# Patient Record
Sex: MALE | Race: OTHER | HISPANIC OR LATINO | Employment: FULL TIME | URBAN - NONMETROPOLITAN AREA
[De-identification: names, ages, dates, MRNs, and addresses within clinical notes are randomized per-mention and may not be internally consistent; named-entity substitution may affect disease eponyms.]

---

## 2019-06-09 ENCOUNTER — APPOINTMENT (EMERGENCY)
Dept: RADIOLOGY | Facility: HOSPITAL | Age: 49
End: 2019-06-09
Payer: COMMERCIAL

## 2019-06-09 ENCOUNTER — HOSPITAL ENCOUNTER (EMERGENCY)
Facility: HOSPITAL | Age: 49
End: 2019-06-09
Attending: EMERGENCY MEDICINE | Admitting: EMERGENCY MEDICINE
Payer: COMMERCIAL

## 2019-06-09 VITALS
HEART RATE: 61 BPM | DIASTOLIC BLOOD PRESSURE: 75 MMHG | WEIGHT: 236.55 LBS | HEIGHT: 73 IN | OXYGEN SATURATION: 95 % | BODY MASS INDEX: 31.35 KG/M2 | RESPIRATION RATE: 15 BRPM | SYSTOLIC BLOOD PRESSURE: 130 MMHG

## 2019-06-09 DIAGNOSIS — I21.4 NSTEMI (NON-ST ELEVATED MYOCARDIAL INFARCTION) (HCC): Primary | ICD-10-CM

## 2019-06-09 LAB
ALBUMIN SERPL BCP-MCNC: 3.8 G/DL (ref 3.5–5)
ALP SERPL-CCNC: 136 U/L (ref 46–116)
ALT SERPL W P-5'-P-CCNC: 48 U/L (ref 12–78)
ANION GAP SERPL CALCULATED.3IONS-SCNC: 13 MMOL/L (ref 4–13)
APTT PPP: 30 SECONDS (ref 26–38)
AST SERPL W P-5'-P-CCNC: 31 U/L (ref 5–45)
BASOPHILS # BLD AUTO: 0.04 THOUSANDS/ΜL (ref 0–0.1)
BASOPHILS NFR BLD AUTO: 0 % (ref 0–1)
BILIRUB SERPL-MCNC: 0.5 MG/DL (ref 0.2–1)
BUN SERPL-MCNC: 12 MG/DL (ref 5–25)
CALCIUM SERPL-MCNC: 8.6 MG/DL (ref 8.3–10.1)
CHLORIDE SERPL-SCNC: 106 MMOL/L (ref 100–108)
CO2 SERPL-SCNC: 21 MMOL/L (ref 21–32)
CREAT SERPL-MCNC: 1.12 MG/DL (ref 0.6–1.3)
EOSINOPHIL # BLD AUTO: 0.09 THOUSAND/ΜL (ref 0–0.61)
EOSINOPHIL NFR BLD AUTO: 1 % (ref 0–6)
ERYTHROCYTE [DISTWIDTH] IN BLOOD BY AUTOMATED COUNT: 13.6 % (ref 11.6–15.1)
GFR SERPL CREATININE-BSD FRML MDRD: 77 ML/MIN/1.73SQ M
GLUCOSE SERPL-MCNC: 148 MG/DL (ref 65–140)
HCT VFR BLD AUTO: 49.1 % (ref 36.5–49.3)
HGB BLD-MCNC: 16.3 G/DL (ref 12–17)
HOLD SPECIMEN: NORMAL
IMM GRANULOCYTES # BLD AUTO: 0.07 THOUSAND/UL (ref 0–0.2)
IMM GRANULOCYTES NFR BLD AUTO: 1 % (ref 0–2)
INR PPP: 1.03 (ref 0.86–1.17)
LYMPHOCYTES # BLD AUTO: 4.24 THOUSANDS/ΜL (ref 0.6–4.47)
LYMPHOCYTES NFR BLD AUTO: 33 % (ref 14–44)
MCH RBC QN AUTO: 31.8 PG (ref 26.8–34.3)
MCHC RBC AUTO-ENTMCNC: 33.2 G/DL (ref 31.4–37.4)
MCV RBC AUTO: 96 FL (ref 82–98)
MONOCYTES # BLD AUTO: 0.78 THOUSAND/ΜL (ref 0.17–1.22)
MONOCYTES NFR BLD AUTO: 6 % (ref 4–12)
NEUTROPHILS # BLD AUTO: 7.81 THOUSANDS/ΜL (ref 1.85–7.62)
NEUTS SEG NFR BLD AUTO: 59 % (ref 43–75)
NRBC BLD AUTO-RTO: 0 /100 WBCS
PLATELET # BLD AUTO: 241 THOUSANDS/UL (ref 149–390)
PMV BLD AUTO: 10.1 FL (ref 8.9–12.7)
POTASSIUM SERPL-SCNC: 4 MMOL/L (ref 3.5–5.3)
PROT SERPL-MCNC: 7.5 G/DL (ref 6.4–8.2)
PROTHROMBIN TIME: 13 SECONDS (ref 11.8–14.2)
RBC # BLD AUTO: 5.13 MILLION/UL (ref 3.88–5.62)
SODIUM SERPL-SCNC: 140 MMOL/L (ref 136–145)
TROPONIN I SERPL-MCNC: 0.14 NG/ML
WBC # BLD AUTO: 13.03 THOUSAND/UL (ref 4.31–10.16)

## 2019-06-09 PROCEDURE — 80053 COMPREHEN METABOLIC PANEL: CPT | Performed by: EMERGENCY MEDICINE

## 2019-06-09 PROCEDURE — 85025 COMPLETE CBC W/AUTO DIFF WBC: CPT | Performed by: EMERGENCY MEDICINE

## 2019-06-09 PROCEDURE — 85730 THROMBOPLASTIN TIME PARTIAL: CPT | Performed by: EMERGENCY MEDICINE

## 2019-06-09 PROCEDURE — 93005 ELECTROCARDIOGRAM TRACING: CPT

## 2019-06-09 PROCEDURE — 85610 PROTHROMBIN TIME: CPT | Performed by: EMERGENCY MEDICINE

## 2019-06-09 PROCEDURE — 96365 THER/PROPH/DIAG IV INF INIT: CPT

## 2019-06-09 PROCEDURE — 71045 X-RAY EXAM CHEST 1 VIEW: CPT

## 2019-06-09 PROCEDURE — 84484 ASSAY OF TROPONIN QUANT: CPT | Performed by: EMERGENCY MEDICINE

## 2019-06-09 PROCEDURE — 96366 THER/PROPH/DIAG IV INF ADDON: CPT

## 2019-06-09 PROCEDURE — 96375 TX/PRO/DX INJ NEW DRUG ADDON: CPT

## 2019-06-09 PROCEDURE — 99285 EMERGENCY DEPT VISIT HI MDM: CPT

## 2019-06-09 PROCEDURE — 99285 EMERGENCY DEPT VISIT HI MDM: CPT | Performed by: EMERGENCY MEDICINE

## 2019-06-09 PROCEDURE — 36415 COLL VENOUS BLD VENIPUNCTURE: CPT | Performed by: EMERGENCY MEDICINE

## 2019-06-09 RX ORDER — ASPIRIN 81 MG/1
243 TABLET, CHEWABLE ORAL ONCE
Status: COMPLETED | OUTPATIENT
Start: 2019-06-09 | End: 2019-06-09

## 2019-06-09 RX ORDER — HEPARIN SODIUM 1000 [USP'U]/ML
4000 INJECTION, SOLUTION INTRAVENOUS; SUBCUTANEOUS ONCE
Status: COMPLETED | OUTPATIENT
Start: 2019-06-09 | End: 2019-06-09

## 2019-06-09 RX ORDER — ASPIRIN 81 MG/1
81 TABLET, CHEWABLE ORAL ONCE
Status: COMPLETED | OUTPATIENT
Start: 2019-06-09 | End: 2019-06-09

## 2019-06-09 RX ORDER — HEPARIN SODIUM 1000 [USP'U]/ML
4000 INJECTION, SOLUTION INTRAVENOUS; SUBCUTANEOUS AS NEEDED
Status: DISCONTINUED | OUTPATIENT
Start: 2019-06-09 | End: 2019-06-10 | Stop reason: HOSPADM

## 2019-06-09 RX ORDER — HEPARIN SODIUM 10000 [USP'U]/100ML
3-20 INJECTION, SOLUTION INTRAVENOUS
Status: DISCONTINUED | OUTPATIENT
Start: 2019-06-09 | End: 2019-06-10 | Stop reason: HOSPADM

## 2019-06-09 RX ORDER — HEPARIN SODIUM 1000 [USP'U]/ML
2000 INJECTION, SOLUTION INTRAVENOUS; SUBCUTANEOUS AS NEEDED
Status: DISCONTINUED | OUTPATIENT
Start: 2019-06-09 | End: 2019-06-10 | Stop reason: HOSPADM

## 2019-06-09 RX ADMIN — HEPARIN SODIUM 4000 UNITS: 1000 INJECTION, SOLUTION INTRAVENOUS; SUBCUTANEOUS at 21:56

## 2019-06-09 RX ADMIN — ASPIRIN 81 MG 243 MG: 81 TABLET ORAL at 23:02

## 2019-06-09 RX ADMIN — ASPIRIN 81 MG 81 MG: 81 TABLET ORAL at 21:48

## 2019-06-09 RX ADMIN — HEPARIN SODIUM AND DEXTROSE 11.1 UNITS/KG/HR: 10000; 5 INJECTION INTRAVENOUS at 21:57

## 2019-06-10 ENCOUNTER — APPOINTMENT (INPATIENT)
Dept: NON INVASIVE DIAGNOSTICS | Facility: HOSPITAL | Age: 49
DRG: 247 | End: 2019-06-10
Payer: COMMERCIAL

## 2019-06-10 ENCOUNTER — HOSPITAL ENCOUNTER (INPATIENT)
Facility: HOSPITAL | Age: 49
LOS: 1 days | Discharge: HOME | DRG: 247 | End: 2019-06-11
Attending: HOSPITALIST | Admitting: HOSPITALIST
Payer: COMMERCIAL

## 2019-06-10 DIAGNOSIS — Z72.0 TOBACCO ABUSE: ICD-10-CM

## 2019-06-10 DIAGNOSIS — E78.00 HYPERCHOLESTEROLEMIA: ICD-10-CM

## 2019-06-10 DIAGNOSIS — I21.4 NSTEMI (NON-ST ELEVATED MYOCARDIAL INFARCTION) (HCC): Primary | ICD-10-CM

## 2019-06-10 LAB
ANION GAP SERPL CALCULATED.3IONS-SCNC: 16 MMOL/L (ref 4–13)
APTT PPP: 48 SECONDS (ref 26–38)
APTT PPP: 53 SECONDS (ref 26–38)
ATRIAL RATE: 48 BPM
ATRIAL RATE: 51 BPM
ATRIAL RATE: 63 BPM
BUN SERPL-MCNC: 12 MG/DL (ref 5–25)
CALCIUM SERPL-MCNC: 8.9 MG/DL (ref 8.3–10.1)
CHLORIDE SERPL-SCNC: 110 MMOL/L (ref 100–108)
CHOLEST SERPL-MCNC: 223 MG/DL (ref 50–200)
CO2 SERPL-SCNC: 18 MMOL/L (ref 21–32)
CREAT SERPL-MCNC: 0.98 MG/DL (ref 0.6–1.3)
ERYTHROCYTE [DISTWIDTH] IN BLOOD BY AUTOMATED COUNT: 13.8 % (ref 11.6–15.1)
EST. AVERAGE GLUCOSE BLD GHB EST-MCNC: 120 MG/DL
GFR SERPL CREATININE-BSD FRML MDRD: 90 ML/MIN/1.73SQ M
GLUCOSE SERPL-MCNC: 101 MG/DL (ref 65–140)
HBA1C MFR BLD: 5.8 % (ref 4.2–6.3)
HCT VFR BLD AUTO: 47.7 % (ref 36.5–49.3)
HDLC SERPL-MCNC: 34 MG/DL (ref 40–60)
HGB BLD-MCNC: 15.5 G/DL (ref 12–17)
KCT BLD-ACNC: 272 SEC (ref 89–137)
LDLC SERPL CALC-MCNC: 158 MG/DL (ref 0–100)
MCH RBC QN AUTO: 31.6 PG (ref 26.8–34.3)
MCHC RBC AUTO-ENTMCNC: 32.5 G/DL (ref 31.4–37.4)
MCV RBC AUTO: 97 FL (ref 82–98)
NONHDLC SERPL-MCNC: 189 MG/DL
P AXIS: 41 DEGREES
P AXIS: 49 DEGREES
P AXIS: 66 DEGREES
PLATELET # BLD AUTO: 214 THOUSANDS/UL (ref 149–390)
PMV BLD AUTO: 10.7 FL (ref 8.9–12.7)
POTASSIUM SERPL-SCNC: 4.1 MMOL/L (ref 3.5–5.3)
PR INTERVAL: 142 MS
PR INTERVAL: 146 MS
PR INTERVAL: 148 MS
QRS AXIS: 11 DEGREES
QRS AXIS: 21 DEGREES
QRS AXIS: 65 DEGREES
QRSD INTERVAL: 92 MS
QRSD INTERVAL: 94 MS
QRSD INTERVAL: 98 MS
QT INTERVAL: 442 MS
QT INTERVAL: 480 MS
QT INTERVAL: 490 MS
QTC INTERVAL: 428 MS
QTC INTERVAL: 451 MS
QTC INTERVAL: 452 MS
RBC # BLD AUTO: 4.9 MILLION/UL (ref 3.88–5.62)
SODIUM SERPL-SCNC: 144 MMOL/L (ref 136–145)
SPECIMEN SOURCE: ABNORMAL
T WAVE AXIS: 13 DEGREES
T WAVE AXIS: 16 DEGREES
T WAVE AXIS: 54 DEGREES
TRIGL SERPL-MCNC: 157 MG/DL
TROPONIN I SERPL-MCNC: 5.3 NG/ML
TROPONIN I SERPL-MCNC: 6.95 NG/ML
TROPONIN I SERPL-MCNC: 7.15 NG/ML
VENTRICULAR RATE: 48 BPM
VENTRICULAR RATE: 51 BPM
VENTRICULAR RATE: 63 BPM
WBC # BLD AUTO: 10.19 THOUSAND/UL (ref 4.31–10.16)

## 2019-06-10 PROCEDURE — 85730 THROMBOPLASTIN TIME PARTIAL: CPT | Performed by: HOSPITALIST

## 2019-06-10 PROCEDURE — 80061 LIPID PANEL: CPT | Performed by: PHYSICIAN ASSISTANT

## 2019-06-10 PROCEDURE — C1887 CATHETER, GUIDING: HCPCS | Performed by: PHYSICIAN ASSISTANT

## 2019-06-10 PROCEDURE — C1874 STENT, COATED/COV W/DEL SYS: HCPCS

## 2019-06-10 PROCEDURE — 99153 MOD SED SAME PHYS/QHP EA: CPT | Performed by: PHYSICIAN ASSISTANT

## 2019-06-10 PROCEDURE — B2111ZZ FLUOROSCOPY OF MULTIPLE CORONARY ARTERIES USING LOW OSMOLAR CONTRAST: ICD-10-PCS | Performed by: INTERNAL MEDICINE

## 2019-06-10 PROCEDURE — C1769 GUIDE WIRE: HCPCS | Performed by: PHYSICIAN ASSISTANT

## 2019-06-10 PROCEDURE — 4A023N7 MEASUREMENT OF CARDIAC SAMPLING AND PRESSURE, LEFT HEART, PERCUTANEOUS APPROACH: ICD-10-PCS | Performed by: INTERNAL MEDICINE

## 2019-06-10 PROCEDURE — 85730 THROMBOPLASTIN TIME PARTIAL: CPT | Performed by: PHYSICIAN ASSISTANT

## 2019-06-10 PROCEDURE — 027034Z DILATION OF CORONARY ARTERY, ONE ARTERY WITH DRUG-ELUTING INTRALUMINAL DEVICE, PERCUTANEOUS APPROACH: ICD-10-PCS | Performed by: INTERNAL MEDICINE

## 2019-06-10 PROCEDURE — C1725 CATH, TRANSLUMIN NON-LASER: HCPCS | Performed by: PHYSICIAN ASSISTANT

## 2019-06-10 PROCEDURE — 93010 ELECTROCARDIOGRAM REPORT: CPT | Performed by: INTERNAL MEDICINE

## 2019-06-10 PROCEDURE — 93005 ELECTROCARDIOGRAM TRACING: CPT

## 2019-06-10 PROCEDURE — 93458 L HRT ARTERY/VENTRICLE ANGIO: CPT | Performed by: INTERNAL MEDICINE

## 2019-06-10 PROCEDURE — 99223 1ST HOSP IP/OBS HIGH 75: CPT | Performed by: INTERNAL MEDICINE

## 2019-06-10 PROCEDURE — 84484 ASSAY OF TROPONIN QUANT: CPT | Performed by: PHYSICIAN ASSISTANT

## 2019-06-10 PROCEDURE — 99222 1ST HOSP IP/OBS MODERATE 55: CPT | Performed by: INTERNAL MEDICINE

## 2019-06-10 PROCEDURE — 99152 MOD SED SAME PHYS/QHP 5/>YRS: CPT | Performed by: INTERNAL MEDICINE

## 2019-06-10 PROCEDURE — 85347 COAGULATION TIME ACTIVATED: CPT

## 2019-06-10 PROCEDURE — 85027 COMPLETE CBC AUTOMATED: CPT | Performed by: PHYSICIAN ASSISTANT

## 2019-06-10 PROCEDURE — 83036 HEMOGLOBIN GLYCOSYLATED A1C: CPT | Performed by: PHYSICIAN ASSISTANT

## 2019-06-10 PROCEDURE — 80048 BASIC METABOLIC PNL TOTAL CA: CPT | Performed by: PHYSICIAN ASSISTANT

## 2019-06-10 PROCEDURE — 92928 PRQ TCAT PLMT NTRAC ST 1 LES: CPT | Performed by: INTERNAL MEDICINE

## 2019-06-10 PROCEDURE — 99152 MOD SED SAME PHYS/QHP 5/>YRS: CPT | Performed by: PHYSICIAN ASSISTANT

## 2019-06-10 PROCEDURE — C9600 PERC DRUG-EL COR STENT SING: HCPCS | Performed by: PHYSICIAN ASSISTANT

## 2019-06-10 PROCEDURE — 93458 L HRT ARTERY/VENTRICLE ANGIO: CPT | Performed by: PHYSICIAN ASSISTANT

## 2019-06-10 PROCEDURE — C1894 INTRO/SHEATH, NON-LASER: HCPCS | Performed by: PHYSICIAN ASSISTANT

## 2019-06-10 RX ORDER — METOPROLOL SUCCINATE 25 MG/1
25 TABLET, EXTENDED RELEASE ORAL DAILY
Status: DISCONTINUED | OUTPATIENT
Start: 2019-06-10 | End: 2019-06-11 | Stop reason: HOSPADM

## 2019-06-10 RX ORDER — ASPIRIN 81 MG/1
81 TABLET, CHEWABLE ORAL DAILY
Status: DISCONTINUED | OUTPATIENT
Start: 2019-06-11 | End: 2019-06-11 | Stop reason: HOSPADM

## 2019-06-10 RX ORDER — NITROGLYCERIN 20 MG/100ML
INJECTION INTRAVENOUS CODE/TRAUMA/SEDATION MEDICATION
Status: COMPLETED | OUTPATIENT
Start: 2019-06-10 | End: 2019-06-10

## 2019-06-10 RX ORDER — NITROGLYCERIN 0.4 MG/1
0.4 TABLET SUBLINGUAL
Status: DISCONTINUED | OUTPATIENT
Start: 2019-06-10 | End: 2019-06-11 | Stop reason: HOSPADM

## 2019-06-10 RX ORDER — CALCIUM CARBONATE 200(500)MG
1000 TABLET,CHEWABLE ORAL DAILY PRN
Status: DISCONTINUED | OUTPATIENT
Start: 2019-06-10 | End: 2019-06-11 | Stop reason: HOSPADM

## 2019-06-10 RX ORDER — CLOPIDOGREL BISULFATE 75 MG/1
600 TABLET ORAL ONCE
Status: COMPLETED | OUTPATIENT
Start: 2019-06-10 | End: 2019-06-10

## 2019-06-10 RX ORDER — HEPARIN SODIUM 1000 [USP'U]/ML
INJECTION, SOLUTION INTRAVENOUS; SUBCUTANEOUS CODE/TRAUMA/SEDATION MEDICATION
Status: COMPLETED | OUTPATIENT
Start: 2019-06-10 | End: 2019-06-10

## 2019-06-10 RX ORDER — VERAPAMIL HYDROCHLORIDE 2.5 MG/ML
INJECTION, SOLUTION INTRAVENOUS CODE/TRAUMA/SEDATION MEDICATION
Status: COMPLETED | OUTPATIENT
Start: 2019-06-10 | End: 2019-06-10

## 2019-06-10 RX ORDER — SODIUM CHLORIDE 9 MG/ML
100 INJECTION, SOLUTION INTRAVENOUS CONTINUOUS
Status: DISPENSED | OUTPATIENT
Start: 2019-06-10 | End: 2019-06-10

## 2019-06-10 RX ORDER — ONDANSETRON 2 MG/ML
4 INJECTION INTRAMUSCULAR; INTRAVENOUS EVERY 6 HOURS PRN
Status: DISCONTINUED | OUTPATIENT
Start: 2019-06-10 | End: 2019-06-11 | Stop reason: HOSPADM

## 2019-06-10 RX ORDER — CLOPIDOGREL BISULFATE 75 MG/1
75 TABLET ORAL DAILY
Status: DISCONTINUED | OUTPATIENT
Start: 2019-06-11 | End: 2019-06-11 | Stop reason: HOSPADM

## 2019-06-10 RX ORDER — HEPARIN SODIUM 10000 [USP'U]/100ML
3-20 INJECTION, SOLUTION INTRAVENOUS
Status: DISCONTINUED | OUTPATIENT
Start: 2019-06-10 | End: 2019-06-10

## 2019-06-10 RX ORDER — SODIUM CHLORIDE 9 MG/ML
125 INJECTION, SOLUTION INTRAVENOUS CONTINUOUS
Status: DISCONTINUED | OUTPATIENT
Start: 2019-06-10 | End: 2019-06-10

## 2019-06-10 RX ORDER — ATORVASTATIN CALCIUM 80 MG/1
80 TABLET, FILM COATED ORAL
Status: DISCONTINUED | OUTPATIENT
Start: 2019-06-10 | End: 2019-06-11 | Stop reason: HOSPADM

## 2019-06-10 RX ORDER — ACETAMINOPHEN 325 MG/1
650 TABLET ORAL EVERY 6 HOURS PRN
Status: DISCONTINUED | OUTPATIENT
Start: 2019-06-10 | End: 2019-06-11 | Stop reason: HOSPADM

## 2019-06-10 RX ORDER — NICOTINE 21 MG/24HR
1 PATCH, TRANSDERMAL 24 HOURS TRANSDERMAL
Status: DISCONTINUED | OUTPATIENT
Start: 2019-06-10 | End: 2019-06-11 | Stop reason: HOSPADM

## 2019-06-10 RX ORDER — MIDAZOLAM HYDROCHLORIDE 1 MG/ML
INJECTION INTRAMUSCULAR; INTRAVENOUS CODE/TRAUMA/SEDATION MEDICATION
Status: COMPLETED | OUTPATIENT
Start: 2019-06-10 | End: 2019-06-10

## 2019-06-10 RX ORDER — FENTANYL CITRATE 50 UG/ML
INJECTION, SOLUTION INTRAMUSCULAR; INTRAVENOUS CODE/TRAUMA/SEDATION MEDICATION
Status: COMPLETED | OUTPATIENT
Start: 2019-06-10 | End: 2019-06-10

## 2019-06-10 RX ORDER — LIDOCAINE HYDROCHLORIDE 10 MG/ML
INJECTION, SOLUTION INFILTRATION; PERINEURAL CODE/TRAUMA/SEDATION MEDICATION
Status: COMPLETED | OUTPATIENT
Start: 2019-06-10 | End: 2019-06-10

## 2019-06-10 RX ORDER — ASPIRIN 81 MG/1
TABLET, CHEWABLE ORAL CODE/TRAUMA/SEDATION MEDICATION
Status: COMPLETED | OUTPATIENT
Start: 2019-06-10 | End: 2019-06-10

## 2019-06-10 RX ADMIN — NITROGLYCERIN 200 MCG: 20 INJECTION INTRAVENOUS at 10:21

## 2019-06-10 RX ADMIN — HEPARIN SODIUM 4000 UNITS: 1000 INJECTION INTRAVENOUS; SUBCUTANEOUS at 10:21

## 2019-06-10 RX ADMIN — CLOPIDOGREL BISULFATE 600 MG: 75 TABLET ORAL at 09:36

## 2019-06-10 RX ADMIN — FENTANYL CITRATE 50 MCG: 50 INJECTION, SOLUTION INTRAMUSCULAR; INTRAVENOUS at 10:20

## 2019-06-10 RX ADMIN — SODIUM CHLORIDE 100 ML/HR: 0.9 INJECTION, SOLUTION INTRAVENOUS at 10:56

## 2019-06-10 RX ADMIN — ASPIRIN 81 MG 324 MG: 81 TABLET ORAL at 10:09

## 2019-06-10 RX ADMIN — SODIUM CHLORIDE 125 ML/HR: 0.9 INJECTION, SOLUTION INTRAVENOUS at 03:00

## 2019-06-10 RX ADMIN — NITROGLYCERIN 0.4 MG: 0.4 TABLET SUBLINGUAL at 08:39

## 2019-06-10 RX ADMIN — HEPARIN SODIUM AND DEXTROSE 13.1 UNITS/KG/HR: 10000; 5 INJECTION INTRAVENOUS at 03:15

## 2019-06-10 RX ADMIN — IOHEXOL 75 ML: 350 INJECTION, SOLUTION INTRAVENOUS at 10:33

## 2019-06-10 RX ADMIN — VERAPAMIL HYDROCHLORIDE 2.5 MG: 2.5 INJECTION INTRAVENOUS at 10:21

## 2019-06-10 RX ADMIN — NICOTINE 1 PATCH: 21 PATCH TRANSDERMAL at 21:09

## 2019-06-10 RX ADMIN — ATORVASTATIN CALCIUM 80 MG: 80 TABLET, FILM COATED ORAL at 16:50

## 2019-06-10 RX ADMIN — HEPARIN SODIUM 6000 UNITS: 1000 INJECTION INTRAVENOUS; SUBCUTANEOUS at 10:26

## 2019-06-10 RX ADMIN — MIDAZOLAM 2 MG: 1 INJECTION INTRAMUSCULAR; INTRAVENOUS at 10:20

## 2019-06-10 RX ADMIN — SODIUM CHLORIDE 100 ML/HR: 0.9 INJECTION, SOLUTION INTRAVENOUS at 18:01

## 2019-06-10 RX ADMIN — LIDOCAINE HYDROCHLORIDE 1 ML: 10 INJECTION, SOLUTION INFILTRATION; PERINEURAL at 10:20

## 2019-06-10 RX ADMIN — HEPARIN SODIUM AND DEXTROSE 15.1 UNITS/KG/HR: 10000; 5 INJECTION INTRAVENOUS at 09:31

## 2019-06-11 ENCOUNTER — APPOINTMENT (INPATIENT)
Dept: NON INVASIVE DIAGNOSTICS | Facility: HOSPITAL | Age: 49
DRG: 247 | End: 2019-06-11
Payer: COMMERCIAL

## 2019-06-11 VITALS
SYSTOLIC BLOOD PRESSURE: 127 MMHG | HEIGHT: 73 IN | RESPIRATION RATE: 18 BRPM | BODY MASS INDEX: 29.57 KG/M2 | TEMPERATURE: 97.2 F | HEART RATE: 61 BPM | DIASTOLIC BLOOD PRESSURE: 76 MMHG | OXYGEN SATURATION: 99 % | WEIGHT: 223.11 LBS

## 2019-06-11 PROBLEM — E78.00 HYPERCHOLESTEROLEMIA: Status: ACTIVE | Noted: 2019-06-11

## 2019-06-11 LAB
ATRIAL RATE: 54 BPM
P AXIS: 48 DEGREES
PR INTERVAL: 146 MS
QRS AXIS: 17 DEGREES
QRSD INTERVAL: 90 MS
QT INTERVAL: 472 MS
QTC INTERVAL: 447 MS
T WAVE AXIS: 24 DEGREES
VENTRICULAR RATE: 54 BPM

## 2019-06-11 PROCEDURE — 93005 ELECTROCARDIOGRAM TRACING: CPT

## 2019-06-11 PROCEDURE — 93306 TTE W/DOPPLER COMPLETE: CPT | Performed by: INTERNAL MEDICINE

## 2019-06-11 PROCEDURE — 99232 SBSQ HOSP IP/OBS MODERATE 35: CPT | Performed by: INTERNAL MEDICINE

## 2019-06-11 PROCEDURE — 93010 ELECTROCARDIOGRAM REPORT: CPT | Performed by: INTERNAL MEDICINE

## 2019-06-11 PROCEDURE — 93306 TTE W/DOPPLER COMPLETE: CPT

## 2019-06-11 PROCEDURE — 99239 HOSP IP/OBS DSCHRG MGMT >30: CPT | Performed by: INTERNAL MEDICINE

## 2019-06-11 RX ORDER — MAGNESIUM HYDROXIDE/ALUMINUM HYDROXICE/SIMETHICONE 120; 1200; 1200 MG/30ML; MG/30ML; MG/30ML
30 SUSPENSION ORAL ONCE
Status: COMPLETED | OUTPATIENT
Start: 2019-06-11 | End: 2019-06-11

## 2019-06-11 RX ORDER — NITROGLYCERIN 0.4 MG/1
0.4 TABLET SUBLINGUAL
Qty: 25 TABLET | Refills: 1 | Status: SHIPPED | OUTPATIENT
Start: 2019-06-11 | End: 2019-08-16 | Stop reason: SDUPTHER

## 2019-06-11 RX ORDER — ASPIRIN 81 MG/1
81 TABLET, CHEWABLE ORAL DAILY
Refills: 0
Start: 2019-06-12 | End: 2020-04-10 | Stop reason: SDUPTHER

## 2019-06-11 RX ORDER — FUROSEMIDE 20 MG/1
20 TABLET ORAL ONCE
Status: COMPLETED | OUTPATIENT
Start: 2019-06-11 | End: 2019-06-11

## 2019-06-11 RX ORDER — NICOTINE 21 MG/24HR
1 PATCH, TRANSDERMAL 24 HOURS TRANSDERMAL
Qty: 28 PATCH | Refills: 0 | Status: SHIPPED | OUTPATIENT
Start: 2019-06-11 | End: 2019-12-25 | Stop reason: SDDI

## 2019-06-11 RX ORDER — ATORVASTATIN CALCIUM 80 MG/1
80 TABLET, FILM COATED ORAL
Qty: 30 TABLET | Refills: 1 | Status: SHIPPED | OUTPATIENT
Start: 2019-06-11 | End: 2019-08-16 | Stop reason: SDUPTHER

## 2019-06-11 RX ORDER — METOPROLOL SUCCINATE 25 MG/1
25 TABLET, EXTENDED RELEASE ORAL DAILY
Qty: 30 TABLET | Refills: 1 | Status: SHIPPED | OUTPATIENT
Start: 2019-06-12 | End: 2019-08-16 | Stop reason: SDUPTHER

## 2019-06-11 RX ORDER — CLOPIDOGREL BISULFATE 75 MG/1
75 TABLET ORAL DAILY
Qty: 30 TABLET | Refills: 1 | Status: SHIPPED | OUTPATIENT
Start: 2019-06-12 | End: 2019-08-16 | Stop reason: SDUPTHER

## 2019-06-11 RX ADMIN — METOPROLOL SUCCINATE 25 MG: 25 TABLET, EXTENDED RELEASE ORAL at 08:32

## 2019-06-11 RX ADMIN — CLOPIDOGREL BISULFATE 75 MG: 75 TABLET ORAL at 08:31

## 2019-06-11 RX ADMIN — ATORVASTATIN CALCIUM 80 MG: 80 TABLET, FILM COATED ORAL at 19:06

## 2019-06-11 RX ADMIN — ALUMINUM HYDROXIDE, MAGNESIUM HYDROXIDE, AND SIMETHICONE 30 ML: 200; 200; 20 SUSPENSION ORAL at 09:04

## 2019-06-11 RX ADMIN — NITROGLYCERIN 0.4 MG: 0.4 TABLET SUBLINGUAL at 07:34

## 2019-06-11 RX ADMIN — FUROSEMIDE 20 MG: 20 TABLET ORAL at 09:55

## 2019-06-11 RX ADMIN — ASPIRIN 81 MG 81 MG: 81 TABLET ORAL at 08:31

## 2019-06-17 ENCOUNTER — OFFICE VISIT (OUTPATIENT)
Dept: FAMILY MEDICINE CLINIC | Facility: CLINIC | Age: 49
End: 2019-06-17
Payer: COMMERCIAL

## 2019-06-17 VITALS
DIASTOLIC BLOOD PRESSURE: 76 MMHG | HEIGHT: 73 IN | SYSTOLIC BLOOD PRESSURE: 112 MMHG | RESPIRATION RATE: 18 BRPM | OXYGEN SATURATION: 99 % | WEIGHT: 224.8 LBS | TEMPERATURE: 96.8 F | BODY MASS INDEX: 29.79 KG/M2 | HEART RATE: 56 BPM

## 2019-06-17 DIAGNOSIS — E66.3 OVERWEIGHT: ICD-10-CM

## 2019-06-17 DIAGNOSIS — R73.03 PREDIABETES: ICD-10-CM

## 2019-06-17 DIAGNOSIS — K21.9 GASTROESOPHAGEAL REFLUX DISEASE WITHOUT ESOPHAGITIS: ICD-10-CM

## 2019-06-17 DIAGNOSIS — I21.4 NSTEMI (NON-ST ELEVATED MYOCARDIAL INFARCTION) (HCC): ICD-10-CM

## 2019-06-17 DIAGNOSIS — Z72.0 TOBACCO ABUSE: ICD-10-CM

## 2019-06-17 DIAGNOSIS — Z76.89 ENCOUNTER TO ESTABLISH CARE: Primary | ICD-10-CM

## 2019-06-17 PROCEDURE — 99204 OFFICE O/P NEW MOD 45 MIN: CPT | Performed by: FAMILY MEDICINE

## 2019-06-17 RX ORDER — FAMOTIDINE 20 MG/1
20 TABLET, FILM COATED ORAL 2 TIMES DAILY
Qty: 60 TABLET | Refills: 1 | Status: SHIPPED | OUTPATIENT
Start: 2019-06-17 | End: 2019-08-09 | Stop reason: SDUPTHER

## 2019-06-25 ENCOUNTER — OFFICE VISIT (OUTPATIENT)
Dept: CARDIOLOGY CLINIC | Facility: CLINIC | Age: 49
End: 2019-06-25
Payer: COMMERCIAL

## 2019-06-25 VITALS
DIASTOLIC BLOOD PRESSURE: 60 MMHG | HEART RATE: 62 BPM | BODY MASS INDEX: 30.22 KG/M2 | SYSTOLIC BLOOD PRESSURE: 100 MMHG | WEIGHT: 228 LBS | HEIGHT: 73 IN | OXYGEN SATURATION: 98 %

## 2019-06-25 DIAGNOSIS — E78.00 HYPERCHOLESTEROLEMIA: ICD-10-CM

## 2019-06-25 DIAGNOSIS — Z72.0 TOBACCO ABUSE: ICD-10-CM

## 2019-06-25 DIAGNOSIS — I21.4 NSTEMI (NON-ST ELEVATED MYOCARDIAL INFARCTION) (HCC): Primary | ICD-10-CM

## 2019-06-25 PROCEDURE — 99214 OFFICE O/P EST MOD 30 MIN: CPT | Performed by: PHYSICIAN ASSISTANT

## 2019-07-29 ENCOUNTER — OFFICE VISIT (OUTPATIENT)
Dept: FAMILY MEDICINE CLINIC | Facility: CLINIC | Age: 49
End: 2019-07-29
Payer: COMMERCIAL

## 2019-07-29 VITALS
HEIGHT: 73 IN | TEMPERATURE: 97.6 F | DIASTOLIC BLOOD PRESSURE: 66 MMHG | RESPIRATION RATE: 16 BRPM | SYSTOLIC BLOOD PRESSURE: 110 MMHG | WEIGHT: 228 LBS | HEART RATE: 67 BPM | BODY MASS INDEX: 30.22 KG/M2

## 2019-07-29 DIAGNOSIS — R73.03 PREDIABETES: ICD-10-CM

## 2019-07-29 DIAGNOSIS — E78.00 HYPERCHOLESTEROLEMIA: ICD-10-CM

## 2019-07-29 DIAGNOSIS — E66.3 OVERWEIGHT: ICD-10-CM

## 2019-07-29 DIAGNOSIS — Z72.0 TOBACCO ABUSE: ICD-10-CM

## 2019-07-29 DIAGNOSIS — R07.9 CHEST PAIN, UNSPECIFIED TYPE: Primary | ICD-10-CM

## 2019-07-29 DIAGNOSIS — I21.4 NSTEMI (NON-ST ELEVATED MYOCARDIAL INFARCTION) (HCC): ICD-10-CM

## 2019-07-29 PROCEDURE — 99213 OFFICE O/P EST LOW 20 MIN: CPT | Performed by: FAMILY MEDICINE

## 2019-07-29 NOTE — PROGRESS NOTES
Assessment/Plan:     Diagnoses and all orders for this visit:    Chest pain, unspecified type  Comments:  having intermittent pinching right sided chest pain that is improved with nitroglycerin  Follow up with cardiology 7/30/19 as scheduled  Seek ED if worsens  NSTEMI (non-ST elevated myocardial infarction) ([facility] [address] )  Comments:  continue current medication  cardiology follow up 7/30/19 as scheduled  Overweight  Comments:  up 4 lbs from last visit  discussed healthy diet, weight loss  Tobacco abuse  Comments:  using cigarrettes occassionally  advised complete cessation  Prediabetes  -     UA w Reflex to Microscopic w Reflex to Culture -Lab Collect  -     Microalbumin / creatinine urine ratio; Future  -     HEMOGLOBIN A1C W/ EAG ESTIMATION; Future    Hypercholesterolemia  -     CBC and differential; Future  -     Comprehensive metabolic panel; Future  -     TSH, 3rd generation with Free T4 reflex; Future  -     Lipid Panel with Direct LDL reflex; Future          Chest pain/NSTEMI - patient does report that he is having "pinching" right sided chest pains that occur randomly  He uses nitro for chest pain with relief  He and wife are nonspecific about how many times this has occurred, but report they are using nitroglycerin every 1-2 days  No symptoms currently  Cardiology follow up tomorrow, 7/30/19, as scheduled  Advised to seek ED if sx persist or acutely worsen  Routine labs ordered for patient to have completed before next visit  Discussed diet and exercise changes, along with complete smoking cessation  Return in about 6 weeks (around 9/9/2019) for Next scheduled follow up  Subjective:        Patient ID: Bety Warren is a 51 y o  male  Chief Complaint   Patient presents with    Follow-up       Patient is a 41-year-old male who presents to the office today for one-month follow-up  He states he has been doing well, no concerns today    He works outside as labor, states he has had intermittent right-sided chest pains since last visit  He states the chest pain occurs randomly, has used nitro with improvement  Patient and wife say that he needs to use the nitro every day or every few days  There was 1 occasion where he had to take a 2nd nitro after 5 minutes, has not needed more than that  He reports he does experience fatigue sometimes, but contributes this to his work schedule  He denies headaches, vision changes, dizziness, syncope, shortness of breath, shortness of breath with exertion, heart palpitations, leg edema, heartburn, reflux, belching, abdominal pain, nausea, vomiting, diarrhea, weakness  Patient and wife state he has made some diet changes, weight is up 4 lb today  He reports he is occasionally smoking cigarettes, no more than 1/day          The following portions of the patient's history were reviewed and updated as appropriate: allergies, current medications, past family history, past medical history, past social history, past surgical history and problem list     Patient Active Problem List   Diagnosis    [facility]    Tobacco abuse    Hypercholesterolemia    Blurry vision, bilateral    Fatigue    Impaired fasting glucose    Overweight    Prediabetes       Current Outpatient Medications   Medication Sig Dispense Refill    aspirin 81 mg chewable tablet Chew 1 tablet (81 mg total) daily  0    atorvastatin (LIPITOR) 80 mg tablet Take 1 tablet (80 mg total) by mouth daily with dinner 30 tablet 1    clopidogrel (PLAVIX) 75 mg tablet Take 1 tablet (75 mg total) by mouth daily 30 tablet 1    famotidine (PEPCID) 20 mg tablet Take 1 tablet (20 mg total) by mouth 2 (two) times a day 60 tablet 1    metoprolol succinate (TOPROL-XL) 25 mg 24 hr tablet Take 1 tablet (25 mg total) by mouth daily 30 tablet 1    nicotine (NICODERM CQ) 21 mg/24 hr TD 24 hr patch Place 1 patch on the skin daily at bedtime 28 patch 0    nitroglycerin (NITROSTAT) 0 4 mg SL tablet Place 1 tablet (0 4 mg total) under the tongue every 5 (five) minutes as needed for chest pain 25 tablet 1     No current facility-administered medications for this visit  History reviewed  No pertinent past medical history  Past Surgical History:   Procedure Laterality Date    HAND SURGERY          Social History     Socioeconomic History    Marital status: /Civil Union     Spouse name: Not on file    Number of children: Not on file    Years of education: Not on file    Highest education level: Not on file   Occupational History    Not on file   Social Needs    Financial resource strain: Not on file    Food insecurity:     Worry: Not on file     Inability: Not on file    Transportation needs:     Medical: Not on file     Non-medical: Not on file   Tobacco Use    Smoking status: Current Every Day Smoker     Packs/day: 1 00     Types: Cigarettes    Smokeless tobacco: Never Used   Substance and Sexual Activity    Alcohol use: Yes     Comment: occasional    Drug use: Not Currently    Sexual activity: Not on file   Lifestyle    Physical activity:     Days per week: Not on file     Minutes per session: Not on file    Stress: Not on file   Relationships    Social connections:     Talks on phone: Not on file     Gets together: Not on file     Attends Worship service: Not on file     Active member of club or organization: Not on file     Attends meetings of clubs or organizations: Not on file     Relationship status: Not on file    Intimate partner violence:     Fear of current or ex partner: Not on file     Emotionally abused: Not on file     Physically abused: Not on file     Forced sexual activity: Not on file   Other Topics Concern    Not on file   Social History Narrative    Not on file        Review of Systems   Constitutional: Negative  HENT: Negative for sore throat, trouble swallowing and voice change      Eyes: Negative for photophobia and visual disturbance  Respiratory: Negative for apnea, cough, choking, chest tightness, shortness of breath, wheezing and stridor  Cardiovascular: Positive for chest pain  Negative for palpitations and leg swelling  Gastrointestinal: Negative  Genitourinary: Negative  Neurological: Negative for dizziness, syncope, weakness, light-headedness and headaches  Objective:      /66 (BP Location: Left arm, Patient Position: Sitting, Cuff Size: Large)   Pulse 67   Temp 97 6 °F (36 4 °C) (Tympanic)   Resp 16   Ht 6' 1" (1 854 m)   Wt 103 kg (228 lb)   BMI 30 08 kg/m²          Physical Exam   Constitutional: He is oriented to person, place, and time  He appears well-developed and well-nourished  obese   HENT:   Head: Normocephalic and atraumatic  Mouth/Throat: Oropharynx is clear and moist    Eyes: Pupils are equal, round, and reactive to light  Conjunctivae are normal    Neck: Neck supple  Cardiovascular: Normal rate, regular rhythm and normal heart sounds  Pulmonary/Chest: Effort normal and breath sounds normal    Abdominal: Soft  Musculoskeletal: He exhibits no edema  Neurological: He is alert and oriented to person, place, and time  Skin: Skin is warm and dry  Capillary refill takes less than 2 seconds  Psychiatric: He has a normal mood and affect

## 2019-08-09 DIAGNOSIS — K21.9 GASTROESOPHAGEAL REFLUX DISEASE WITHOUT ESOPHAGITIS: ICD-10-CM

## 2019-08-09 RX ORDER — FAMOTIDINE 20 MG/1
TABLET, FILM COATED ORAL
Qty: 60 TABLET | Refills: 1 | Status: SHIPPED | OUTPATIENT
Start: 2019-08-09 | End: 2019-08-16 | Stop reason: SDUPTHER

## 2019-08-16 DIAGNOSIS — I21.4 NSTEMI (NON-ST ELEVATED MYOCARDIAL INFARCTION) (HCC): ICD-10-CM

## 2019-08-16 DIAGNOSIS — K21.9 GASTROESOPHAGEAL REFLUX DISEASE WITHOUT ESOPHAGITIS: ICD-10-CM

## 2019-08-16 DIAGNOSIS — E78.00 HYPERCHOLESTEROLEMIA: ICD-10-CM

## 2019-08-16 RX ORDER — NITROGLYCERIN 0.4 MG/1
0.4 TABLET SUBLINGUAL
Qty: 25 TABLET | Refills: 1 | Status: SHIPPED | OUTPATIENT
Start: 2019-08-16 | End: 2020-03-18 | Stop reason: SDUPTHER

## 2019-08-16 RX ORDER — ATORVASTATIN CALCIUM 80 MG/1
80 TABLET, FILM COATED ORAL
Qty: 90 TABLET | Refills: 0 | Status: SHIPPED | OUTPATIENT
Start: 2019-08-16 | End: 2019-11-07 | Stop reason: SDUPTHER

## 2019-08-16 RX ORDER — CLOPIDOGREL BISULFATE 75 MG/1
75 TABLET ORAL DAILY
Qty: 90 TABLET | Refills: 0 | Status: SHIPPED | OUTPATIENT
Start: 2019-08-16 | End: 2019-11-07 | Stop reason: SDUPTHER

## 2019-08-16 RX ORDER — FAMOTIDINE 20 MG/1
20 TABLET, FILM COATED ORAL 2 TIMES DAILY
Qty: 180 TABLET | Refills: 0 | Status: SHIPPED | OUTPATIENT
Start: 2019-08-16 | End: 2019-10-17

## 2019-08-16 RX ORDER — METOPROLOL SUCCINATE 25 MG/1
25 TABLET, EXTENDED RELEASE ORAL DAILY
Qty: 90 TABLET | Refills: 0 | Status: SHIPPED | OUTPATIENT
Start: 2019-08-16 | End: 2019-11-10 | Stop reason: SDUPTHER

## 2019-09-10 ENCOUNTER — APPOINTMENT (OUTPATIENT)
Dept: LAB | Facility: CLINIC | Age: 49
End: 2019-09-10
Payer: COMMERCIAL

## 2019-09-10 DIAGNOSIS — R73.03 PREDIABETES: ICD-10-CM

## 2019-09-10 DIAGNOSIS — E78.00 HYPERCHOLESTEROLEMIA: ICD-10-CM

## 2019-09-10 LAB
ALBUMIN SERPL BCP-MCNC: 3.8 G/DL (ref 3.5–5)
ALP SERPL-CCNC: 135 U/L (ref 46–116)
ALT SERPL W P-5'-P-CCNC: 43 U/L (ref 12–78)
ANION GAP SERPL CALCULATED.3IONS-SCNC: 6 MMOL/L (ref 4–13)
AST SERPL W P-5'-P-CCNC: 30 U/L (ref 5–45)
BASOPHILS # BLD AUTO: 0.05 THOUSANDS/ΜL (ref 0–0.1)
BASOPHILS NFR BLD AUTO: 1 % (ref 0–1)
BILIRUB SERPL-MCNC: 0.58 MG/DL (ref 0.2–1)
BILIRUB UR QL STRIP: NEGATIVE
BUN SERPL-MCNC: 18 MG/DL (ref 5–25)
CALCIUM SERPL-MCNC: 8.9 MG/DL (ref 8.3–10.1)
CHLORIDE SERPL-SCNC: 111 MMOL/L (ref 100–108)
CHOLEST SERPL-MCNC: 142 MG/DL (ref 50–200)
CLARITY UR: CLEAR
CO2 SERPL-SCNC: 21 MMOL/L (ref 21–32)
COLOR UR: YELLOW
CREAT SERPL-MCNC: 1.13 MG/DL (ref 0.6–1.3)
CREAT UR-MCNC: 145 MG/DL
EOSINOPHIL # BLD AUTO: 0.16 THOUSAND/ΜL (ref 0–0.61)
EOSINOPHIL NFR BLD AUTO: 2 % (ref 0–6)
ERYTHROCYTE [DISTWIDTH] IN BLOOD BY AUTOMATED COUNT: 13.7 % (ref 11.6–15.1)
EST. AVERAGE GLUCOSE BLD GHB EST-MCNC: 108 MG/DL
GFR SERPL CREATININE-BSD FRML MDRD: 76 ML/MIN/1.73SQ M
GLUCOSE P FAST SERPL-MCNC: 106 MG/DL (ref 65–99)
GLUCOSE UR STRIP-MCNC: NEGATIVE MG/DL
HBA1C MFR BLD: 5.4 % (ref 4.2–6.3)
HCT VFR BLD AUTO: 47 % (ref 36.5–49.3)
HDLC SERPL-MCNC: 36 MG/DL (ref 40–60)
HGB BLD-MCNC: 15.2 G/DL (ref 12–17)
HGB UR QL STRIP.AUTO: NEGATIVE
IMM GRANULOCYTES # BLD AUTO: 0.02 THOUSAND/UL (ref 0–0.2)
IMM GRANULOCYTES NFR BLD AUTO: 0 % (ref 0–2)
KETONES UR STRIP-MCNC: NEGATIVE MG/DL
LDLC SERPL CALC-MCNC: 87 MG/DL (ref 0–100)
LEUKOCYTE ESTERASE UR QL STRIP: NEGATIVE
LYMPHOCYTES # BLD AUTO: 2.38 THOUSANDS/ΜL (ref 0.6–4.47)
LYMPHOCYTES NFR BLD AUTO: 31 % (ref 14–44)
MCH RBC QN AUTO: 31.3 PG (ref 26.8–34.3)
MCHC RBC AUTO-ENTMCNC: 32.3 G/DL (ref 31.4–37.4)
MCV RBC AUTO: 97 FL (ref 82–98)
MICROALBUMIN UR-MCNC: 5.7 MG/L (ref 0–20)
MICROALBUMIN/CREAT 24H UR: 4 MG/G CREATININE (ref 0–30)
MONOCYTES # BLD AUTO: 0.58 THOUSAND/ΜL (ref 0.17–1.22)
MONOCYTES NFR BLD AUTO: 8 % (ref 4–12)
NEUTROPHILS # BLD AUTO: 4.5 THOUSANDS/ΜL (ref 1.85–7.62)
NEUTS SEG NFR BLD AUTO: 58 % (ref 43–75)
NITRITE UR QL STRIP: NEGATIVE
NRBC BLD AUTO-RTO: 0 /100 WBCS
PH UR STRIP.AUTO: 6 [PH]
PLATELET # BLD AUTO: 206 THOUSANDS/UL (ref 149–390)
PMV BLD AUTO: 10.9 FL (ref 8.9–12.7)
POTASSIUM SERPL-SCNC: 4 MMOL/L (ref 3.5–5.3)
PROT SERPL-MCNC: 7.6 G/DL (ref 6.4–8.2)
PROT UR STRIP-MCNC: NEGATIVE MG/DL
RBC # BLD AUTO: 4.86 MILLION/UL (ref 3.88–5.62)
SODIUM SERPL-SCNC: 138 MMOL/L (ref 136–145)
SP GR UR STRIP.AUTO: 1.02 (ref 1–1.03)
TRIGL SERPL-MCNC: 93 MG/DL
TSH SERPL DL<=0.05 MIU/L-ACNC: 1.55 UIU/ML (ref 0.36–3.74)
UROBILINOGEN UR QL STRIP.AUTO: 1 E.U./DL
WBC # BLD AUTO: 7.69 THOUSAND/UL (ref 4.31–10.16)

## 2019-09-10 PROCEDURE — 36415 COLL VENOUS BLD VENIPUNCTURE: CPT

## 2019-09-10 PROCEDURE — 80053 COMPREHEN METABOLIC PANEL: CPT

## 2019-09-10 PROCEDURE — 83036 HEMOGLOBIN GLYCOSYLATED A1C: CPT

## 2019-09-10 PROCEDURE — 82570 ASSAY OF URINE CREATININE: CPT

## 2019-09-10 PROCEDURE — 85025 COMPLETE CBC W/AUTO DIFF WBC: CPT

## 2019-09-10 PROCEDURE — 81003 URINALYSIS AUTO W/O SCOPE: CPT | Performed by: FAMILY MEDICINE

## 2019-09-10 PROCEDURE — 84443 ASSAY THYROID STIM HORMONE: CPT

## 2019-09-10 PROCEDURE — 80061 LIPID PANEL: CPT

## 2019-09-10 PROCEDURE — 82043 UR ALBUMIN QUANTITATIVE: CPT

## 2019-10-17 ENCOUNTER — OFFICE VISIT (OUTPATIENT)
Dept: FAMILY MEDICINE CLINIC | Facility: CLINIC | Age: 49
End: 2019-10-17
Payer: COMMERCIAL

## 2019-10-17 VITALS
SYSTOLIC BLOOD PRESSURE: 110 MMHG | RESPIRATION RATE: 16 BRPM | BODY MASS INDEX: 30.09 KG/M2 | WEIGHT: 227 LBS | DIASTOLIC BLOOD PRESSURE: 66 MMHG | OXYGEN SATURATION: 97 % | TEMPERATURE: 97.5 F | HEART RATE: 77 BPM | HEIGHT: 73 IN

## 2019-10-17 DIAGNOSIS — Z23 NEED FOR INFLUENZA VACCINATION: ICD-10-CM

## 2019-10-17 DIAGNOSIS — E66.3 OVERWEIGHT: Primary | ICD-10-CM

## 2019-10-17 DIAGNOSIS — E78.00 HYPERCHOLESTEROLEMIA: ICD-10-CM

## 2019-10-17 DIAGNOSIS — R73.03 PREDIABETES: ICD-10-CM

## 2019-10-17 DIAGNOSIS — I21.4 NSTEMI (NON-ST ELEVATED MYOCARDIAL INFARCTION) (HCC): ICD-10-CM

## 2019-10-17 DIAGNOSIS — R53.82 CHRONIC FATIGUE: ICD-10-CM

## 2019-10-17 PROCEDURE — 90471 IMMUNIZATION ADMIN: CPT | Performed by: FAMILY MEDICINE

## 2019-10-17 PROCEDURE — 99213 OFFICE O/P EST LOW 20 MIN: CPT | Performed by: FAMILY MEDICINE

## 2019-10-17 PROCEDURE — 90686 IIV4 VACC NO PRSV 0.5 ML IM: CPT

## 2019-10-17 NOTE — PROGRESS NOTES
Assessment/Plan:     Diagnoses and all orders for this visit:    Overweight    Prediabetes    Hypercholesterolemia    NSTEMI (non-ST elevated myocardial infarction) ([facility] [address] )    Chronic fatigue    Need for influenza vaccination  -     Flu vaccine greater than or equal to 2yo preservative free IM          Overweight/Prediabetes/Hyperlipidemia - labs show improved cholesterol and A1C  Pt reports complete smoking cessation  Weight stable  Advised continuing diet changes, adequate hydration, good sleep, physical activity, weight loss  NSTEMI - he denies any episodes of chest pain, sob, dizziness  Continue current medications, smoking cessation, healthy diet, exercise  Continue cardiology follow up as directed    Chronic fatigue - advised improving sleep, diet  Labs stable  He denies sleep study today, will recheck sx at follow up and consider if no improvement  Flu vaccine today      Return in about 3 months (around 1/17/2020) for routine follow up, and follow up with specialist as directed  Subjective:        Patient ID: Mey Rudolph is a 51 y o  male  Chief Complaint   Patient presents with    Follow-up     Pt woulk like to review lab work   Fatigue       Patient is a 43-year-old male who presents to the office today for lab review  He has history of hypertension, hyperlipidemia, obesity, tobacco use, NSTEMI, prediabetes  He had labs done to review today  He has been making diet changes, reducing portions and carb intake  He is staying hydrated  He is sleeping well, reports fatigue after working all day  He denies fevers or chills, headache, dizziness, weight loss, adenopathy, bleeding or bruising, chest pain, shortness of breath, palpitations, shortness of breath with exertion, edema, abdominal pain, nausea, vomiting, diarrhea, constipation, blood in stool, melena, urinary symptoms   Wife reports snoring, no nocturnal awakenings, gasping, apneas, insomnia, AM headaches, dozing off, decreased concentration, napping  He is has schedule ophthalmology appointment  He has also quit smoking  His labs show improved cholesterol and A1c  A1c down to 5 4 from 5 8  Total cholesterol down to 142 from 238, LDL down to 87 from 158, HDL slightly low at 36 but has improved from 34, triglycerides down to 98 from 157  He has been compliant with medication, attempting smoking cessation, reducing saturated fats, sugar, carbs  He is physically active daily through work  All other labs normal       The following portions of the patient's history were reviewed and updated as appropriate: allergies, current medications, past family history, past medical history, past social history, past surgical history and problem list     Patient Active Problem List   Diagnosis    NSTEMI (non-ST elevated myocardial infarction) ([facility] [address] [facility]    Tobacco abuse    Hypercholesterolemia    Blurry vision, bilateral    Fatigue    Impaired fasting glucose    Overweight    Prediabetes       Current Outpatient Medications   Medication Sig Dispense Refill    aspirin 81 mg chewable tablet Chew 1 tablet (81 mg total) daily  0    atorvastatin (LIPITOR) 80 mg tablet Take 1 tablet (80 mg total) by mouth daily with dinner 90 tablet 0    clopidogrel (PLAVIX) 75 mg tablet Take 1 tablet (75 mg total) by mouth daily 90 tablet 0    metoprolol succinate (TOPROL-XL) 25 mg 24 hr tablet Take 1 tablet (25 mg total) by mouth daily 90 tablet 0    nicotine (NICODERM CQ) 21 mg/24 hr TD 24 hr patch Place 1 patch on the skin daily at bedtime 28 patch 0    nitroglycerin (NITROSTAT) 0 4 mg SL tablet Place 1 tablet (0 4 mg total) under the tongue every 5 (five) minutes as needed for chest pain 25 tablet 1     No current facility-administered medications for this visit  History reviewed  No pertinent past medical history       Past Surgical History:   Procedure Laterality Date    HAND SURGERY          Social History     Socioeconomic History    Marital status: /Civil Union     Spouse name: Not on file    Number of children: Not on file    Years of education: Not on file    Highest education level: Not on file   Occupational History    Not on file   Social Needs    Financial resource strain: Not on file    Food insecurity:     Worry: Not on file     Inability: Not on file    Transportation needs:     Medical: Not on file     Non-medical: Not on file   Tobacco Use    Smoking status: Current Every Day Smoker     Packs/day: 1 00     Types: Cigarettes    Smokeless tobacco: Never Used   Substance and Sexual Activity    Alcohol use: Yes     Comment: occasional    Drug use: Not Currently    Sexual activity: Not on file   Lifestyle    Physical activity:     Days per week: Not on file     Minutes per session: Not on file    Stress: Not on file   Relationships    Social connections:     Talks on phone: Not on file     Gets together: Not on file     Attends [employer] service: Not on file     Active member of club or organization: Not on file     Attends meetings of clubs or organizations: Not on file     Relationship status: Not on file    Intimate partner violence:     Fear of current or ex partner: Not on file     Emotionally abused: Not on file     Physically abused: Not on file     Forced sexual activity: Not on file   Other Topics Concern    Not on file   Social History Narrative    Not on file        Review of Systems   Constitutional: Positive for fatigue  Negative for activity change, appetite change, chills, diaphoresis, fever and unexpected weight change  Eyes: Negative for photophobia and visual disturbance  Respiratory: Negative  Cardiovascular: Negative  Gastrointestinal: Negative  Neurological: Negative  Hematological: Negative  Psychiatric/Behavioral: Negative            Objective:      /66 (BP Location: Right arm, Patient Position: Sitting, Cuff Size: Large)   Pulse 77   Temp 97 5 °F (36 4 °C) (Tympanic) Resp 16   Ht 6' 1" (1 854 m)   Wt 103 kg (227 lb)   SpO2 97%   BMI 29 95 kg/m²          Physical Exam   Constitutional: He is oriented to person, place, and time  He appears well-developed and well-nourished  HENT:   Head: Normocephalic and atraumatic  Right Ear: Tympanic membrane, external ear and ear canal normal    Left Ear: Tympanic membrane, external ear and ear canal normal    Nose: Nose normal    Mouth/Throat: Oropharynx is clear and moist    Eyes: Pupils are equal, round, and reactive to light  Conjunctivae are normal    Neck: Neck supple  Cardiovascular: Normal rate, regular rhythm and normal heart sounds  Exam reveals no gallop and no friction rub  No murmur heard  Pulmonary/Chest: Effort normal and breath sounds normal    Abdominal: Soft  Bowel sounds are normal  There is no tenderness  Musculoskeletal: He exhibits no edema  Neurological: He is alert and oriented to person, place, and time  Skin: Skin is warm and dry  Capillary refill takes less than 2 seconds  Psychiatric: He has a normal mood and affect

## 2019-11-07 DIAGNOSIS — I21.4 NSTEMI (NON-ST ELEVATED MYOCARDIAL INFARCTION) (HCC): ICD-10-CM

## 2019-11-07 DIAGNOSIS — E78.00 HYPERCHOLESTEROLEMIA: ICD-10-CM

## 2019-11-07 RX ORDER — ATORVASTATIN CALCIUM 80 MG/1
TABLET, FILM COATED ORAL
Qty: 90 TABLET | Refills: 0 | Status: SHIPPED | OUTPATIENT
Start: 2019-11-07 | End: 2020-03-11 | Stop reason: SDUPTHER

## 2019-11-07 RX ORDER — CLOPIDOGREL BISULFATE 75 MG/1
TABLET ORAL
Qty: 90 TABLET | Refills: 0 | Status: SHIPPED | OUTPATIENT
Start: 2019-11-07 | End: 2020-03-11 | Stop reason: SDUPTHER

## 2019-11-10 DIAGNOSIS — I21.4 NSTEMI (NON-ST ELEVATED MYOCARDIAL INFARCTION) (HCC): ICD-10-CM

## 2019-11-11 RX ORDER — METOPROLOL SUCCINATE 25 MG/1
TABLET, EXTENDED RELEASE ORAL
Qty: 90 TABLET | Refills: 0 | Status: SHIPPED | OUTPATIENT
Start: 2019-11-11 | End: 2020-03-11 | Stop reason: SDUPTHER

## 2019-12-25 ENCOUNTER — APPOINTMENT (EMERGENCY)
Dept: RADIOLOGY | Facility: HOSPITAL | Age: 49
End: 2019-12-25
Payer: COMMERCIAL

## 2019-12-25 ENCOUNTER — HOSPITAL ENCOUNTER (EMERGENCY)
Facility: HOSPITAL | Age: 49
Discharge: HOME | End: 2019-12-25
Attending: EMERGENCY MEDICINE | Admitting: EMERGENCY MEDICINE
Payer: COMMERCIAL

## 2019-12-25 VITALS
RESPIRATION RATE: 11 BRPM | DIASTOLIC BLOOD PRESSURE: 69 MMHG | TEMPERATURE: 98 F | HEART RATE: 63 BPM | WEIGHT: 229.72 LBS | BODY MASS INDEX: 30.45 KG/M2 | SYSTOLIC BLOOD PRESSURE: 116 MMHG | HEIGHT: 73 IN | OXYGEN SATURATION: 99 %

## 2019-12-25 DIAGNOSIS — R07.9 CHEST PAIN: Primary | ICD-10-CM

## 2019-12-25 DIAGNOSIS — R79.89 ELEVATED SERUM CREATININE: ICD-10-CM

## 2019-12-25 LAB
ALBUMIN SERPL BCP-MCNC: 4 G/DL (ref 3.5–5)
ALP SERPL-CCNC: 174 U/L (ref 46–116)
ALT SERPL W P-5'-P-CCNC: 31 U/L (ref 12–78)
ANION GAP SERPL CALCULATED.3IONS-SCNC: 9 MMOL/L (ref 4–13)
AST SERPL W P-5'-P-CCNC: 17 U/L (ref 5–45)
BASOPHILS # BLD AUTO: 0.04 THOUSANDS/ΜL (ref 0–0.1)
BASOPHILS NFR BLD AUTO: 0 % (ref 0–1)
BILIRUB SERPL-MCNC: 0.3 MG/DL (ref 0.2–1)
BUN SERPL-MCNC: 16 MG/DL (ref 5–25)
CALCIUM SERPL-MCNC: 8.2 MG/DL (ref 8.3–10.1)
CHLORIDE SERPL-SCNC: 106 MMOL/L (ref 100–108)
CO2 SERPL-SCNC: 25 MMOL/L (ref 21–32)
CREAT SERPL-MCNC: 1.42 MG/DL (ref 0.6–1.3)
D DIMER PPP FEU-MCNC: 0.35 UG/ML FEU
EOSINOPHIL # BLD AUTO: 0.1 THOUSAND/ΜL (ref 0–0.61)
EOSINOPHIL NFR BLD AUTO: 1 % (ref 0–6)
ERYTHROCYTE [DISTWIDTH] IN BLOOD BY AUTOMATED COUNT: 13.2 % (ref 11.6–15.1)
GFR SERPL CREATININE-BSD FRML MDRD: 57 ML/MIN/1.73SQ M
GLUCOSE SERPL-MCNC: 115 MG/DL (ref 65–140)
HCT VFR BLD AUTO: 48.5 % (ref 36.5–49.3)
HGB BLD-MCNC: 16 G/DL (ref 12–17)
IMM GRANULOCYTES # BLD AUTO: 0.06 THOUSAND/UL (ref 0–0.2)
IMM GRANULOCYTES NFR BLD AUTO: 1 % (ref 0–2)
LYMPHOCYTES # BLD AUTO: 3.04 THOUSANDS/ΜL (ref 0.6–4.47)
LYMPHOCYTES NFR BLD AUTO: 30 % (ref 14–44)
MAGNESIUM SERPL-MCNC: 2 MG/DL (ref 1.6–2.6)
MCH RBC QN AUTO: 32.1 PG (ref 26.8–34.3)
MCHC RBC AUTO-ENTMCNC: 33 G/DL (ref 31.4–37.4)
MCV RBC AUTO: 97 FL (ref 82–98)
MONOCYTES # BLD AUTO: 0.44 THOUSAND/ΜL (ref 0.17–1.22)
MONOCYTES NFR BLD AUTO: 4 % (ref 4–12)
NEUTROPHILS # BLD AUTO: 6.36 THOUSANDS/ΜL (ref 1.85–7.62)
NEUTS SEG NFR BLD AUTO: 64 % (ref 43–75)
NRBC BLD AUTO-RTO: 0 /100 WBCS
NT-PROBNP SERPL-MCNC: 23 PG/ML
PLATELET # BLD AUTO: 213 THOUSANDS/UL (ref 149–390)
PMV BLD AUTO: 10.1 FL (ref 8.9–12.7)
POTASSIUM SERPL-SCNC: 3.6 MMOL/L (ref 3.5–5.3)
PROT SERPL-MCNC: 7.6 G/DL (ref 6.4–8.2)
RBC # BLD AUTO: 4.99 MILLION/UL (ref 3.88–5.62)
SODIUM SERPL-SCNC: 140 MMOL/L (ref 136–145)
TROPONIN I SERPL-MCNC: <0.02 NG/ML
TROPONIN I SERPL-MCNC: <0.02 NG/ML
WBC # BLD AUTO: 10.04 THOUSAND/UL (ref 4.31–10.16)

## 2019-12-25 PROCEDURE — 96361 HYDRATE IV INFUSION ADD-ON: CPT

## 2019-12-25 PROCEDURE — 80053 COMPREHEN METABOLIC PANEL: CPT | Performed by: PHYSICIAN ASSISTANT

## 2019-12-25 PROCEDURE — 85025 COMPLETE CBC W/AUTO DIFF WBC: CPT | Performed by: PHYSICIAN ASSISTANT

## 2019-12-25 PROCEDURE — 84484 ASSAY OF TROPONIN QUANT: CPT | Performed by: PHYSICIAN ASSISTANT

## 2019-12-25 PROCEDURE — 96360 HYDRATION IV INFUSION INIT: CPT

## 2019-12-25 PROCEDURE — 83735 ASSAY OF MAGNESIUM: CPT | Performed by: PHYSICIAN ASSISTANT

## 2019-12-25 PROCEDURE — 71046 X-RAY EXAM CHEST 2 VIEWS: CPT

## 2019-12-25 PROCEDURE — 99284 EMERGENCY DEPT VISIT MOD MDM: CPT | Performed by: PHYSICIAN ASSISTANT

## 2019-12-25 PROCEDURE — 99285 EMERGENCY DEPT VISIT HI MDM: CPT

## 2019-12-25 PROCEDURE — 36415 COLL VENOUS BLD VENIPUNCTURE: CPT | Performed by: PHYSICIAN ASSISTANT

## 2019-12-25 PROCEDURE — 85379 FIBRIN DEGRADATION QUANT: CPT | Performed by: PHYSICIAN ASSISTANT

## 2019-12-25 PROCEDURE — 93005 ELECTROCARDIOGRAM TRACING: CPT

## 2019-12-25 PROCEDURE — 83880 ASSAY OF NATRIURETIC PEPTIDE: CPT | Performed by: PHYSICIAN ASSISTANT

## 2019-12-25 RX ORDER — SODIUM CHLORIDE 9 MG/ML
3 INJECTION INTRAVENOUS AS NEEDED
Status: DISCONTINUED | OUTPATIENT
Start: 2019-12-25 | End: 2019-12-25 | Stop reason: HOSPADM

## 2019-12-25 RX ORDER — ASPIRIN 81 MG/1
243 TABLET, CHEWABLE ORAL ONCE
Status: COMPLETED | OUTPATIENT
Start: 2019-12-25 | End: 2019-12-25

## 2019-12-25 RX ORDER — ONDANSETRON 2 MG/ML
4 INJECTION INTRAMUSCULAR; INTRAVENOUS ONCE AS NEEDED
Status: DISCONTINUED | OUTPATIENT
Start: 2019-12-25 | End: 2019-12-25 | Stop reason: HOSPADM

## 2019-12-25 RX ADMIN — SODIUM CHLORIDE 1000 ML: 0.9 INJECTION, SOLUTION INTRAVENOUS at 14:15

## 2019-12-25 RX ADMIN — ASPIRIN 81 MG 243 MG: 81 TABLET ORAL at 13:36

## 2019-12-25 NOTE — ED NOTES
Pt denies dizziness  States he feels much better  Ambulated to bathroom  Tolerated well        Sukumar Villalba RN  12/25/19 [id number]

## 2019-12-25 NOTE — ED PROVIDER NOTES
History  Chief Complaint   Patient presents with    Chest Pain     Pt reports he woke up with pain in his chest  Nausea,sob, and dizziness     47year old male presents ambulatory from home with spouse for evaluation of chest pain  Symptoms started this morning when he woke up around 7 am   He c/o pinching sensation in both right and left chest around his breasts  Does not radiate  He reports feeling SOB with this as well as nausea and dizziness  Denies vomiting, abdominal pain  Spouse notes he drank some beer last night and didn't feel well after this  Pt felt lousy upon waking and contributed to drinking last night  Had a prior heart attack over the summer and notes he had heart catherization and stent placed  Has been taking atorvastatin, metoprolol and plavix as prescribed in addition to a baby aspirin daily  Has been out of his nitroglycerin tabs  Denies fever, chills, cough, congestion or recent illness  Pt notes increased anxiety with this pain due to his heart problems he had over the summer  PMH includes HTN, HLD, NSTEMI s/p stent placement 6/10/19, prediabetes  Pt is current every day smoker        History provided by:  Patient and spouse   used: Yes (spouse interprets for patient; declined language line)    Chest Pain   Pain location:  R chest and L chest  Pain quality comment:  "pinching"  Pain radiates to:  Does not radiate  Pain radiates to the back: no    Duration:  6 hours  Timing:  Constant  Progression:  Improving  Chronicity:  New  Context: at rest    Context: not lifting, no stress and no trauma    Relieved by:  None tried  Worsened by:  Nothing tried  Associated symptoms: anxiety, dizziness, fatigue, nausea and shortness of breath    Associated symptoms: no abdominal pain, no back pain, no cough, no diaphoresis, no fever, no headache, no heartburn, no lower extremity edema, no numbness, no palpitations, no syncope and not vomiting    Risk factors: coronary artery disease, high cholesterol, hypertension, male sex, obesity and smoking    Risk factors: no diabetes mellitus, no prior DVT/PE and no surgery        Prior to Admission Medications   Prescriptions Last Dose Informant Patient Reported? Taking?   aspirin 81 mg chewable tablet 12/25/2019 at Unknown time Self No Yes   Sig: Chew 1 tablet (81 mg total) daily   atorvastatin (LIPITOR) 80 mg tablet 12/25/2019 at Unknown time  No Yes   Sig: TAKE 1 TABLET BY MOUTH DAILY WITH DINNER   clopidogrel (PLAVIX) 75 mg tablet 12/25/2019 at Unknown time  No Yes   Sig: TAKE 1 TABLET BY MOUTH EVERY DAY   metoprolol succinate (TOPROL-XL) 25 mg 24 hr tablet 12/25/2019 at Unknown time  No Yes   Sig: TAKE 1 TABLET BY MOUTH EVERY DAY   nitroglycerin (NITROSTAT) 0 4 mg SL tablet 12/25/2019 at Unknown time  No Yes   Sig: Place 1 tablet (0 4 mg total) under the tongue every 5 (five) minutes as needed for chest pain      Facility-Administered Medications: None       Past Medical History:   Diagnosis Date    Hyperlipidemia     Hypertension     MI (myocardial infarction) ([facility] [address] )        Past Surgical History:   Procedure Laterality Date    CORONARY ANGIOPLASTY WITH STENT PLACEMENT      HAND SURGERY         History reviewed  No pertinent family history  I have reviewed and agree with the history as documented  Social History     Tobacco Use    Smoking status: Current Every Day Smoker     Packs/day: 1 00     Types: Cigarettes    Smokeless tobacco: Never Used   Substance Use Topics    Alcohol use: Yes     Comment: occasional    Drug use: Not Currently        Review of Systems   Constitutional: Positive for fatigue  Negative for chills, diaphoresis and fever  HENT: Negative  Negative for congestion, rhinorrhea and sore throat  Eyes: Negative  Negative for visual disturbance  Respiratory: Positive for shortness of breath  Negative for cough and wheezing  Cardiovascular: Positive for chest pain   Negative for palpitations, leg swelling and syncope  Gastrointestinal: Positive for nausea  Negative for abdominal pain, constipation, diarrhea, heartburn and vomiting  Genitourinary: Negative  Negative for dysuria, flank pain, frequency and hematuria  Musculoskeletal: Negative  Negative for back pain, myalgias and neck pain  Skin: Negative  Negative for rash  Neurological: Positive for dizziness  Negative for syncope, light-headedness, numbness and headaches  Psychiatric/Behavioral: Negative for confusion  The patient is nervous/anxious  All other systems reviewed and are negative  Physical Exam  Physical Exam   Constitutional: He is oriented to person, place, and time  He appears well-developed and well-nourished  Non-toxic appearance  No distress  HENT:   Head: Normocephalic and atraumatic  Right Ear: Hearing, tympanic membrane, external ear and ear canal normal    Left Ear: Hearing, tympanic membrane, external ear and ear canal normal    Nose: Nose normal    Mouth/Throat: Uvula is midline, oropharynx is clear and moist and mucous membranes are normal  No oropharyngeal exudate  Eyes: Pupils are equal, round, and reactive to light  Conjunctivae and EOM are normal  No scleral icterus  Neck: Trachea normal and normal range of motion  Neck supple  No JVD present  No tracheal deviation present  Cardiovascular: Normal rate, regular rhythm, normal heart sounds, intact distal pulses and normal pulses  No murmur heard  Pulmonary/Chest: Effort normal and breath sounds normal  No respiratory distress  He has no wheezes  He has no rhonchi  He has no rales  He exhibits no tenderness  Abdominal: Soft  Normal appearance and bowel sounds are normal  He exhibits no distension  There is no hepatosplenomegaly  There is no tenderness  There is no rigidity, no rebound, no guarding and no CVA tenderness  No hernia  Musculoskeletal: Normal range of motion  He exhibits no edema or tenderness          Right lower leg: Normal  He exhibits no tenderness and no edema  Left lower leg: Normal  He exhibits no tenderness and no edema  Neurological: He is alert and oriented to person, place, and time  He has normal reflexes  No cranial nerve deficit or sensory deficit  He exhibits normal muscle tone  Gait normal  GCS eye subscore is 4  GCS verbal subscore is 5  GCS motor subscore is 6  Skin: Skin is warm and dry  Capillary refill takes less than 2 seconds  No rash noted  He is not diaphoretic  No cyanosis  Nails show no clubbing  Psychiatric: He has a normal mood and affect  His speech is normal and behavior is normal    Nursing note and vitals reviewed        Vital Signs  ED Triage Vitals [12/25/19 1324]   Temperature Pulse Respirations Blood Pressure SpO2   98 °F (36 7 °C) 77 19 128/69 99 %      Temp Source Heart Rate Source Patient Position - Orthostatic VS BP Location FiO2 (%)   Temporal Monitor Sitting Left arm --      Pain Score       4           Vitals:    12/25/19 1530 12/25/19 1600 12/25/19 1630 12/25/19 1700   BP: 118/76 125/61 113/75 116/69   Pulse: 65 62 68 63   Patient Position - Orthostatic VS: Sitting Lying           Visual Acuity      ED Medications  Medications   sodium chloride (PF) 0 9 % injection 3 mL (has no administration in time range)   ondansetron (ZOFRAN) injection 4 mg (has no administration in time range)   aspirin chewable tablet 243 mg (243 mg Oral Given 12/25/19 1336)   sodium chloride 0 9 % bolus 1,000 mL (0 mL Intravenous Stopped 12/25/19 1552)       Diagnostic Studies  Results Reviewed     Procedure Component Value Units Date/Time    Troponin I [[id number]]  (Normal) Collected:  12/25/19 1625    Lab Status:  Final result Specimen:  Blood from Arm, Right Updated:  12/25/19 1650     Troponin I <0 02 ng/mL     D-dimer, quantitative [[id number]]  (Normal) Collected:  12/25/19 1333    Lab Status:  Final result Specimen:  Blood from Arm, Right Updated:  12/25/19 1402     D-Dimer, Quant 0 35 ug/ml FEU     NT-BNP PRO [[id number]]  (Normal) Collected:  12/25/19 1333    Lab Status:  Final result Specimen:  Blood from Arm, Right Updated:  12/25/19 1400     NT-proBNP 23 pg/mL     Magnesium [[id number]]  (Normal) Collected:  12/25/19 1333    Lab Status:  Final result Specimen:  Blood from Arm, Right Updated:  12/25/19 1400     Magnesium 2 0 mg/dL     Troponin I [[id number]]  (Normal) Collected:  12/25/19 1333    Lab Status:  Final result Specimen:  Blood from Arm, Right Updated:  12/25/19 1356     Troponin I <0 02 ng/mL     Comprehensive metabolic panel [[id number]]  (Abnormal) Collected:  12/25/19 1333    Lab Status:  Final result Specimen:  Blood from Arm, Right Updated:  12/25/19 1353     Sodium 140 mmol/L      Potassium 3 6 mmol/L      Chloride 106 mmol/L      CO2 25 mmol/L      ANION GAP 9 mmol/L      BUN 16 mg/dL      Creatinine 1 42 mg/dL      Glucose 115 mg/dL      Calcium 8 2 mg/dL      AST 17 U/L      ALT 31 U/L      Alkaline Phosphatase 174 U/L      Total Protein 7 6 g/dL      Albumin 4 0 g/dL      Total Bilirubin 0 30 mg/dL      eGFR 57 ml/min/1 73sq m     Narrative:       [place] guidelines for Chronic Kidney Disease (CKD):     Stage 1 with normal or high GFR (GFR > 90 mL/min/1 73 square meters)    Stage 2 Mild CKD (GFR = 60-89 mL/min/1 73 square meters)    Stage 3A Moderate CKD (GFR = 45-59 mL/min/1 73 square meters)    Stage 3B Moderate CKD (GFR = 30-44 mL/min/1 73 square meters)    Stage 4 Severe CKD (GFR = 15-29 mL/min/1 73 square meters)    Stage 5 End Stage CKD (GFR <15 mL/min/1 73 square meters)  Note: GFR calculation is accurate only with a steady state creatinine    CBC and differential [[id number]] Collected:  12/25/19 1333    Lab Status:  Final result Specimen:  Blood from Arm, Right Updated:  12/25/19 1338     WBC 10 04 Thousand/uL      RBC 4 99 Million/uL      Hemoglobin 16 0 g/dL      Hematocrit 48 5 %      MCV 97 fL      MCH 32 1 pg      MCHC 33 0 g/dL      RDW 13 2 % MPV 10 1 fL      Platelets 860 Thousands/uL      nRBC 0 /100 WBCs      Neutrophils Relative 64 %      Immat GRANS % 1 %      Lymphocytes Relative 30 %      Monocytes Relative 4 %      Eosinophils Relative 1 %      Basophils Relative 0 %      Neutrophils Absolute 6 36 Thousands/µL      Immature Grans Absolute 0 06 Thousand/uL      Lymphocytes Absolute 3 04 Thousands/µL      Monocytes Absolute 0 44 Thousand/µL      Eosinophils Absolute 0 10 Thousand/µL      Basophils Absolute 0 04 Thousands/µL                  X-ray chest 2 views   Final Result by Nadine Hendricks MD (12/25 6896)      No acute cardiopulmonary disease  Workstation performed: [id number]                    Procedures  ECG 12 Lead Documentation Only  Date/Time: 12/25/2019 1:22 PM  Performed by: Orion Whiting PA-C  Authorized by: Orion Whiting PA-C     Indications / Diagnosis:  Chest pain  ECG reviewed by me, the ED Provider: yes    Patient location:  ED  Previous ECG:     Comparison to cardiac monitor: Yes    Interpretation:     Interpretation: normal    Rate:     ECG rate:  79    ECG rate assessment: normal    Rhythm:     Rhythm: sinus rhythm    Ectopy:     Ectopy: none    QRS:     QRS axis:  Normal    QRS intervals:  Normal  Conduction:     Conduction: normal    ST segments:     ST segments:  Normal  T waves:     T waves: normal    Comments:      , QRS 94, QT/QTc 400/458; no acute ischemic changes  Similar to prior EKG 6/11/19 except increase in ventricular rate, previously sinus jf               ED Course  ED Course as of Dec 25 1738   Wed Dec 25, 2019   1342 WBC: 10 04   1342 Hemoglobin: 16 0   1342 Platelet Count: 708   1354 Glucose, Random: 115   1354 Increase from 1 13 three months ago   Creatinine(!): 1 42   1354 BUN: 16   1354 Sodium: 140   1354 Potassium: 3 6   1354 Chloride: 106   1354 CO2: 25   1354 Anion Gap: 9   1355 Calcium(!): 8 2   1355 AST: 17   1355 ALT: 31   1355 Alkaline Phosphatase(!): 174   1355 Total Protein: 7 6   1355 Albumin: 4 0   1355 TOTAL BILIRUBIN: 0 30   1355 eGFR: 57   1357 Troponin I: <0 02   1400 NT-proBNP: 23   1400 Magnesium: 2 0   1403 D-Dimer, Quant: 0 35   1405 Reviewed cardiac cath report from 6/10/19 - CORONARY VESSELS:   --  The coronary circulation is right dominant  --  There was 1-vessel coronary artery disease  --  Left main: Normal   --  LAD: Angiography showed minor luminal irregularities  --  1st obtuse marginal: There was a 90 % stenosis  Remainder circumflex nonobstructive  --  RCA: Angiography showed minor luminal irregularities      IMPRESSIONS:  PCI of OM with RORY     RECOMMENDATIONS  asa 81 indefinitely, plavix x 1 year      1426 Independently viewed and interpreted by me - no acute cardiopulmonary process, no interval change from prior portable chest; pending official read  X-ray chest 2 views   1430 Findings reviewed with pt and spouse  Pt notes feeling improved  He contributes his symptoms to drinking too much beer and smoking too much last night  He denies chest pain at this time  Due to risk factors, recommended obs admission, however pt refuses  However he would be agreeable to staying for repeat troponin at the 3hr [place]  1644 Repeat troponin in process  Pt continues to feel well       1701 Troponin I: <0 02   1701 Repeat troponin negative  Findings reviewed with pt and spouse  Pt hemodynamically stable and feels improved  Strict return precautions outlined  Advised to continue home meds as prescribed  Smoking cessation encouraged  Pt was advised to stay well hydrated  Recommended f/u with PCP and have kidney function repeated  Advised to avoid NSAIDs and nephrotoxic agents  Should f/u outpatient with PCP or return to ER as needed  Pt and spouse verbalized understanding and had no further questions  Pt left in stable, improved condition      HEART Risk Score      Most Recent Value   History  1 Filed at: 12/25/2019 1335   ECG  0 Filed at: 12/25/2019 1335   Age  1 Filed at: 12/25/2019 1335   Risk Factors  2 Filed at: 12/25/2019 1335   Troponin  0 Filed at: 12/25/2019 1335   Heart Score Risk Calculator   History  1 Filed at: 12/25/2019 1335   ECG  0 Filed at: 12/25/2019 1335   Age  1 Filed at: 12/25/2019 1335   Risk Factors  2 Filed at: 12/25/2019 1335   Troponin  0 Filed at: 12/25/2019 1335   HEART Score  4 Filed at: 12/25/2019 1335   HEART Score  4 Filed at: 12/25/2019 1335                      Yoselin Zaldivar' Criteria for PE      Most Recent Value   Wells' Criteria for PE   Clinical signs and symptoms of DVT  0 Filed at: 12/25/2019 1335   PE is primary diagnosis or equally likely  0 Filed at: 12/25/2019 1335   HR >100  0 Filed at: 12/25/2019 1335   Immobilization at least 3 days or Surgery in the previous 4 weeks  0 Filed at: 12/25/2019 1335   Previous, objectively diagnosed PE or DVT  0 Filed at: 12/25/2019 1335   Hemoptysis  0 Filed at: 12/25/2019 1335   Malignancy with treatment within 6 months or palliative  0 Filed at: 12/25/2019 1335   Wells' Criteria Total  0 Filed at: 12/25/2019 1335            [facility]  Number of Diagnoses or Management Options  Chest pain: new and requires workup  Elevated serum creatinine: new and requires workup     Amount and/or Complexity of Data Reviewed  Clinical lab tests: ordered and reviewed  Tests in the radiology section of CPT®: ordered and reviewed  Decide to obtain previous medical records or to obtain history from someone other than the patient: yes  Obtain history from someone other than the patient: yes  Review and summarize past medical records: yes  Independent visualization of images, tracings, or specimens: yes    Patient Progress  Patient progress: improved        Disposition  Final diagnoses:   Chest pain   Elevated serum creatinine     Time reflects when diagnosis was documented in both MDM as applicable and the Disposition within this note     Time User Action Codes Description Comment    12/25/2019  2:40 PM Miki Hilario Add [R07 9] Chest pain     12/25/2019  2:41 PM Miki Hilario Add [R79 89] Elevated serum creatinine       ED Disposition     ED Disposition Condition Date/Time Comment    Discharge Stable Wed Dec 25, 2019  5:03 PM Yareli Christie discharge to home/self care  Follow-up Information     Follow up With Specialties Details Why Contact Info Additional [address], [place] Physician Assistant Schedule an appointment as soon as possible for a visit   [address] [facility] [address]  [address]  [facility]  Emergency Department Emergency Medicine  As needed [address] [phone]  [phone] [place] ED, [facility] [address], [address] [place] [facility], [address] [facility], [id number]          Discharge Medication List as of 12/25/2019  5:05 PM      CONTINUE these medications which have NOT CHANGED    Details   aspirin 81 mg chewable tablet Chew 1 tablet (81 mg total) daily, Starting Wed 6/12/2019, No Print      atorvastatin (LIPITOR) 80 mg tablet TAKE 1 TABLET BY MOUTH DAILY WITH DINNER, Normal      clopidogrel (PLAVIX) 75 mg tablet TAKE 1 TABLET BY MOUTH EVERY DAY, Normal      metoprolol succinate (TOPROL-XL) 25 mg 24 hr tablet TAKE 1 TABLET BY MOUTH EVERY DAY, Normal      nitroglycerin (NITROSTAT) 0 4 mg SL tablet Place 1 tablet (0 4 mg total) under the tongue every 5 (five) minutes as needed for chest pain, Starting Fri 8/16/2019, Normal           No discharge procedures on file      ED Provider  Electronically Signed by           Tessa Bergeron PA-C  12/25/19 1106

## 2019-12-25 NOTE — ED NOTES
Warm blanket provided to patient  Call bell in reach   Family at bedside     Angel Donovan, [address] [facility]  12/25/19 7198

## 2019-12-25 NOTE — DISCHARGE INSTRUCTIONS
Drink plenty of fluids  Continue home meds as prescribed  Schedule follow up with your PCP  You will need repeat of your kidney function  Follow up outpatient or return to ER as needed

## 2019-12-26 LAB
ATRIAL RATE: 79 BPM
P AXIS: 51 DEGREES
PR INTERVAL: 154 MS
QRS AXIS: 23 DEGREES
QRSD INTERVAL: 94 MS
QT INTERVAL: 400 MS
QTC INTERVAL: 458 MS
T WAVE AXIS: 15 DEGREES
VENTRICULAR RATE: 79 BPM

## 2019-12-26 PROCEDURE — 93010 ELECTROCARDIOGRAM REPORT: CPT | Performed by: INTERNAL MEDICINE

## 2020-02-17 DIAGNOSIS — Z12.11 SCREENING FOR COLON CANCER: Primary | ICD-10-CM

## 2020-03-11 DIAGNOSIS — I21.4 NSTEMI (NON-ST ELEVATED MYOCARDIAL INFARCTION) (HCC): ICD-10-CM

## 2020-03-11 DIAGNOSIS — E78.00 HYPERCHOLESTEROLEMIA: ICD-10-CM

## 2020-03-11 RX ORDER — ATORVASTATIN CALCIUM 80 MG/1
80 TABLET, FILM COATED ORAL
Qty: 90 TABLET | Refills: 0 | Status: SHIPPED | OUTPATIENT
Start: 2020-03-11 | End: 2020-04-10 | Stop reason: SDUPTHER

## 2020-03-11 RX ORDER — METOPROLOL SUCCINATE 25 MG/1
25 TABLET, EXTENDED RELEASE ORAL DAILY
Qty: 90 TABLET | Refills: 0 | Status: SHIPPED | OUTPATIENT
Start: 2020-03-11 | End: 2020-04-10 | Stop reason: SDUPTHER

## 2020-03-11 RX ORDER — CLOPIDOGREL BISULFATE 75 MG/1
75 TABLET ORAL DAILY
Qty: 90 TABLET | Refills: 0 | Status: SHIPPED | OUTPATIENT
Start: 2020-03-11 | End: 2020-04-10 | Stop reason: SDUPTHER

## 2020-03-18 ENCOUNTER — OFFICE VISIT (OUTPATIENT)
Dept: FAMILY MEDICINE CLINIC | Facility: CLINIC | Age: 50
End: 2020-03-18
Payer: COMMERCIAL

## 2020-03-18 VITALS
HEART RATE: 64 BPM | DIASTOLIC BLOOD PRESSURE: 78 MMHG | OXYGEN SATURATION: 97 % | WEIGHT: 217.4 LBS | HEIGHT: 73 IN | TEMPERATURE: 99.1 F | SYSTOLIC BLOOD PRESSURE: 114 MMHG | RESPIRATION RATE: 18 BRPM | BODY MASS INDEX: 28.81 KG/M2

## 2020-03-18 DIAGNOSIS — I25.2 HISTORY OF NON-ST ELEVATION MYOCARDIAL INFARCTION (NSTEMI): ICD-10-CM

## 2020-03-18 DIAGNOSIS — R79.89 ELEVATED SERUM CREATININE: ICD-10-CM

## 2020-03-18 DIAGNOSIS — M54.6 BILATERAL THORACIC BACK PAIN, UNSPECIFIED CHRONICITY: Primary | ICD-10-CM

## 2020-03-18 DIAGNOSIS — Z72.0 TOBACCO ABUSE: ICD-10-CM

## 2020-03-18 PROCEDURE — 99214 OFFICE O/P EST MOD 30 MIN: CPT | Performed by: FAMILY MEDICINE

## 2020-03-18 RX ORDER — NITROGLYCERIN 0.4 MG/1
0.4 TABLET SUBLINGUAL
Qty: 25 TABLET | Refills: 1 | Status: SHIPPED | OUTPATIENT
Start: 2020-03-18 | End: 2020-04-10 | Stop reason: SDUPTHER

## 2020-03-18 NOTE — PROGRESS NOTES
Assessment/Plan:     Diagnoses and all orders for this visit:    Bilateral thoracic back pain, unspecified chronicity  -     XR spine thoracic 3 vw; Future  -     diclofenac sodium (VOLTAREN) 1 %; Apply 2 g topically 4 (four) times a day    History of non-ST elevation myocardial infarction (NSTEMI)  -     nitroglycerin (NITROSTAT) 0 4 mg SL tablet; Place 1 tablet (0 4 mg total) under the tongue every 5 (five) minutes as needed for chest pain  -     CBC and differential; Future  -     Comprehensive metabolic panel; Future    Tobacco abuse    Elevated serum creatinine        - Angina, relieved with nitroglycerin  Patient advised to follow up with cardiology  Appointment scheduled for 4/10  ER if symptoms worsen  - Continue ASA, plavix, metoprolol, and atorvastatin  - Will check thoracic xray  Diclofenac gel, ibuprofen/tylenol for pain  - Will check cbc and cmp  - Encouraged smoking cessation  Return in about 3 months (around 6/18/2020) for Next scheduled follow up  Subjective:        Patient ID: Jon Aviles is a 47 y o  male  Chief Complaint   Patient presents with    Follow-up     intermittent chest wall and back pain       Kerline Ruiz is a 47year old male with history of NSTEMI s/p stent 6/2019, HTN, HLD, and tobacco abuse, presenting with intermittent chest and back pain  States he gets intermittent chest pains, sometimes sharp, sometimes heavy  Occurs with exertion, typically when lifting heavy objects at work  Pain is relieved with nitroglycerin  States that he only has one tablet left and needs a refill  Has not had follow up with cardiology since 6/2019  Remains on plavix 75 mg and ASA 81 mg daily, s/p stent  HTN managed with metoprolol 25 mg daily  HLD managed with atorvastatin 80 mg daily  Patient was seen in the ED 12/2019 with chest pain  Found to have elevated creatinine of 1 42  Has not had follow up since  Reports h/o chronic back pain   States he was previously in therapy because his back was "out of line"  Pain has been worse over the past week  Denies inciting injury  Has been taking Aleve and applying Bengay which he states helps  Previously smoking 2ppd, now down to 1ppd  The following portions of the patient's history were reviewed and updated as appropriate: allergies, current medications, past family history, past medical history, past social history, past surgical history and problem list     Patient Active Problem List   Diagnosis    History of non-ST elevation myocardial infarction (NSTEMI)    Tobacco abuse    Hypercholesterolemia    Blurry vision, bilateral    Fatigue    Overweight    Prediabetes       Current Outpatient Medications   Medication Sig Dispense Refill    aspirin 81 mg chewable tablet Chew 1 tablet (81 mg total) daily  0    atorvastatin (LIPITOR) 80 mg tablet Take 1 tablet (80 mg total) by mouth daily with dinner 90 tablet 0    clopidogrel (PLAVIX) 75 mg tablet Take 1 tablet (75 mg total) by mouth daily 90 tablet 0    metoprolol succinate (TOPROL-XL) 25 mg 24 hr tablet Take 1 tablet (25 mg total) by mouth daily 90 tablet 0    nitroglycerin (NITROSTAT) 0 4 mg SL tablet Place 1 tablet (0 4 mg total) under the tongue every 5 (five) minutes as needed for chest pain 25 tablet 1    diclofenac sodium (VOLTAREN) 1 % Apply 2 g topically 4 (four) times a day 100 g 1     No current facility-administered medications for this visit           Past Medical History:   Diagnosis Date    Hyperlipidemia     Hypertension     MI [facility]myocardial infarction) ([facility] [address] )         Past Surgical History:   Procedure Laterality Date    CORONARY ANGIOPLASTY WITH STENT PLACEMENT      HAND SURGERY          Social History     Socioeconomic History    Marital status: /Civil Union     Spouse name: Not on file    Number of children: Not on file    Years of education: Not on file    Highest education level: Not on file   Occupational History    Not on file Social Needs    Financial resource strain: Not on file    Food insecurity:     Worry: Not on file     Inability: Not on file    Transportation needs:     Medical: Not on file     Non-medical: Not on file   Tobacco Use    Smoking status: Current Every Day Smoker     Packs/day: 1 00     Types: Cigarettes    Smokeless tobacco: Never Used   Substance and Sexual Activity    Alcohol use: Yes     Comment: occasional    Drug use: Not Currently    Sexual activity: Yes     Partners: Female   Lifestyle    Physical activity:     Days per week: Not on file     Minutes per session: Not on file    Stress: Not on file   Relationships    Social connections:     Talks on phone: Not on file     Gets together: Not on file     Attends [employer] service: Not on file     Active member of club or organization: Not on file     Attends meetings of clubs or organizations: Not on file     Relationship status: Not on file    Intimate partner violence:     Fear of current or ex partner: Not on file     Emotionally abused: Not on file     Physically abused: Not on file     Forced sexual activity: Not on file   Other Topics Concern    Not on file   Social History Narrative    Not on file        Review of Systems   Constitutional: Negative for chills, diaphoresis, fever and unexpected weight change  HENT: Negative for congestion, ear pain, postnasal drip, rhinorrhea, sinus pressure, sinus pain and sore throat  Eyes: Negative for photophobia, pain, discharge, redness, itching and visual disturbance  Respiratory: Negative for cough, chest tightness, shortness of breath and wheezing  Cardiovascular: Positive for chest pain (intermittent)  Negative for palpitations and leg swelling  Gastrointestinal: Negative for abdominal pain, constipation, diarrhea, nausea and vomiting  Musculoskeletal: Positive for back pain (thoracic)  Negative for gait problem, joint swelling, neck pain and neck stiffness     Skin: Negative for rash and wound  Neurological: Negative for dizziness, syncope, weakness, light-headedness and headaches  Objective:      /78 (BP Location: Left arm, Patient Position: Sitting, Cuff Size: Large)   Pulse 64   Temp 99 1 °F (37 3 °C) (Tympanic)   Resp 18   Ht 6' 1" (1 854 m)   Wt 98 6 kg (217 lb 6 4 oz)   SpO2 97%   BMI 28 68 kg/m²          Physical Exam   Constitutional: He is oriented to person, place, and time  He appears well-developed and well-nourished  No distress  HENT:   Head: Normocephalic and atraumatic  Right Ear: External ear normal    Left Ear: External ear normal    Mouth/Throat: Oropharynx is clear and moist  No oropharyngeal exudate  Eyes: Pupils are equal, round, and reactive to light  Conjunctivae and EOM are normal    Neck: Normal range of motion  Neck supple  Cardiovascular: Normal rate, regular rhythm and normal heart sounds  No murmur heard  Pulmonary/Chest: Effort normal and breath sounds normal  No respiratory distress  He has no wheezes  Musculoskeletal: Normal range of motion  He exhibits tenderness  He exhibits no edema  Midline thoracic spine and para spinal muscle tenderness to palpation  Lymphadenopathy:     He has no cervical adenopathy  Neurological: He is alert and oriented to person, place, and time  Skin: Skin is warm and dry  No rash noted  No erythema  Psychiatric: He has a normal mood and affect  Nursing note and vitals reviewed

## 2020-04-08 ENCOUNTER — TELEPHONE (OUTPATIENT)
Dept: CARDIOLOGY CLINIC | Facility: CLINIC | Age: 50
End: 2020-04-08

## 2020-04-10 ENCOUNTER — TELEPHONE (OUTPATIENT)
Dept: CARDIOLOGY CLINIC | Facility: CLINIC | Age: 50
End: 2020-04-10

## 2020-04-10 ENCOUNTER — TELEMEDICINE (OUTPATIENT)
Dept: CARDIOLOGY CLINIC | Facility: CLINIC | Age: 50
End: 2020-04-10
Payer: COMMERCIAL

## 2020-04-10 VITALS — BODY MASS INDEX: 27.71 KG/M2 | WEIGHT: 210 LBS

## 2020-04-10 DIAGNOSIS — E66.3 OVERWEIGHT: ICD-10-CM

## 2020-04-10 DIAGNOSIS — I21.4 NSTEMI (NON-ST ELEVATED MYOCARDIAL INFARCTION) (HCC): ICD-10-CM

## 2020-04-10 DIAGNOSIS — E78.00 HYPERCHOLESTEROLEMIA: Primary | ICD-10-CM

## 2020-04-10 DIAGNOSIS — I25.2 HISTORY OF NON-ST ELEVATION MYOCARDIAL INFARCTION (NSTEMI): ICD-10-CM

## 2020-04-10 PROCEDURE — G2012 BRIEF CHECK IN BY MD/QHP: HCPCS

## 2020-04-10 RX ORDER — ATORVASTATIN CALCIUM 80 MG/1
80 TABLET, FILM COATED ORAL
Qty: 90 TABLET | Refills: 3 | Status: SHIPPED | OUTPATIENT
Start: 2020-04-10 | End: 2021-05-17

## 2020-04-10 RX ORDER — NITROGLYCERIN 0.4 MG/1
0.4 TABLET SUBLINGUAL
Qty: 25 TABLET | Refills: 3 | Status: SHIPPED | OUTPATIENT
Start: 2020-04-10 | End: 2020-06-23 | Stop reason: SDUPTHER

## 2020-04-10 RX ORDER — CLOPIDOGREL BISULFATE 75 MG/1
75 TABLET ORAL DAILY
Qty: 90 TABLET | Refills: 3 | Status: SHIPPED | OUTPATIENT
Start: 2020-04-10 | End: 2021-06-14

## 2020-04-10 RX ORDER — METOPROLOL SUCCINATE 25 MG/1
25 TABLET, EXTENDED RELEASE ORAL DAILY
Qty: 90 TABLET | Refills: 3 | Status: SHIPPED | OUTPATIENT
Start: 2020-04-10 | End: 2021-05-17

## 2020-04-10 RX ORDER — ASPIRIN 81 MG/1
81 TABLET, CHEWABLE ORAL DAILY
Qty: 90 TABLET | Refills: 3 | Status: SHIPPED | OUTPATIENT
Start: 2020-04-10 | End: 2021-08-04 | Stop reason: SDUPTHER

## 2020-04-12 ENCOUNTER — APPOINTMENT (EMERGENCY)
Dept: RADIOLOGY | Facility: HOSPITAL | Age: 50
End: 2020-04-12
Payer: COMMERCIAL

## 2020-04-12 ENCOUNTER — HOSPITAL ENCOUNTER (EMERGENCY)
Facility: HOSPITAL | Age: 50
Discharge: HOME | End: 2020-04-12
Attending: EMERGENCY MEDICINE | Admitting: EMERGENCY MEDICINE
Payer: COMMERCIAL

## 2020-04-12 VITALS
TEMPERATURE: 99 F | DIASTOLIC BLOOD PRESSURE: 68 MMHG | WEIGHT: 208.56 LBS | HEART RATE: 56 BPM | BODY MASS INDEX: 27.52 KG/M2 | RESPIRATION RATE: 16 BRPM | OXYGEN SATURATION: 98 % | SYSTOLIC BLOOD PRESSURE: 115 MMHG

## 2020-04-12 DIAGNOSIS — R07.9 CHEST PAIN: Primary | ICD-10-CM

## 2020-04-12 LAB
ALBUMIN SERPL BCP-MCNC: 4 G/DL (ref 3.5–5)
ALP SERPL-CCNC: 122 U/L (ref 46–116)
ALT SERPL W P-5'-P-CCNC: 48 U/L (ref 12–78)
ANION GAP SERPL CALCULATED.3IONS-SCNC: 7 MMOL/L (ref 4–13)
APTT PPP: 28 SECONDS (ref 23–37)
AST SERPL W P-5'-P-CCNC: 24 U/L (ref 5–45)
ATRIAL RATE: 60 BPM
BASOPHILS # BLD AUTO: 0.04 THOUSANDS/ΜL (ref 0–0.1)
BASOPHILS NFR BLD AUTO: 1 % (ref 0–1)
BILIRUB SERPL-MCNC: 0.7 MG/DL (ref 0.2–1)
BUN SERPL-MCNC: 18 MG/DL (ref 5–25)
CALCIUM SERPL-MCNC: 9.2 MG/DL (ref 8.3–10.1)
CHLORIDE SERPL-SCNC: 106 MMOL/L (ref 100–108)
CO2 SERPL-SCNC: 29 MMOL/L (ref 21–32)
CREAT SERPL-MCNC: 1.11 MG/DL (ref 0.6–1.3)
EOSINOPHIL # BLD AUTO: 0.06 THOUSAND/ΜL (ref 0–0.61)
EOSINOPHIL NFR BLD AUTO: 1 % (ref 0–6)
ERYTHROCYTE [DISTWIDTH] IN BLOOD BY AUTOMATED COUNT: 12.9 % (ref 11.6–15.1)
GFR SERPL CREATININE-BSD FRML MDRD: 77 ML/MIN/1.73SQ M
GLUCOSE SERPL-MCNC: 107 MG/DL (ref 65–140)
HCT VFR BLD AUTO: 48.8 % (ref 36.5–49.3)
HGB BLD-MCNC: 16 G/DL (ref 12–17)
IMM GRANULOCYTES # BLD AUTO: 0.01 THOUSAND/UL (ref 0–0.2)
IMM GRANULOCYTES NFR BLD AUTO: 0 % (ref 0–2)
INR PPP: 1.06 (ref 0.84–1.19)
LYMPHOCYTES # BLD AUTO: 2.21 THOUSANDS/ΜL (ref 0.6–4.47)
LYMPHOCYTES NFR BLD AUTO: 28 % (ref 14–44)
MAGNESIUM SERPL-MCNC: 2.1 MG/DL (ref 1.6–2.6)
MCH RBC QN AUTO: 31.7 PG (ref 26.8–34.3)
MCHC RBC AUTO-ENTMCNC: 32.8 G/DL (ref 31.4–37.4)
MCV RBC AUTO: 97 FL (ref 82–98)
MONOCYTES # BLD AUTO: 0.45 THOUSAND/ΜL (ref 0.17–1.22)
MONOCYTES NFR BLD AUTO: 6 % (ref 4–12)
NEUTROPHILS # BLD AUTO: 5.07 THOUSANDS/ΜL (ref 1.85–7.62)
NEUTS SEG NFR BLD AUTO: 64 % (ref 43–75)
NRBC BLD AUTO-RTO: 0 /100 WBCS
P AXIS: 55 DEGREES
PLATELET # BLD AUTO: 200 THOUSANDS/UL (ref 149–390)
PMV BLD AUTO: 10.5 FL (ref 8.9–12.7)
POTASSIUM SERPL-SCNC: 3.9 MMOL/L (ref 3.5–5.3)
PR INTERVAL: 152 MS
PROT SERPL-MCNC: 7.6 G/DL (ref 6.4–8.2)
PROTHROMBIN TIME: 13.8 SECONDS (ref 11.6–14.5)
QRS AXIS: 40 DEGREES
QRSD INTERVAL: 94 MS
QT INTERVAL: 420 MS
QTC INTERVAL: 420 MS
RBC # BLD AUTO: 5.04 MILLION/UL (ref 3.88–5.62)
SODIUM SERPL-SCNC: 142 MMOL/L (ref 136–145)
T WAVE AXIS: 42 DEGREES
TROPONIN I SERPL-MCNC: <0.02 NG/ML
VENTRICULAR RATE: 60 BPM
WBC # BLD AUTO: 7.84 THOUSAND/UL (ref 4.31–10.16)

## 2020-04-12 PROCEDURE — 83735 ASSAY OF MAGNESIUM: CPT | Performed by: PHYSICIAN ASSISTANT

## 2020-04-12 PROCEDURE — 36415 COLL VENOUS BLD VENIPUNCTURE: CPT | Performed by: PHYSICIAN ASSISTANT

## 2020-04-12 PROCEDURE — 85730 THROMBOPLASTIN TIME PARTIAL: CPT | Performed by: PHYSICIAN ASSISTANT

## 2020-04-12 PROCEDURE — 85025 COMPLETE CBC W/AUTO DIFF WBC: CPT | Performed by: PHYSICIAN ASSISTANT

## 2020-04-12 PROCEDURE — 93005 ELECTROCARDIOGRAM TRACING: CPT

## 2020-04-12 PROCEDURE — 99284 EMERGENCY DEPT VISIT MOD MDM: CPT | Performed by: PHYSICIAN ASSISTANT

## 2020-04-12 PROCEDURE — 85610 PROTHROMBIN TIME: CPT | Performed by: PHYSICIAN ASSISTANT

## 2020-04-12 PROCEDURE — 99285 EMERGENCY DEPT VISIT HI MDM: CPT

## 2020-04-12 PROCEDURE — 80053 COMPREHEN METABOLIC PANEL: CPT | Performed by: PHYSICIAN ASSISTANT

## 2020-04-12 PROCEDURE — 84484 ASSAY OF TROPONIN QUANT: CPT | Performed by: PHYSICIAN ASSISTANT

## 2020-04-12 PROCEDURE — 71045 X-RAY EXAM CHEST 1 VIEW: CPT

## 2020-04-12 PROCEDURE — 93010 ELECTROCARDIOGRAM REPORT: CPT | Performed by: INTERNAL MEDICINE

## 2020-06-23 ENCOUNTER — OFFICE VISIT (OUTPATIENT)
Dept: FAMILY MEDICINE CLINIC | Facility: CLINIC | Age: 50
End: 2020-06-23
Payer: COMMERCIAL

## 2020-06-23 VITALS
OXYGEN SATURATION: 97 % | HEIGHT: 73 IN | WEIGHT: 214.6 LBS | HEART RATE: 81 BPM | DIASTOLIC BLOOD PRESSURE: 82 MMHG | SYSTOLIC BLOOD PRESSURE: 136 MMHG | BODY MASS INDEX: 28.44 KG/M2 | TEMPERATURE: 97.9 F | RESPIRATION RATE: 18 BRPM

## 2020-06-23 DIAGNOSIS — R07.9 CHEST PAIN, UNSPECIFIED TYPE: Primary | ICD-10-CM

## 2020-06-23 DIAGNOSIS — I25.2 HISTORY OF NON-ST ELEVATION MYOCARDIAL INFARCTION (NSTEMI): ICD-10-CM

## 2020-06-23 DIAGNOSIS — E78.00 HYPERCHOLESTEROLEMIA: ICD-10-CM

## 2020-06-23 DIAGNOSIS — M54.6 CHRONIC BILATERAL THORACIC BACK PAIN: ICD-10-CM

## 2020-06-23 DIAGNOSIS — G89.29 CHRONIC BILATERAL THORACIC BACK PAIN: ICD-10-CM

## 2020-06-23 DIAGNOSIS — Z72.0 TOBACCO ABUSE: ICD-10-CM

## 2020-06-23 PROCEDURE — 99213 OFFICE O/P EST LOW 20 MIN: CPT | Performed by: FAMILY MEDICINE

## 2020-06-23 RX ORDER — NITROGLYCERIN 0.4 MG/1
0.4 TABLET SUBLINGUAL
Qty: 25 TABLET | Refills: 3 | Status: SHIPPED | OUTPATIENT
Start: 2020-06-23 | End: 2021-03-08 | Stop reason: SDUPTHER

## 2020-06-30 ENCOUNTER — HOSPITAL ENCOUNTER (OUTPATIENT)
Dept: RADIOLOGY | Facility: HOSPITAL | Age: 50
Discharge: HOME | End: 2020-06-30
Payer: COMMERCIAL

## 2020-06-30 ENCOUNTER — OFFICE VISIT (OUTPATIENT)
Dept: CARDIOLOGY CLINIC | Facility: CLINIC | Age: 50
End: 2020-06-30
Payer: COMMERCIAL

## 2020-06-30 VITALS
WEIGHT: 214 LBS | SYSTOLIC BLOOD PRESSURE: 102 MMHG | HEART RATE: 91 BPM | TEMPERATURE: 98.9 F | HEIGHT: 73 IN | BODY MASS INDEX: 28.36 KG/M2 | DIASTOLIC BLOOD PRESSURE: 78 MMHG

## 2020-06-30 DIAGNOSIS — M54.6 BILATERAL THORACIC BACK PAIN, UNSPECIFIED CHRONICITY: ICD-10-CM

## 2020-06-30 DIAGNOSIS — R73.03 PREDIABETES: ICD-10-CM

## 2020-06-30 DIAGNOSIS — I20.9 ANGINA PECTORIS (HCC): ICD-10-CM

## 2020-06-30 DIAGNOSIS — I25.2 HISTORY OF NON-ST ELEVATION MYOCARDIAL INFARCTION (NSTEMI): Primary | ICD-10-CM

## 2020-06-30 DIAGNOSIS — E66.3 OVERWEIGHT: ICD-10-CM

## 2020-06-30 DIAGNOSIS — E78.00 HYPERCHOLESTEROLEMIA: ICD-10-CM

## 2020-06-30 DIAGNOSIS — Z72.0 TOBACCO ABUSE: ICD-10-CM

## 2020-06-30 PROCEDURE — 3008F BODY MASS INDEX DOCD: CPT

## 2020-06-30 PROCEDURE — 99214 OFFICE O/P EST MOD 30 MIN: CPT

## 2020-06-30 PROCEDURE — 99406 BEHAV CHNG SMOKING 3-10 MIN: CPT

## 2020-06-30 PROCEDURE — 72072 X-RAY EXAM THORAC SPINE 3VWS: CPT

## 2020-07-02 DIAGNOSIS — M54.6 BILATERAL THORACIC BACK PAIN, UNSPECIFIED CHRONICITY: Primary | ICD-10-CM

## 2020-07-07 ENCOUNTER — TELEPHONE (OUTPATIENT)
Dept: PHYSICAL THERAPY | Facility: OTHER | Age: 50
End: 2020-07-07

## 2020-07-07 NOTE — TELEPHONE ENCOUNTER
Called patient per referral   Patient states he is unable to talk at this time but states he is interested in PT  Patient states he will call our program back tomorrow  This is the 1st attempt to reach the patient  Will defer per protocol

## 2020-07-09 ENCOUNTER — HOSPITAL ENCOUNTER (OUTPATIENT)
Dept: NUCLEAR MEDICINE | Facility: HOSPITAL | Age: 50
Discharge: HOME | End: 2020-07-09
Payer: COMMERCIAL

## 2020-07-09 DIAGNOSIS — I20.9 ANGINA PECTORIS (HCC): ICD-10-CM

## 2020-07-09 DIAGNOSIS — E78.00 HYPERCHOLESTEROLEMIA: ICD-10-CM

## 2020-07-09 DIAGNOSIS — E66.3 OVERWEIGHT: ICD-10-CM

## 2020-07-09 DIAGNOSIS — R73.03 PREDIABETES: ICD-10-CM

## 2020-07-09 DIAGNOSIS — Z72.0 TOBACCO ABUSE: ICD-10-CM

## 2020-07-09 DIAGNOSIS — I25.2 HISTORY OF NON-ST ELEVATION MYOCARDIAL INFARCTION (NSTEMI): ICD-10-CM

## 2020-07-09 LAB
CHEST PAIN STATEMENT: NORMAL
MAX DIASTOLIC BP: 84 MMHG
MAX HEART RATE: 141 BPM
MAX PREDICTED HEART RATE: 170 BPM
MAX. SYSTOLIC BP: 120 MMHG
PROTOCOL NAME: NORMAL
REASON FOR TERMINATION: NORMAL
TARGET HR FORMULA: NORMAL
TEST INDICATION: NORMAL
TIME IN EXERCISE PHASE: NORMAL

## 2020-07-09 PROCEDURE — 93018 CV STRESS TEST I&R ONLY: CPT | Performed by: INTERNAL MEDICINE

## 2020-07-09 PROCEDURE — A9502 TC99M TETROFOSMIN: HCPCS

## 2020-07-09 PROCEDURE — 93016 CV STRESS TEST SUPVJ ONLY: CPT | Performed by: INTERNAL MEDICINE

## 2020-07-09 PROCEDURE — 78452 HT MUSCLE IMAGE SPECT MULT: CPT | Performed by: INTERNAL MEDICINE

## 2020-07-09 PROCEDURE — 93017 CV STRESS TEST TRACING ONLY: CPT

## 2020-07-09 PROCEDURE — 78452 HT MUSCLE IMAGE SPECT MULT: CPT

## 2020-07-23 ENCOUNTER — TELEPHONE (OUTPATIENT)
Dept: PHYSICAL THERAPY | Facility: OTHER | Age: 50
End: 2020-07-23

## 2020-07-23 NOTE — TELEPHONE ENCOUNTER
Called per referral   Voice message left for patient to call back  Phone number and hours of business provided  This is the 2nd attempt to reach the patient  Referral closed

## 2020-08-17 DIAGNOSIS — M54.6 BILATERAL THORACIC BACK PAIN, UNSPECIFIED CHRONICITY: ICD-10-CM

## 2020-11-11 ENCOUNTER — HOSPITAL ENCOUNTER (EMERGENCY)
Facility: HOSPITAL | Age: 50
Discharge: HOME | End: 2020-11-11
Attending: EMERGENCY MEDICINE
Payer: COMMERCIAL

## 2020-11-11 ENCOUNTER — APPOINTMENT (EMERGENCY)
Dept: RADIOLOGY | Facility: HOSPITAL | Age: 50
End: 2020-11-11
Payer: COMMERCIAL

## 2020-11-11 VITALS
BODY MASS INDEX: 29.32 KG/M2 | WEIGHT: 222.22 LBS | DIASTOLIC BLOOD PRESSURE: 76 MMHG | RESPIRATION RATE: 18 BRPM | OXYGEN SATURATION: 96 % | HEART RATE: 52 BPM | TEMPERATURE: 98.2 F | SYSTOLIC BLOOD PRESSURE: 127 MMHG

## 2020-11-11 DIAGNOSIS — R07.9 CHEST PAIN: Primary | ICD-10-CM

## 2020-11-11 DIAGNOSIS — Z86.79 HISTORY OF CORONARY ARTERY DISEASE: ICD-10-CM

## 2020-11-11 DIAGNOSIS — M54.6 CHRONIC THORACIC BACK PAIN: ICD-10-CM

## 2020-11-11 DIAGNOSIS — G89.29 CHRONIC THORACIC BACK PAIN: ICD-10-CM

## 2020-11-11 LAB
ALBUMIN SERPL BCP-MCNC: 3.5 G/DL (ref 3.5–5)
ALP SERPL-CCNC: 117 U/L (ref 46–116)
ALT SERPL W P-5'-P-CCNC: 35 U/L (ref 12–78)
ANION GAP SERPL CALCULATED.3IONS-SCNC: 9 MMOL/L (ref 4–13)
AST SERPL W P-5'-P-CCNC: 21 U/L (ref 5–45)
BASOPHILS # BLD AUTO: 0.06 THOUSANDS/ΜL (ref 0–0.1)
BASOPHILS NFR BLD AUTO: 1 % (ref 0–1)
BILIRUB SERPL-MCNC: 0.4 MG/DL (ref 0.2–1)
BUN SERPL-MCNC: 15 MG/DL (ref 5–25)
CALCIUM SERPL-MCNC: 8.9 MG/DL (ref 8.3–10.1)
CHLORIDE SERPL-SCNC: 107 MMOL/L (ref 100–108)
CO2 SERPL-SCNC: 24 MMOL/L (ref 21–32)
CREAT SERPL-MCNC: 1.05 MG/DL (ref 0.6–1.3)
EOSINOPHIL # BLD AUTO: 0.15 THOUSAND/ΜL (ref 0–0.61)
EOSINOPHIL NFR BLD AUTO: 2 % (ref 0–6)
ERYTHROCYTE [DISTWIDTH] IN BLOOD BY AUTOMATED COUNT: 13.2 % (ref 11.6–15.1)
GFR SERPL CREATININE-BSD FRML MDRD: 82 ML/MIN/1.73SQ M
GLUCOSE SERPL-MCNC: 106 MG/DL (ref 65–140)
HCT VFR BLD AUTO: 48.6 % (ref 36.5–49.3)
HGB BLD-MCNC: 16.1 G/DL (ref 12–17)
HOLD SPECIMEN: NORMAL
IMM GRANULOCYTES # BLD AUTO: 0.02 THOUSAND/UL (ref 0–0.2)
IMM GRANULOCYTES NFR BLD AUTO: 0 % (ref 0–2)
LYMPHOCYTES # BLD AUTO: 3.19 THOUSANDS/ΜL (ref 0.6–4.47)
LYMPHOCYTES NFR BLD AUTO: 34 % (ref 14–44)
MCH RBC QN AUTO: 31.7 PG (ref 26.8–34.3)
MCHC RBC AUTO-ENTMCNC: 33.1 G/DL (ref 31.4–37.4)
MCV RBC AUTO: 96 FL (ref 82–98)
MONOCYTES # BLD AUTO: 0.66 THOUSAND/ΜL (ref 0.17–1.22)
MONOCYTES NFR BLD AUTO: 7 % (ref 4–12)
NEUTROPHILS # BLD AUTO: 5.26 THOUSANDS/ΜL (ref 1.85–7.62)
NEUTS SEG NFR BLD AUTO: 56 % (ref 43–75)
NRBC BLD AUTO-RTO: 0 /100 WBCS
PLATELET # BLD AUTO: 192 THOUSANDS/UL (ref 149–390)
PMV BLD AUTO: 10.2 FL (ref 8.9–12.7)
POTASSIUM SERPL-SCNC: 4 MMOL/L (ref 3.5–5.3)
PROT SERPL-MCNC: 7.2 G/DL (ref 6.4–8.2)
RBC # BLD AUTO: 5.08 MILLION/UL (ref 3.88–5.62)
SODIUM SERPL-SCNC: 140 MMOL/L (ref 136–145)
TROPONIN I SERPL-MCNC: <0.02 NG/ML
TROPONIN I SERPL-MCNC: <0.02 NG/ML
WBC # BLD AUTO: 9.34 THOUSAND/UL (ref 4.31–10.16)

## 2020-11-11 PROCEDURE — 71045 X-RAY EXAM CHEST 1 VIEW: CPT

## 2020-11-11 PROCEDURE — 99284 EMERGENCY DEPT VISIT MOD MDM: CPT | Performed by: EMERGENCY MEDICINE

## 2020-11-11 PROCEDURE — 85025 COMPLETE CBC W/AUTO DIFF WBC: CPT

## 2020-11-11 PROCEDURE — 84484 ASSAY OF TROPONIN QUANT: CPT | Performed by: EMERGENCY MEDICINE

## 2020-11-11 PROCEDURE — 36415 COLL VENOUS BLD VENIPUNCTURE: CPT

## 2020-11-11 PROCEDURE — 80053 COMPREHEN METABOLIC PANEL: CPT

## 2020-11-11 PROCEDURE — 99285 EMERGENCY DEPT VISIT HI MDM: CPT

## 2020-11-11 PROCEDURE — 84484 ASSAY OF TROPONIN QUANT: CPT

## 2020-11-11 PROCEDURE — 93005 ELECTROCARDIOGRAM TRACING: CPT

## 2020-11-11 RX ORDER — LIDOCAINE 50 MG/G
1 PATCH TOPICAL ONCE
Status: DISCONTINUED | OUTPATIENT
Start: 2020-11-11 | End: 2020-11-11 | Stop reason: HOSPADM

## 2020-11-11 RX ADMIN — LIDOCAINE 5% 1 PATCH: 700 PATCH TOPICAL at 19:50

## 2020-11-12 LAB
ATRIAL RATE: 46 BPM
ATRIAL RATE: 68 BPM
P AXIS: 39 DEGREES
P AXIS: 72 DEGREES
PR INTERVAL: 152 MS
PR INTERVAL: 154 MS
QRS AXIS: 17 DEGREES
QRS AXIS: 51 DEGREES
QRSD INTERVAL: 90 MS
QRSD INTERVAL: 96 MS
QT INTERVAL: 424 MS
QT INTERVAL: 482 MS
QTC INTERVAL: 421 MS
QTC INTERVAL: 450 MS
T WAVE AXIS: 15 DEGREES
T WAVE AXIS: 20 DEGREES
VENTRICULAR RATE: 46 BPM
VENTRICULAR RATE: 68 BPM

## 2020-11-12 PROCEDURE — 93010 ELECTROCARDIOGRAM REPORT: CPT | Performed by: INTERNAL MEDICINE

## 2020-11-19 ENCOUNTER — OFFICE VISIT (OUTPATIENT)
Dept: CARDIOLOGY CLINIC | Facility: CLINIC | Age: 50
End: 2020-11-19
Payer: COMMERCIAL

## 2020-11-19 VITALS
TEMPERATURE: 97.4 F | WEIGHT: 217.2 LBS | BODY MASS INDEX: 28.66 KG/M2 | HEART RATE: 66 BPM | SYSTOLIC BLOOD PRESSURE: 100 MMHG | DIASTOLIC BLOOD PRESSURE: 74 MMHG

## 2020-11-19 DIAGNOSIS — R07.89 OTHER CHEST PAIN: ICD-10-CM

## 2020-11-19 DIAGNOSIS — I25.2 HISTORY OF NON-ST ELEVATION MYOCARDIAL INFARCTION (NSTEMI): Primary | ICD-10-CM

## 2020-11-19 PROCEDURE — 4004F PT TOBACCO SCREEN RCVD TLK: CPT

## 2020-11-19 PROCEDURE — 99214 OFFICE O/P EST MOD 30 MIN: CPT

## 2020-12-07 ENCOUNTER — TELEPHONE (OUTPATIENT)
Dept: FAMILY MEDICINE CLINIC | Facility: CLINIC | Age: 50
End: 2020-12-07

## 2020-12-07 DIAGNOSIS — M54.6 BILATERAL THORACIC BACK PAIN, UNSPECIFIED CHRONICITY: Primary | ICD-10-CM

## 2021-01-24 ENCOUNTER — APPOINTMENT (EMERGENCY)
Dept: RADIOLOGY | Facility: HOSPITAL | Age: 51
End: 2021-01-24
Payer: COMMERCIAL

## 2021-01-24 ENCOUNTER — HOSPITAL ENCOUNTER (EMERGENCY)
Facility: HOSPITAL | Age: 51
Discharge: HOME | End: 2021-01-24
Attending: EMERGENCY MEDICINE
Payer: COMMERCIAL

## 2021-01-24 VITALS
TEMPERATURE: 98.2 F | BODY MASS INDEX: 27.59 KG/M2 | WEIGHT: 215 LBS | OXYGEN SATURATION: 97 % | HEIGHT: 74 IN | DIASTOLIC BLOOD PRESSURE: 67 MMHG | SYSTOLIC BLOOD PRESSURE: 113 MMHG | RESPIRATION RATE: 18 BRPM | HEART RATE: 51 BPM

## 2021-01-24 DIAGNOSIS — R07.9 CHEST PAIN, UNSPECIFIED TYPE: Primary | ICD-10-CM

## 2021-01-24 LAB
ALBUMIN SERPL BCP-MCNC: 3.9 G/DL (ref 3.5–5)
ALP SERPL-CCNC: 121 U/L (ref 46–116)
ALT SERPL W P-5'-P-CCNC: 42 U/L (ref 12–78)
ANION GAP SERPL CALCULATED.3IONS-SCNC: 8 MMOL/L (ref 4–13)
APTT PPP: 32 SECONDS (ref 23–37)
AST SERPL W P-5'-P-CCNC: 22 U/L (ref 5–45)
BASOPHILS # BLD AUTO: 0.07 THOUSANDS/ΜL (ref 0–0.1)
BASOPHILS NFR BLD AUTO: 1 % (ref 0–1)
BILIRUB SERPL-MCNC: 0.5 MG/DL (ref 0.2–1)
BUN SERPL-MCNC: 19 MG/DL (ref 5–25)
CALCIUM SERPL-MCNC: 8.9 MG/DL (ref 8.3–10.1)
CHLORIDE SERPL-SCNC: 106 MMOL/L (ref 100–108)
CO2 SERPL-SCNC: 25 MMOL/L (ref 21–32)
CREAT SERPL-MCNC: 1.26 MG/DL (ref 0.6–1.3)
EOSINOPHIL # BLD AUTO: 0.11 THOUSAND/ΜL (ref 0–0.61)
EOSINOPHIL NFR BLD AUTO: 1 % (ref 0–6)
ERYTHROCYTE [DISTWIDTH] IN BLOOD BY AUTOMATED COUNT: 12.9 % (ref 11.6–15.1)
GFR SERPL CREATININE-BSD FRML MDRD: 66 ML/MIN/1.73SQ M
GLUCOSE SERPL-MCNC: 92 MG/DL (ref 65–140)
HCT VFR BLD AUTO: 47.2 % (ref 36.5–49.3)
HGB BLD-MCNC: 15.7 G/DL (ref 12–17)
IMM GRANULOCYTES # BLD AUTO: 0.03 THOUSAND/UL (ref 0–0.2)
IMM GRANULOCYTES NFR BLD AUTO: 0 % (ref 0–2)
INR PPP: 1.05 (ref 0.84–1.19)
LIPASE SERPL-CCNC: 129 U/L (ref 73–393)
LYMPHOCYTES # BLD AUTO: 3.54 THOUSANDS/ΜL (ref 0.6–4.47)
LYMPHOCYTES NFR BLD AUTO: 38 % (ref 14–44)
MAGNESIUM SERPL-MCNC: 2.3 MG/DL (ref 1.6–2.6)
MCH RBC QN AUTO: 32.1 PG (ref 26.8–34.3)
MCHC RBC AUTO-ENTMCNC: 33.3 G/DL (ref 31.4–37.4)
MCV RBC AUTO: 97 FL (ref 82–98)
MONOCYTES # BLD AUTO: 0.75 THOUSAND/ΜL (ref 0.17–1.22)
MONOCYTES NFR BLD AUTO: 8 % (ref 4–12)
NEUTROPHILS # BLD AUTO: 4.92 THOUSANDS/ΜL (ref 1.85–7.62)
NEUTS SEG NFR BLD AUTO: 52 % (ref 43–75)
NRBC BLD AUTO-RTO: 0 /100 WBCS
NT-PROBNP SERPL-MCNC: 26 PG/ML
PLATELET # BLD AUTO: 204 THOUSANDS/UL (ref 149–390)
PMV BLD AUTO: 10.4 FL (ref 8.9–12.7)
POTASSIUM SERPL-SCNC: 3.8 MMOL/L (ref 3.5–5.3)
PROT SERPL-MCNC: 7.4 G/DL (ref 6.4–8.2)
PROTHROMBIN TIME: 13.5 SECONDS (ref 11.6–14.5)
RBC # BLD AUTO: 4.89 MILLION/UL (ref 3.88–5.62)
SODIUM SERPL-SCNC: 139 MMOL/L (ref 136–145)
TROPONIN I SERPL-MCNC: <0.02 NG/ML
WBC # BLD AUTO: 9.42 THOUSAND/UL (ref 4.31–10.16)

## 2021-01-24 PROCEDURE — 36415 COLL VENOUS BLD VENIPUNCTURE: CPT | Performed by: EMERGENCY MEDICINE

## 2021-01-24 PROCEDURE — 93005 ELECTROCARDIOGRAM TRACING: CPT

## 2021-01-24 PROCEDURE — 83880 ASSAY OF NATRIURETIC PEPTIDE: CPT | Performed by: EMERGENCY MEDICINE

## 2021-01-24 PROCEDURE — 71045 X-RAY EXAM CHEST 1 VIEW: CPT

## 2021-01-24 PROCEDURE — 84484 ASSAY OF TROPONIN QUANT: CPT | Performed by: EMERGENCY MEDICINE

## 2021-01-24 PROCEDURE — 85610 PROTHROMBIN TIME: CPT | Performed by: EMERGENCY MEDICINE

## 2021-01-24 PROCEDURE — 99285 EMERGENCY DEPT VISIT HI MDM: CPT

## 2021-01-24 PROCEDURE — 85730 THROMBOPLASTIN TIME PARTIAL: CPT | Performed by: EMERGENCY MEDICINE

## 2021-01-24 PROCEDURE — 99285 EMERGENCY DEPT VISIT HI MDM: CPT | Performed by: EMERGENCY MEDICINE

## 2021-01-24 PROCEDURE — 96374 THER/PROPH/DIAG INJ IV PUSH: CPT

## 2021-01-24 PROCEDURE — 96375 TX/PRO/DX INJ NEW DRUG ADDON: CPT

## 2021-01-24 PROCEDURE — 83735 ASSAY OF MAGNESIUM: CPT | Performed by: EMERGENCY MEDICINE

## 2021-01-24 PROCEDURE — 83690 ASSAY OF LIPASE: CPT | Performed by: EMERGENCY MEDICINE

## 2021-01-24 PROCEDURE — 80053 COMPREHEN METABOLIC PANEL: CPT | Performed by: EMERGENCY MEDICINE

## 2021-01-24 PROCEDURE — 85025 COMPLETE CBC W/AUTO DIFF WBC: CPT | Performed by: EMERGENCY MEDICINE

## 2021-01-24 RX ORDER — MORPHINE SULFATE 4 MG/ML
4 INJECTION, SOLUTION INTRAMUSCULAR; INTRAVENOUS ONCE
Status: COMPLETED | OUTPATIENT
Start: 2021-01-24 | End: 2021-01-24

## 2021-01-24 RX ORDER — NAPROXEN 500 MG/1
500 TABLET ORAL 2 TIMES DAILY WITH MEALS
Qty: 10 TABLET | Refills: 0 | Status: SHIPPED | OUTPATIENT
Start: 2021-01-24 | End: 2021-03-08 | Stop reason: SDUPTHER

## 2021-01-24 RX ORDER — TRAMADOL HYDROCHLORIDE 50 MG/1
50 TABLET ORAL EVERY 6 HOURS PRN
Qty: 12 TABLET | Refills: 0 | Status: SHIPPED | OUTPATIENT
Start: 2021-01-24 | End: 2021-06-14

## 2021-01-24 RX ORDER — SODIUM CHLORIDE 9 MG/ML
3 INJECTION INTRAVENOUS
Status: DISCONTINUED | OUTPATIENT
Start: 2021-01-24 | End: 2021-01-24 | Stop reason: HOSPADM

## 2021-01-24 RX ORDER — CYCLOBENZAPRINE HCL 10 MG
10 TABLET ORAL 3 TIMES DAILY PRN
Qty: 30 TABLET | Refills: 0 | Status: SHIPPED | OUTPATIENT
Start: 2021-01-24 | End: 2021-03-08 | Stop reason: SDUPTHER

## 2021-01-24 RX ORDER — ONDANSETRON 2 MG/ML
4 INJECTION INTRAMUSCULAR; INTRAVENOUS ONCE
Status: COMPLETED | OUTPATIENT
Start: 2021-01-24 | End: 2021-01-24

## 2021-01-24 RX ADMIN — ONDANSETRON 4 MG: 2 INJECTION INTRAMUSCULAR; INTRAVENOUS at 18:32

## 2021-01-24 RX ADMIN — MORPHINE SULFATE 4 MG: 4 INJECTION, SOLUTION INTRAMUSCULAR; INTRAVENOUS at 18:32

## 2021-01-24 NOTE — Clinical Note
Lemule Johns was seen and treated in our emergency department on 1/24/2021  Diagnosis:     Faith Gregg  may return to work on return date  He may return on this date: 01/26/2021         If you have any questions or concerns, please don't hesitate to call        Maryjo Hopkins, DO    ______________________________           _______________          _______________  Hospital Representative                              Date                                Time

## 2021-01-24 NOTE — ED PROVIDER NOTES
History  Chief Complaint   Patient presents with    Chest Pain     started friday on left side, left rib pain also  chronic back pain     HPI     Pt presents from home, hx of HTN, HLD, CAD, c/o left sided chest pain, constant for 2 days, worse w/ lifting items with the left arm or abducting the left arm, "sharp," mild to moderate intensity and currently present  Pt states that he was working all day today, and when he came home tonight, he was feeling worse  Pt also states that his thoracic back pain is hurting as well  Pt denies ha, fevers, cough, sob, n/v/d/c, abd pain, dysuria, focal def or syncope  Prior to Admission Medications   Prescriptions Last Dose Informant Patient Reported? Taking?    Blood Pressure Monitoring KIT  Spouse/Significant Other No No   Sig: by Does not apply route 2 (two) times a day   Diclofenac Sodium (VOLTAREN) 1 %   No No   Sig: Apply 2 g topically 4 (four) times a day   aspirin 81 mg chewable tablet Past Week at Unknown time Spouse/Significant Other No Yes   Sig: Chew 1 tablet (81 mg total) daily   atorvastatin (LIPITOR) 80 mg tablet  Spouse/Significant Other No No   Sig: Take 1 tablet (80 mg total) by mouth daily with dinner   clopidogrel (PLAVIX) 75 mg tablet Not Taking at Unknown time Spouse/Significant Other No No   Sig: Take 1 tablet (75 mg total) by mouth daily   Patient not taking: Reported on 1/24/2021   metoprolol succinate (TOPROL-XL) 25 mg 24 hr tablet  Spouse/Significant Other No No   Sig: Take 1 tablet (25 mg total) by mouth daily   nitroglycerin (NITROSTAT) 0 4 mg SL tablet  Spouse/Significant Other No No   Sig: Place 1 tablet (0 4 mg total) under the tongue every 5 (five) minutes as needed for chest pain      Facility-Administered Medications: None       Past Medical History:   Diagnosis Date    Hyperlipidemia     Hypertension     MI (myocardial infarction) ([facility] [address] )        Past Surgical History:   Procedure Laterality Date    CORONARY ANGIOPLASTY WITH STENT PLACEMENT      HAND SURGERY         History reviewed  No pertinent family history  I have reviewed and agree with the history as documented  E-Cigarette/Vaping    E-Cigarette Use Never User      E-Cigarette/Vaping Substances    Nicotine No     THC No     CBD No     Flavoring No     Other No     Unknown No      Social History     Tobacco Use    Smoking status: Current Every Day Smoker     Packs/day: 1 00     Types: Cigarettes    Smokeless tobacco: Never Used   Substance Use Topics    Alcohol use: Yes     Comment: occasional    Drug use: Not Currently       Review of Systems   Constitutional: Negative for activity change, appetite change and fever  HENT: Negative for congestion, nosebleeds and sore throat  Eyes: Negative for photophobia and discharge  Respiratory: Negative for cough, shortness of breath, wheezing and stridor  Cardiovascular: Positive for chest pain  Gastrointestinal: Negative for abdominal pain, constipation, diarrhea, nausea and vomiting  Endocrine: Negative for cold intolerance and polydipsia  Genitourinary: Negative for discharge, hematuria and penile pain  Musculoskeletal: Positive for back pain  Negative for arthralgias, myalgias and neck stiffness  Skin: Negative for color change and rash  Allergic/Immunologic: Negative for immunocompromised state  Neurological: Negative for dizziness, seizures and headaches  Hematological: Negative for adenopathy  Psychiatric/Behavioral: Negative for confusion and self-injury  The patient is not nervous/anxious  Physical Exam  Physical Exam  Vitals signs and nursing note reviewed  Constitutional:       General: He is not in acute distress  Appearance: He is well-developed  He is not diaphoretic  HENT:      Head: Normocephalic and atraumatic  Right Ear: External ear normal       Left Ear: External ear normal       Nose: Nose normal       Mouth/Throat:      Pharynx: No oropharyngeal exudate     Eyes: General: No scleral icterus  Right eye: No discharge  Left eye: No discharge  Conjunctiva/sclera: Conjunctivae normal       Pupils: Pupils are equal, round, and reactive to light  Neck:      Musculoskeletal: Normal range of motion and neck supple  Thyroid: No thyromegaly  Vascular: No JVD  Trachea: No tracheal deviation  Cardiovascular:      Rate and Rhythm: Normal rate and regular rhythm  Heart sounds: Normal heart sounds  No murmur  No friction rub  No gallop  Pulmonary:      Effort: No respiratory distress  Breath sounds: Normal breath sounds  No stridor  No wheezing or rales  Chest:      Chest wall: No tenderness  Abdominal:      General: Bowel sounds are normal  There is no distension  Palpations: Abdomen is soft  There is no mass  Tenderness: There is no abdominal tenderness  There is no guarding or rebound  Musculoskeletal: Normal range of motion  General: Tenderness present  No deformity  Arms:    Lymphadenopathy:      Cervical: No cervical adenopathy  Skin:     General: Skin is warm and dry  Coloration: Skin is not pale  Findings: No erythema or rash  Neurological:      Mental Status: He is alert and oriented to person, place, and time  Cranial Nerves: No cranial nerve deficit  Motor: No abnormal muscle tone  Coordination: Coordination normal       Deep Tendon Reflexes: Reflexes are normal and symmetric  Reflexes normal    Psychiatric:         Behavior: Behavior normal          Thought Content:  Thought content normal          Judgment: Judgment normal          Vital Signs  ED Triage Vitals   Temperature Pulse Respirations Blood Pressure SpO2   01/24/21 1727 01/24/21 1727 01/24/21 1727 01/24/21 1727 01/24/21 1727   98 2 °F (36 8 °C) 85 18 111/68 98 %      Temp Source Heart Rate Source Patient Position - Orthostatic VS BP Location FiO2 (%)   01/24/21 1727 01/24/21 1727 01/24/21 1727 01/24/21 1727 --   Temporal Monitor Sitting Right arm       Pain Score       01/24/21 1832       8           Vitals:    01/24/21 1727 01/24/21 1730 01/24/21 1800 01/24/21 1830   BP: 111/68 123/73 110/78 113/67   Pulse: 85 59 (!) 54 (!) 51   Patient Position - Orthostatic VS: Sitting Sitting Sitting Sitting         Visual Acuity      ED Medications  Medications   ondansetron (ZOFRAN) injection 4 mg (4 mg Intravenous Given 1/24/21 1832)   morphine (PF) 4 mg/mL injection 4 mg (4 mg Intravenous Given 1/24/21 1832)       Diagnostic Studies  Results Reviewed     Procedure Component Value Units Date/Time    NT-BNP PRO [[id number]]  (Normal) Collected: 01/24/21 1742    Lab Status: Final result Specimen: Blood from Arm, Right Updated: 01/24/21 1819     NT-proBNP 26 pg/mL     Lipase [[id number]]  (Normal) Collected: 01/24/21 1742    Lab Status: Final result Specimen: Blood from Arm, Right Updated: 01/24/21 1819     Lipase 129 u/L     Magnesium [[id number]]  (Normal) Collected: 01/24/21 1742    Lab Status: Final result Specimen: Blood from Arm, Right Updated: 01/24/21 1819     Magnesium 2 3 mg/dL     Troponin I [[id number]]  (Normal) Collected: 01/24/21 1742    Lab Status: Final result Specimen: Blood from Arm, Right Updated: 01/24/21 1816     Troponin I <0 02 ng/mL     Comprehensive metabolic panel [[id number]]  (Abnormal) Collected: 01/24/21 1742    Lab Status: Final result Specimen: Blood from Arm, Right Updated: 01/24/21 1814     Sodium 139 mmol/L      Potassium 3 8 mmol/L      Chloride 106 mmol/L      CO2 25 mmol/L      ANION GAP 8 mmol/L      BUN 19 mg/dL      Creatinine 1 26 mg/dL      Glucose 92 mg/dL      Calcium 8 9 mg/dL      AST 22 U/L      ALT 42 U/L      Alkaline Phosphatase 121 U/L      Total Protein 7 4 g/dL      Albumin 3 9 g/dL      Total Bilirubin 0 50 mg/dL      eGFR 66 ml/min/1 73sq m     Narrative:      [place] guidelines for Chronic Kidney Disease (CKD):     Stage 1 with normal or high GFR (GFR > 90 mL/min/1 73 square meters)    Stage 2 Mild CKD (GFR = 60-89 mL/min/1 73 square meters)    Stage 3A Moderate CKD (GFR = 45-59 mL/min/1 73 square meters)    Stage 3B Moderate CKD (GFR = 30-44 mL/min/1 73 square meters)    Stage 4 Severe CKD (GFR = 15-29 mL/min/1 73 square meters)    Stage 5 End Stage CKD (GFR <15 mL/min/1 73 square meters)  Note: GFR calculation is accurate only with a steady state creatinine    Protime-INR [[id number]]  (Normal) Collected: 01/24/21 1742    Lab Status: Final result Specimen: Blood from Arm, Right Updated: 01/24/21 1807     Protime 13 5 seconds      INR 1 05    APTT [[id number]]  (Normal) Collected: 01/24/21 1742    Lab Status: Final result Specimen: Blood from Arm, Right Updated: 01/24/21 1807     PTT 32 seconds     CBC and differential [[id number]] Collected: 01/24/21 1742    Lab Status: Final result Specimen: Blood from Arm, Right Updated: 01/24/21 1750     WBC 9 42 Thousand/uL      RBC 4 89 Million/uL      Hemoglobin 15 7 g/dL      Hematocrit 47 2 %      MCV 97 fL      MCH 32 1 pg      MCHC 33 3 g/dL      RDW 12 9 %      MPV 10 4 fL      Platelets 665 Thousands/uL      nRBC 0 /100 WBCs      Neutrophils Relative 52 %      Immat GRANS % 0 %      Lymphocytes Relative 38 %      Monocytes Relative 8 %      Eosinophils Relative 1 %      Basophils Relative 1 %      Neutrophils Absolute 4 92 Thousands/µL      Immature Grans Absolute 0 03 Thousand/uL      Lymphocytes Absolute 3 54 Thousands/µL      Monocytes Absolute 0 75 Thousand/µL      Eosinophils Absolute 0 11 Thousand/µL      Basophils Absolute 0 07 Thousands/µL              EKG: sinus jf, rate 57, no acute ischemia    X-ray chest 1 view portable   Final Result by Venus Huang MD (01/24 1808)      No acute cardiopulmonary disease  Workstation performed: [id number]                    Procedures  Procedures         ED Course       6:27 PM - Pt is improved from prior  He has had no further cp    He will f/up w/ his pcp  HEART Risk Score      Most Recent Value   Heart Score Risk Calculator   History  0 Filed at: 01/24/2021 1828   ECG  0 Filed at: 01/24/2021 1828   Age  1 Filed at: 01/24/2021 1828   Risk Factors  2 Filed at: 01/24/2021 1828   Troponin  0 Filed at: 01/24/2021 1828   HEART Score  3 Filed at: 01/24/2021 1828                      SBIRT 20yo+      Most Recent Value   SBIRT (25 yo +)   In order to provide better care to our patients, we are screening all of our patients for alcohol and drug use  Would it be okay to ask you these screening questions? Yes Filed at: 01/24/2021 1728   Initial Alcohol Screen: US AUDIT-C    1  How often do you have a drink containing alcohol?  0 Filed at: 01/24/2021 1728   2  How many drinks containing alcohol do you have on a typical day you are drinking? 0 Filed at: 01/24/2021 1728   3a  Male UNDER 65: How often do you have five or more drinks on one occasion? 0 Filed at: 01/24/2021 1728   3b  FEMALE Any Age, or MALE 65+: How often do you have 4 or more drinks on one occassion? 0 Filed at: 01/24/2021 1728   Audit-C Score  0 Filed at: 01/24/2021 1728   ADRYAN: How many times in the past year have you    Used an illegal drug or used a prescription medication for non-medical reasons? Never Filed at: 01/24/2021 1728                    MDM  Number of Diagnoses or Management Options  Diagnosis management comments: IMP: musc chest wall pain versus GERD, anxiety, viral syndrome  Doubt acs, pe, dissection, tamponade, cva, bacteremia, surgical abd process  Plan: cardiac labs, ekg, cxr, give ivf and iv narcotic pain meds prn   - ekg no acute ischemia  - labs no acute  - cxr no acute  - Pt with likely musc pain now improved  Pt will f/up w/ his pcp           Amount and/or Complexity of Data Reviewed  Clinical lab tests: ordered and reviewed  Tests in the radiology section of CPT®: ordered and reviewed  Tests in the medicine section of CPT®: ordered and reviewed  Decide to obtain previous medical records or to obtain history from someone other than the patient: yes  Obtain history from someone other than the patient: yes (Pt's wife)  Review and summarize past medical records: yes  Independent visualization of images, tracings, or specimens: yes    Risk of Complications, Morbidity, and/or Mortality  Presenting problems: high  Diagnostic procedures: high  Management options: high    Patient Progress  Patient progress: improved      Disposition  Final diagnoses:   Chest pain, unspecified type     Time reflects when diagnosis was documented in both MDM as applicable and the Disposition within this note     Time User Action Codes Description Comment    1/24/2021  6:28 PM Julito Person Add [R07 9] Chest pain, unspecified type       ED Disposition     ED Disposition Condition Date/Time Comment    Discharge Stable Sun Jan 24, 2021  6:28 PM Aneta Tierney discharge to home/self care  Follow-up Information     Follow up With Specialties Details Why Iván Quinones PA-C Family Medicine Schedule an appointment as soon as possible for a visit in 1 day As needed    Return immediately, If symptoms worsen [address] [place] [address]  [phone]            Discharge Medication List as of 1/24/2021  6:30 PM      START taking these medications    Details   cyclobenzaprine (FLEXERIL) 10 mg tablet Take 1 tablet (10 mg total) by mouth 3 (three) times a day as needed for muscle spasms, Starting Sun 1/24/2021, Until Tue 2/23/2021, Normal      naproxen (NAPROSYN) 500 mg tablet Take 1 tablet (500 mg total) by mouth 2 (two) times a day with meals for 10 doses, Starting Sun 1/24/2021, Until Fri 1/29/2021, Normal      traMADol (ULTRAM) 50 mg tablet Take 1 tablet (50 mg total) by mouth every 6 (six) hours as needed for moderate pain for up to 12 doses, Starting Sun 1/24/2021, Normal         CONTINUE these medications which have NOT CHANGED    Details   aspirin 81 mg chewable tablet Chew 1 tablet (81 mg total) daily, Starting Fri 4/10/2020, Normal      atorvastatin (LIPITOR) 80 mg tablet Take 1 tablet (80 mg total) by mouth daily with dinner, Starting Fri 4/10/2020, Normal      Blood Pressure Monitoring KIT by Does not apply route 2 (two) times a day, Starting Tue 6/23/2020, Normal      clopidogrel (PLAVIX) 75 mg tablet Take 1 tablet (75 mg total) by mouth daily, Starting Fri 4/10/2020, Normal      Diclofenac Sodium (VOLTAREN) 1 % Apply 2 g topically 4 (four) times a day, Starting Mon 12/7/2020, Normal      metoprolol succinate (TOPROL-XL) 25 mg 24 hr tablet Take 1 tablet (25 mg total) by mouth daily, Starting Fri 4/10/2020, Normal      nitroglycerin (NITROSTAT) 0 4 mg SL tablet Place 1 tablet (0 4 mg total) under the tongue every 5 (five) minutes as needed for chest pain, Starting Tue 6/23/2020, Normal           No discharge procedures on file      PDMP Review     None          ED Provider  Electronically Signed by           Beritn Sibley DO  01/29/21 1475

## 2021-01-25 LAB
ATRIAL RATE: 57 BPM
ATRIAL RATE: 63 BPM
P AXIS: 51 DEGREES
P AXIS: 55 DEGREES
PR INTERVAL: 148 MS
PR INTERVAL: 152 MS
QRS AXIS: 30 DEGREES
QRS AXIS: 31 DEGREES
QRSD INTERVAL: 90 MS
QRSD INTERVAL: 92 MS
QT INTERVAL: 446 MS
QT INTERVAL: 450 MS
QTC INTERVAL: 438 MS
QTC INTERVAL: 456 MS
T WAVE AXIS: 18 DEGREES
T WAVE AXIS: 24 DEGREES
VENTRICULAR RATE: 57 BPM
VENTRICULAR RATE: 63 BPM

## 2021-01-25 PROCEDURE — 93010 ELECTROCARDIOGRAM REPORT: CPT | Performed by: INTERNAL MEDICINE

## 2021-03-03 ENCOUNTER — TELEPHONE (OUTPATIENT)
Dept: CARDIOLOGY CLINIC | Facility: CLINIC | Age: 51
End: 2021-03-03

## 2021-03-03 NOTE — TELEPHONE ENCOUNTER
Pts wife called stating patient is having chest discomfort down side of chest was sen in ED in [place] for same type discomfort was given Tramadol, flexeril and naproxen  Has ran out of meds and wants refills called in   suggested calling PCP to fill this type of meds  Or report to ED if pain is too much  Wife states PCP not in and did not return call was told to call here  Explained that if chest pain to go to ED we would not just refill medications like that with out seeing incase there is a cardiac component to whats occurring    She understands HUMBERTO

## 2021-03-04 ENCOUNTER — HOSPITAL ENCOUNTER (EMERGENCY)
Facility: HOSPITAL | Age: 51
Discharge: HOME | End: 2021-03-04
Attending: EMERGENCY MEDICINE | Admitting: EMERGENCY MEDICINE
Payer: COMMERCIAL

## 2021-03-04 ENCOUNTER — APPOINTMENT (EMERGENCY)
Dept: RADIOLOGY | Facility: HOSPITAL | Age: 51
End: 2021-03-04
Payer: COMMERCIAL

## 2021-03-04 VITALS
TEMPERATURE: 97.6 F | OXYGEN SATURATION: 99 % | DIASTOLIC BLOOD PRESSURE: 58 MMHG | WEIGHT: 220.9 LBS | BODY MASS INDEX: 28.36 KG/M2 | HEART RATE: 78 BPM | SYSTOLIC BLOOD PRESSURE: 111 MMHG | RESPIRATION RATE: 18 BRPM

## 2021-03-04 DIAGNOSIS — S22.32XA LEFT RIB FRACTURE: Primary | ICD-10-CM

## 2021-03-04 PROCEDURE — 71101 X-RAY EXAM UNILAT RIBS/CHEST: CPT

## 2021-03-04 PROCEDURE — 99284 EMERGENCY DEPT VISIT MOD MDM: CPT | Performed by: PHYSICIAN ASSISTANT

## 2021-03-04 PROCEDURE — 99283 EMERGENCY DEPT VISIT LOW MDM: CPT

## 2021-03-04 RX ORDER — CYCLOBENZAPRINE HCL 10 MG
10 TABLET ORAL 2 TIMES DAILY PRN
Qty: 20 TABLET | Refills: 0 | Status: SHIPPED | OUTPATIENT
Start: 2021-03-04 | End: 2021-03-22 | Stop reason: SDUPTHER

## 2021-03-04 NOTE — Clinical Note
Marilia San was seen and treated in our emergency department on 3/4/2021  Diagnosis: L rib Fx    Harjeet  may return to work on return date  He may return on this date: 03/08/2021         If you have any questions or concerns, please don't hesitate to call        David Willams PA-C    ______________________________           _______________          _______________  Hospital Representative                              Date                                Time

## 2021-03-04 NOTE — ED PROVIDER NOTES
History  Chief Complaint   Patient presents with    Shoulder Pain     left side going into back and chest area     Pt presents to emergency department with his wife for evaluation of left rib and left shoulder pain  Pt states his pain started on Tuesday and has not improved  Pt states his pain starts around the left nipple and extends into the left shoulder  Pt was seen a couple weeks ago, cardiac work up was negative and pt was discharged on cyclobenzaprine  Pt states he had relief with taking the muscle relaxer however he ran out of pills  Pt has also been taking extra strength tylenol for pain  Pt states his pain is worse with movement denies any exacerbation of symptoms with any exertion  Pt denies any chest pain or shortness of breath at rest  Pt states he felt hot yesterday but denies chills or night sweats  Pain is worse with palpation  Patient offered cardiac workup however states not believe this is heart, he did have a negative workup about a month ago  Potentially noted exacerbation of symptoms after shoveling snow  Pt presents to emergency department with his wife for evaluation of left rib and left shoulder pain  Pt states his pain started on Tuesday and has not improved  Pt states his pain starts around the left nipple and extends into the left shoulder  Pt was seen a couple weeks ago, cardiac work up was negative and pt was discharged on cyclobenzaprine  Pt states he had relief with taking the muscle relaxer however he ran out of pills  Pt has also been taking extra strength tylenol for pain  Pt states his pain is worse with movement denies any exacerbation of symptoms with any exertion  Pt denies any chest pain or shortness of breath at rest  Pt states he felt hot yesterday but denies chills or night sweats  Pain is worse with palpation  Patient offered cardiac workup however states not believe this is heart, he did have a negative workup about a month ago      Potentially noted exacerbation of symptoms after shoveling snow  Prior to Admission Medications   Prescriptions Last Dose Informant Patient Reported? Taking?    Blood Pressure Monitoring KIT  Spouse/Significant Other No No   Sig: by Does not apply route 2 (two) times a day   Diclofenac Sodium (VOLTAREN) 1 %   No No   Sig: Apply 2 g topically 4 (four) times a day   aspirin 81 mg chewable tablet 3/4/2021 at Unknown time Spouse/Significant Other No Yes   Sig: Chew 1 tablet (81 mg total) daily   atorvastatin (LIPITOR) 80 mg tablet 3/3/2021 at Unknown time Spouse/Significant Other No Yes   Sig: Take 1 tablet (80 mg total) by mouth daily with dinner   clopidogrel (PLAVIX) 75 mg tablet Not Taking at Unknown time Spouse/Significant Other No No   Sig: Take 1 tablet (75 mg total) by mouth daily   Patient not taking: Reported on 1/24/2021   cyclobenzaprine (FLEXERIL) 10 mg tablet 3/4/2021 at Unknown time  No Yes   Sig: Take 1 tablet (10 mg total) by mouth 3 (three) times a day as needed for muscle spasms   metoprolol succinate (TOPROL-XL) 25 mg 24 hr tablet 3/4/2021 at Unknown time Spouse/Significant Other No Yes   Sig: Take 1 tablet (25 mg total) by mouth daily   naproxen (NAPROSYN) 500 mg tablet 3/1/2021  No Yes   Sig: Take 1 tablet (500 mg total) by mouth 2 (two) times a day with meals for 10 doses   nitroglycerin (NITROSTAT) 0 4 mg SL tablet  at prn Spouse/Significant Other No Yes   Sig: Place 1 tablet (0 4 mg total) under the tongue every 5 (five) minutes as needed for chest pain   traMADol (ULTRAM) 50 mg tablet 3/1/2021  No Yes   Sig: Take 1 tablet (50 mg total) by mouth every 6 (six) hours as needed for moderate pain for up to 12 doses      Facility-Administered Medications: None       Past Medical History:   Diagnosis Date    Hyperlipidemia     Hypertension     MI [facility]myocardial infarction) ([facility] [address] )        Past Surgical History:   Procedure Laterality Date    CORONARY ANGIOPLASTY WITH STENT PLACEMENT      HAND SURGERY         History reviewed  No pertinent family history  I have reviewed and agree with the history as documented  E-Cigarette/Vaping    E-Cigarette Use Never User      E-Cigarette/Vaping Substances    Nicotine No     THC No     CBD No     Flavoring No     Other No     Unknown No      Social History     Tobacco Use    Smoking status: Current Every Day Smoker     Packs/day: 1 00     Types: Cigarettes    Smokeless tobacco: Never Used   Substance Use Topics    Alcohol use: Yes     Comment: occasional    Drug use: Not Currently       Review of Systems   Constitutional: Negative  Negative for activity change, appetite change, chills, diaphoresis, fatigue, fever and unexpected weight change  HENT: Negative  Negative for sore throat, trouble swallowing and voice change  Eyes: Negative  Respiratory: Negative  Negative for cough, chest tightness, shortness of breath and wheezing  Cardiovascular: Negative for palpitations and leg swelling  Gastrointestinal: Negative  Negative for abdominal pain, blood in stool, nausea and vomiting  Endocrine: Negative  Genitourinary: Negative  Negative for flank pain and hematuria  Musculoskeletal: Negative  Negative for arthralgias, back pain, gait problem, joint swelling, myalgias, neck pain and neck stiffness  L rib pain   Skin: Negative  Negative for rash and wound  Allergic/Immunologic: Negative  Neurological: Negative  Negative for dizziness, seizures, syncope, weakness, light-headedness and headaches  Hematological: Negative  Psychiatric/Behavioral: Negative  All other systems reviewed and are negative  Physical Exam  Physical Exam  Vitals signs and nursing note reviewed  Constitutional:       General: He is not in acute distress  Appearance: He is well-developed and normal weight  He is not ill-appearing, toxic-appearing or diaphoretic  HENT:      Head: Normocephalic and atraumatic     Eyes:      Conjunctiva/sclera: Conjunctivae normal    Neck:      Musculoskeletal: Neck supple  Cardiovascular:      Rate and Rhythm: Normal rate and regular rhythm  Heart sounds: No murmur  Pulmonary:      Effort: Pulmonary effort is normal  No respiratory distress  Breath sounds: Normal breath sounds  Abdominal:      Palpations: Abdomen is soft  Tenderness: There is no abdominal tenderness  Musculoskeletal:         General: Tenderness present  No swelling, deformity or signs of injury  Arms:       Right lower leg: No edema  Left lower leg: No edema  Comments: Pain with range of motion  Skin:     General: Skin is warm and dry  Neurological:      General: No focal deficit present  Mental Status: He is alert  Psychiatric:         Mood and Affect: Mood normal          Vital Signs  ED Triage Vitals [03/04/21 0854]   Temperature Pulse Respirations Blood Pressure SpO2   97 6 °F (36 4 °C) 78 18 111/58 99 %      Temp Source Heart Rate Source Patient Position - Orthostatic VS BP Location FiO2 (%)   Temporal Monitor Lying Left arm --      Pain Score       Worst Possible Pain           Vitals:    03/04/21 0854   BP: 111/58   Pulse: 78   Patient Position - Orthostatic VS: Lying         Visual Acuity      ED Medications  Medications - No data to display    Diagnostic Studies  Results Reviewed     None                 XR ribs with pa chest min 3 views LEFT   ED Interpretation by Kunal Abel PA-C (03/04 0958)   No evidence of pulmonary process  No pulmonary contusion pneumothorax or consolidative process  No pleural effusions  There does appear to be fracture possibly mildly displaced of unknown chronicity noted primarily on the left posterior views  This region was partially cut off on chest x-ray from about a month and a half ago however appears to be partially visualized there as well                   Procedures  Procedures         ED Course  ED Course as of Mar 04 1001   Thu Mar 04, 2021   0904 Blood Pressure: 111/58   0904 Temperature: 97 6 °F (36 4 °C)   0904 Pulse: 78   0904 Respirations: 18   0904 SpO2: 99 %   0937 Pt at Xray                                SBIRT 22yo+      Most Recent Value   SBIRT (23 yo +)   In order to provide better care to our patients, we are screening all of our patients for alcohol and drug use  Would it be okay to ask you these screening questions? Yes Filed at: 03/04/2021 0857   Initial Alcohol Screen: US AUDIT-C    1  How often do you have a drink containing alcohol?  0 Filed at: 03/04/2021 0857   2  How many drinks containing alcohol do you have on a typical day you are drinking? 0 Filed at: 03/04/2021 0857   3a  Male UNDER 65: How often do you have five or more drinks on one occasion? 0 Filed at: 03/04/2021 0857   3b  FEMALE Any Age, or MALE 65+: How often do you have 4 or more drinks on one occassion? 0 Filed at: 03/04/2021 0857   Audit-C Score  0 Filed at: 03/04/2021 6373   ADRYAN: How many times in the past year have you    Used an illegal drug or used a prescription medication for non-medical reasons? Never Filed at: 03/04/2021 0618                    MDM    Disposition  Final diagnoses:   Left rib fracture     Time reflects when diagnosis was documented in both MDM as applicable and the Disposition within this note     Time User Action Codes Description Comment    3/4/2021  9:58 AM Negar Joaquin Left rib fracture       ED Disposition     ED Disposition Condition Date/Time Comment    Discharge Stable Thu Mar 4, 2021  9:58 AM Bruce Parikh discharge to home/self care              Follow-up Information     Follow up With Specialties Details Why Contact Info Additional [address] [facility] Schedule an appointment as soon as possible for a visit   [address] [facility] [address]  [address] [facility], [address] [facility], [facility], [address] [place] [address], [address] [phone]          Patient's Medications   Discharge Prescriptions    CYCLOBENZAPRINE (FLEXERIL) 10 MG TABLET    Take 1 tablet (10 mg total) by mouth 2 (two) times a day as needed for muscle spasms       Start Date: 3/4/2021  End Date: --       Order Dose: 10 mg       Quantity: 20 tablet    Refills: 0     No discharge procedures on file      PDMP Review     None          ED Provider  Electronically Signed by           Giselle Gil PA-C  03/04/21 7945

## 2021-03-08 ENCOUNTER — OFFICE VISIT (OUTPATIENT)
Dept: FAMILY MEDICINE CLINIC | Facility: CLINIC | Age: 51
End: 2021-03-08
Payer: COMMERCIAL

## 2021-03-08 VITALS
SYSTOLIC BLOOD PRESSURE: 128 MMHG | BODY MASS INDEX: 29.29 KG/M2 | WEIGHT: 221 LBS | TEMPERATURE: 97.8 F | HEART RATE: 80 BPM | HEIGHT: 73 IN | DIASTOLIC BLOOD PRESSURE: 84 MMHG | OXYGEN SATURATION: 99 % | RESPIRATION RATE: 18 BRPM

## 2021-03-08 DIAGNOSIS — R07.81 RIB PAIN ON LEFT SIDE: Primary | ICD-10-CM

## 2021-03-08 DIAGNOSIS — M54.6 BILATERAL THORACIC BACK PAIN, UNSPECIFIED CHRONICITY: ICD-10-CM

## 2021-03-08 DIAGNOSIS — R07.9 CHEST PAIN, UNSPECIFIED TYPE: ICD-10-CM

## 2021-03-08 DIAGNOSIS — I25.2 HISTORY OF NON-ST ELEVATION MYOCARDIAL INFARCTION (NSTEMI): ICD-10-CM

## 2021-03-08 DIAGNOSIS — Z12.11 SCREENING FOR COLON CANCER: ICD-10-CM

## 2021-03-08 DIAGNOSIS — Z23 ENCOUNTER FOR IMMUNIZATION: ICD-10-CM

## 2021-03-08 PROCEDURE — 99213 OFFICE O/P EST LOW 20 MIN: CPT | Performed by: FAMILY MEDICINE

## 2021-03-08 RX ORDER — NITROGLYCERIN 0.4 MG/1
0.4 TABLET SUBLINGUAL
Qty: 25 TABLET | Refills: 3 | Status: SHIPPED | OUTPATIENT
Start: 2021-03-08 | End: 2021-08-04 | Stop reason: SDUPTHER

## 2021-03-08 RX ORDER — NAPROXEN 500 MG/1
500 TABLET ORAL 2 TIMES DAILY WITH MEALS
Qty: 60 TABLET | Refills: 5 | OUTPATIENT
Start: 2021-03-08 | End: 2021-11-04

## 2021-03-08 NOTE — LETTER
March 8, 2021     Patient: Lemuel Johns   YOB: 1970   Date of Visit: 3/8/2021       To Whom it May Concern:    Lemuel Johns is under my professional care  He was seen in my office on 3/8/2021  He may return to work on 8/15/2021  If you have any questions or concerns, please don't hesitate to call           Sincerely,          Barbara Neely PA-C        CC: No Recipients

## 2021-03-08 NOTE — PROGRESS NOTES
Assessment/Plan:     Diagnoses and all orders for this visit:    Rib pain on left side  -     Ambulatory referral to Pain Management; Future    Bilateral thoracic back pain, unspecified chronicity  -     Diclofenac Sodium (VOLTAREN) 1 %; Apply 2 g topically 4 (four) times a day  -     Ambulatory referral to Pain Management; Future    History of non-ST elevation myocardial infarction (NSTEMI)  -     nitroglycerin (NITROSTAT) 0 4 mg SL tablet; Place 1 tablet (0 4 mg total) under the tongue every 5 (five) minutes as needed for chest pain    Chest pain, unspecified type  -     nitroglycerin (NITROSTAT) 0 4 mg SL tablet; Place 1 tablet (0 4 mg total) under the tongue every 5 (five) minutes as needed for chest pain  -     naproxen (NAPROSYN) 500 mg tablet; Take 1 tablet (500 mg total) by mouth 2 (two) times a day with meals    Screening for colon cancer  -     Cologuard; Future    Encounter for immunization  -     influenza vaccine, quadrivalent, recombinant, PF, 0 5 mL, for patients 18 yr+ (FLUBLOK)        - Continue naproxen, flexeril, and diclofenac gel prn pain  Referral provided to pain management for further evaluation  - Nitroglycerine refilled for prn use  Continue follow up with cardiology  - Screening cologuard ordered  - Flu vaccine given in the office today    Return in about 3 months (around 6/8/2021) for Next scheduled follow up  Subjective:        Patient ID: Emir Zaldivar is a 45 y o  male  Chief Complaint   Patient presents with    Transition of Care Management     rib fracture, left side       Kirsten Barthel is a 45year old male with history of NSTEMI s/p stent 6/2019, HTN, HLD, and tobacco abuse, presenting for ED follow up  Patient was evaluated in the ED 3/4 with left sided chest/back pain  Rib xray showed irregular costal cartilage calcification of the inferior left ribs, no acute fracture  He was discharged with flexeril prn    He presents today with ongoing pain, made worse with movement and palpation  Denies rash  Patient has ongoing chronic thoracic back pain, currently managed with naproxen and diclofenac gel prn  Thoracic xray from 6/2020 with mild scoliosis with disc space narrowing and arthrosis  HTN managed with metoprolol 25 mg daily  BP well controlled at 128/84 today  HLD managed with atorvastatin 80 mg daily  Last lipid panel from 9/2019 with total cholesterol 192, trig 93, LDL 87  Continues to smoke 1ppd        The following portions of the patient's history were reviewed and updated as appropriate: allergies, current medications, past family history, past medical history, past social history, past surgical history and problem list     Patient Active Problem List   Diagnosis    History of non-ST elevation myocardial infarction (NSTEMI)    Tobacco abuse    Hypercholesterolemia    Blurry vision, bilateral    Fatigue    Overweight    Prediabetes    Chest pain       Current Outpatient Medications   Medication Sig Dispense Refill    aspirin 81 mg chewable tablet Chew 1 tablet (81 mg total) daily 90 tablet 3    atorvastatin (LIPITOR) 80 mg tablet Take 1 tablet (80 mg total) by mouth daily with dinner 90 tablet 3    Blood Pressure Monitoring KIT by Does not apply route 2 (two) times a day 1 kit 0    cyclobenzaprine (FLEXERIL) 10 mg tablet Take 1 tablet (10 mg total) by mouth 2 (two) times a day as needed for muscle spasms 20 tablet 0    Diclofenac Sodium (VOLTAREN) 1 % Apply 2 g topically 4 (four) times a day 100 g 5    metoprolol succinate (TOPROL-XL) 25 mg 24 hr tablet Take 1 tablet (25 mg total) by mouth daily 90 tablet 3    naproxen (NAPROSYN) 500 mg tablet Take 1 tablet (500 mg total) by mouth 2 (two) times a day with meals 60 tablet 5    nitroglycerin (NITROSTAT) 0 4 mg SL tablet Place 1 tablet (0 4 mg total) under the tongue every 5 (five) minutes as needed for chest pain 25 tablet 3    clopidogrel (PLAVIX) 75 mg tablet Take 1 tablet (75 mg total) by mouth daily (Patient not taking: Reported on 1/24/2021) 90 tablet 3    traMADol (ULTRAM) 50 mg tablet Take 1 tablet (50 mg total) by mouth every 6 (six) hours as needed for moderate pain for up to 12 doses (Patient not taking: Reported on 3/8/2021) 12 tablet 0     No current facility-administered medications for this visit           Past Medical History:   Diagnosis Date    Hyperlipidemia     Hypertension     MI (myocardial infarction) ([facility] [address] )         Past Surgical History:   Procedure Laterality Date    CORONARY ANGIOPLASTY WITH STENT PLACEMENT      HAND SURGERY          Social History     Socioeconomic History    Marital status: /Civil Union     Spouse name: Not on file    Number of children: Not on file    Years of education: Not on file    Highest education level: Not on file   Occupational History    Not on file   Social Needs    Financial resource strain: Not on file    Food insecurity     Worry: Not on file     Inability: Not on file   [employer] needs     Medical: Not on file     Non-medical: Not on file   Tobacco Use    Smoking status: Current Every Day Smoker     Packs/day: 1 00     Types: Cigarettes    Smokeless tobacco: Never Used   Substance and Sexual Activity    Alcohol use: Yes     Comment: occasional    Drug use: Not Currently    Sexual activity: Yes     Partners: Female   Lifestyle    Physical activity     Days per week: Not on file     Minutes per session: Not on file    Stress: Not on file   Relationships    Social connections     Talks on phone: Not on file     Gets together: Not on file     Attends Zoroastrianism service: Not on file     Active member of club or organization: Not on file     Attends meetings of clubs or organizations: Not on file     Relationship status: Not on file    Intimate partner violence     Fear of current or ex partner: Not on file     Emotionally abused: Not on file     Physically abused: Not on file     Forced sexual activity: Not on file   Other Topics Concern    Not on file   Social History Narrative    Not on file        Review of Systems   Constitutional: Negative for chills, diaphoresis and fever  Respiratory: Negative for cough, chest tightness, shortness of breath and wheezing  Cardiovascular: Negative for chest pain, palpitations and leg swelling  Gastrointestinal: Negative for abdominal pain, blood in stool, constipation, diarrhea, nausea and vomiting  Musculoskeletal: Positive for arthralgias, back pain and myalgias  Skin: Negative for rash and wound  Neurological: Negative for dizziness, syncope, weakness, light-headedness and headaches  Objective:      /84 (BP Location: Left arm, Patient Position: Sitting, Cuff Size: Adult)   Pulse 80   Temp 97 8 °F (36 6 °C) (Tympanic)   Resp 18   Ht 6' 1" (1 854 m)   Wt 100 kg (221 lb)   SpO2 99%   BMI 29 16 kg/m²          Physical Exam  Vitals signs and nursing note reviewed  Constitutional:       General: He is not in acute distress  Appearance: Normal appearance  HENT:      Head: Normocephalic and atraumatic  Neck:      Musculoskeletal: Normal range of motion and neck supple  Cardiovascular:      Rate and Rhythm: Normal rate and regular rhythm  Heart sounds: Normal heart sounds  No murmur  Pulmonary:      Effort: Pulmonary effort is normal  No respiratory distress  Breath sounds: Normal breath sounds  No wheezing  Musculoskeletal:         General: Tenderness present  Thoracic back: He exhibits tenderness  Right lower leg: No edema  Left lower leg: No edema  Comments: Tenderness to palpation over the mid-thoracic spine and along the left lower ribs   Lymphadenopathy:      Cervical: No cervical adenopathy  Skin:     General: Skin is warm and dry  Neurological:      General: No focal deficit present  Mental Status: He is alert and oriented to person, place, and time  Cranial Nerves: No cranial nerve deficit  Psychiatric:         Mood and Affect: Mood normal          Behavior: Behavior normal

## 2021-03-22 ENCOUNTER — CONSULT (OUTPATIENT)
Dept: PAIN MEDICINE | Facility: CLINIC | Age: 51
End: 2021-03-22
Payer: COMMERCIAL

## 2021-03-22 VITALS
DIASTOLIC BLOOD PRESSURE: 72 MMHG | SYSTOLIC BLOOD PRESSURE: 118 MMHG | TEMPERATURE: 98.6 F | BODY MASS INDEX: 29.58 KG/M2 | HEIGHT: 73 IN | WEIGHT: 223.2 LBS

## 2021-03-22 DIAGNOSIS — S22.32XA LEFT RIB FRACTURE: ICD-10-CM

## 2021-03-22 DIAGNOSIS — M54.14 THORACIC RADICULOPATHY: ICD-10-CM

## 2021-03-22 DIAGNOSIS — M54.9 MID BACK PAIN: Primary | ICD-10-CM

## 2021-03-22 DIAGNOSIS — M47.816 LUMBAR SPONDYLOSIS: ICD-10-CM

## 2021-03-22 PROCEDURE — 3008F BODY MASS INDEX DOCD: CPT | Performed by: ANESTHESIOLOGY

## 2021-03-22 PROCEDURE — 99244 OFF/OP CNSLTJ NEW/EST MOD 40: CPT | Performed by: ANESTHESIOLOGY

## 2021-03-22 PROCEDURE — 4004F PT TOBACCO SCREEN RCVD TLK: CPT | Performed by: ANESTHESIOLOGY

## 2021-03-22 RX ORDER — CYCLOBENZAPRINE HCL 10 MG
10 TABLET ORAL 2 TIMES DAILY PRN
Qty: 20 TABLET | Refills: 0 | Status: SHIPPED | OUTPATIENT
Start: 2021-03-22 | End: 2021-06-14 | Stop reason: SDUPTHER

## 2021-03-22 NOTE — PROGRESS NOTES
Assessment:  No diagnosis found  Plan:   patient is a 50-year-old male with complaints of midback pain and left-sided chest pain with chronic pain syndrome secondary to left-sided rib fractures, midback pain, the history significant for heart disease presents to office for initial consultation  Patient reports the pain wraps around the T5-T6 nerve root  Distribution to the left  Patient mom has been monitored for heart disease and is currently stable  1  We will order an x-ray of the thoracic spine  2  We will order physical therapy thoracic spine strengthening exercises   3  We will refill  Flexeril 10 mg p o  t i d   4  Follow-up in 1 month    History of Present Illness: The patient is a 45 y o  male who presents for consultation in regards to Back Pain  Symptoms have been present for several for months  Symptoms began without any precipitating injury or trauma  Pain is reported to be 5 on the numeric rating scale  Symptoms are felt nearly constantly and worst in the no typical pattern  Symptoms are characterized as sharp and dull/aching  Symptoms are associated with no weakness  Aggravating factors include nothing  Relieving factors include nothing  No change in symptoms with kneeling, lying down, standing, bending, leaning forward, leaning bckward, sitting, walking, exercise, turning the head, relaxation, coughing/sneezing and bowel movements  Treatments that have been helpful include heat/ice  Medications to relieve symptoms include none  Review of Systems:    Review of Systems   Constitutional: Negative for fever and unexpected weight change  HENT: Negative for trouble swallowing  Eyes: Negative for visual disturbance  Respiratory: Negative for shortness of breath and wheezing  Cardiovascular: Positive for chest pain  Negative for palpitations  Gastrointestinal: Negative for constipation, diarrhea, nausea and vomiting  Endocrine: Positive for polyuria   Negative for cold intolerance, heat intolerance and polydipsia  Genitourinary: Negative for difficulty urinating and frequency  Musculoskeletal: Negative for arthralgias, gait problem, joint swelling and myalgias  Skin: Negative for rash  Neurological: Positive for dizziness  Negative for seizures, syncope, weakness and headaches  Hematological: Does not bruise/bleed easily  Psychiatric/Behavioral: Negative for dysphoric mood  All other systems reviewed and are negative  Past Medical History:   Diagnosis Date    Hyperlipidemia     Hypertension     MI (myocardial infarction) ([facility] [address] )        Past Surgical History:   Procedure Laterality Date    CORONARY ANGIOPLASTY WITH STENT PLACEMENT      HAND SURGERY         No family history on file      Social History     Occupational History    Not on file   Tobacco Use    Smoking status: Current Every Day Smoker     Packs/day: 1 00     Types: Cigarettes    Smokeless tobacco: Never Used   Substance and Sexual Activity    Alcohol use: Yes     Comment: occasional    Drug use: Not Currently    Sexual activity: Yes     Partners: Female         Current Outpatient Medications:     aspirin 81 mg chewable tablet, Chew 1 tablet (81 mg total) daily, Disp: 90 tablet, Rfl: 3    atorvastatin (LIPITOR) 80 mg tablet, Take 1 tablet (80 mg total) by mouth daily with dinner, Disp: 90 tablet, Rfl: 3    Blood Pressure Monitoring KIT, by Does not apply route 2 (two) times a day, Disp: 1 kit, Rfl: 0    clopidogrel (PLAVIX) 75 mg tablet, Take 1 tablet (75 mg total) by mouth daily (Patient not taking: Reported on 1/24/2021), Disp: 90 tablet, Rfl: 3    cyclobenzaprine (FLEXERIL) 10 mg tablet, Take 1 tablet (10 mg total) by mouth 2 (two) times a day as needed for muscle spasms, Disp: 20 tablet, Rfl: 0    Diclofenac Sodium (VOLTAREN) 1 %, Apply 2 g topically 4 (four) times a day, Disp: 100 g, Rfl: 5    metoprolol succinate (TOPROL-XL) 25 mg 24 hr tablet, Take 1 tablet (25 mg total) by mouth daily, Disp: 90 tablet, Rfl: 3    naproxen (NAPROSYN) 500 mg tablet, Take 1 tablet (500 mg total) by mouth 2 (two) times a day with meals, Disp: 60 tablet, Rfl: 5    nitroglycerin (NITROSTAT) 0 4 mg SL tablet, Place 1 tablet (0 4 mg total) under the tongue every 5 (five) minutes as needed for chest pain, Disp: 25 tablet, Rfl: 3    traMADol (ULTRAM) 50 mg tablet, Take 1 tablet (50 mg total) by mouth every 6 (six) hours as needed for moderate pain for up to 12 doses (Patient not taking: Reported on 3/8/2021), Disp: 12 tablet, Rfl: 0    No Known Allergies    Physical Exam:    /72 (BP Location: Left arm, Patient Position: Sitting, Cuff Size: Large)   Temp 98 6 °F (37 °C)   Ht 6' 1" (1 854 m)   Wt 101 kg (223 lb 3 2 oz)   BMI 29 45 kg/m²     Constitutional: normal, well developed, well nourished, alert, in no distress and non-toxic and no overt pain behavior  Eyes: anicteric  HEENT: grossly intact  Neck: supple, symmetric, trachea midline and no masses   Pulmonary:even and unlabored  Cardiovascular:No edema or pitting edema present  Skin:Normal without rashes or lesions and well hydrated  Psychiatric:Mood and affect appropriate  Neurologic:Cranial Nerves II-XII grossly intact  Musculoskeletal:normal      Thoracic Spine examination demonstrates  Bilateral thoracic paraspinal musculature tender to palpation with muscle spasms noted throughout her paraspinals  Imaging  THORACIC SPINE     INDICATION:   M54 6: Pain in thoracic spine      COMPARISON:  None     VIEWS:  XR SPINE THORACIC 3 VW        FINDINGS:     There is no fracture or pathologic bone lesion       There is mild levoscoliosis      There is mild disc space narrowing in the mid thoracic spine    There is mild ventral aspect arthrosis       There is no displacement of the paraspinal line       The pedicles appear intact      IMPRESSION:     No acute osseous abnormality        Degenerative changes as described

## 2021-03-22 NOTE — PATIENT INSTRUCTIONS
Lower Back Exercises   WHAT YOU NEED TO KNOW:   What do I need to know about lower back exercises? Lower back exercises help heal and strengthen your back muscles to prevent another injury  Ask your healthcare provider if you need to see a physical therapist for more advanced exercises  · Do the exercises on a mat or firm surface  (not on a bed) to support your spine and prevent low back pain  · Move slowly and smoothly  Avoid fast or jerky motions  · Breathe normally  Do not hold your breath  · Stop if you feel pain  It is normal to feel some discomfort at first  Regular exercise will help decrease your discomfort over time  How do I perform lower back exercises safely? Your healthcare provider may recommend that you do back exercises 10 to 30 minutes each day  He may also recommend that you do exercises 1 to 3 times each day  Ask your healthcare provider which exercises are best for you and how often to do them  · Ankle pumps:  Lie on your back  Move your foot up (with your toes pointing toward your head)  Then, move your foot down (with your toes pointing away from you)  Repeat this exercise 10 times on each side  · Heel slides:  Lie on your back  Slowly bend one leg and then straighten it  Next, bend the other leg and then straighten it  Repeat 10 times on each side  · Pelvic tilt:  Lie on your back with your knees bent and feet flat on the floor  Place your arms in a relaxed position beside your body  Tighten the muscles of your abdomen and flatten your back against the floor  Hold for 5 seconds  Repeat 5 times  · Back stretch:  Lie on your back with your hands behind your head  Bend your knees and turn the lower half of your body to one side  Hold this position for 10 seconds  Repeat 3 times on each side  · Straight leg raises:  Lie on your back with one leg straight  Bend the other knee   Tighten your abdomen and then slowly lift the straight leg up about 6 to 12 inches off the floor  Hold for 1 to 5 seconds  Lower your leg slowly  Repeat 10 times on each leg  · Knee-to-chest:  Lie on your back with your knees bent and feet flat on the floor  Pull one of your knees toward your chest and hold it there for 5 seconds  Return your leg to the starting position  Lift the other knee toward your chest and hold for 5 seconds  Do this 5 times on each side  · Cat and camel:  Place your hands and knees on the floor  Arch your back upward toward the ceiling and lower your head  Round out your spine as much as you can  Hold for 5 seconds  Lift your head upward and push your chest downward toward the floor  Hold for 5 seconds  Do 3 sets or as directed  · Wall squats:  Stand with your back against a wall  Tighten the muscles of your abdomen  Slowly lower your body until your knees are bent at a 45 degree angle  Hold this position for 5 seconds  Slowly move back up to a standing position  Repeat 10 times  · Curl up:  Lie on your back with your knees bent and feet flat on the floor  Place your hands, palms down, underneath the curve in your lower back  Next, with your elbows on the floor, lift your shoulders and chest 2 to 3 inches  Keep your head in line with your shoulders  Hold this position for 5 seconds  When you can do this exercise without pain for 10 to 15 seconds, you may add a rotation  While your shoulders and chest are lifted off the ground, turn slightly to the left and hold  Repeat on the other side  · Bird dog:  Place your hands and knees on the floor  Keep your wrists directly below your shoulders and your knees directly below your hips  Pull your belly button in toward your spine  Do not flatten or arch your back  Tighten your abdominal muscles  Raise one arm straight out so that it is aligned with your head  Next, raise the leg opposite your arm  Hold this position for 15 seconds  Lower your arm and leg slowly and change sides  Do 5 sets  When should I seek immediate care? · You have severe pain that prevents you from moving  When should I contact my healthcare provider? · Your pain becomes worse  · You have new pain  · You have questions or concerns about your condition or care  CARE AGREEMENT:   You have the right to help plan your care  Learn about your health condition and how it may be treated  Discuss treatment options with your healthcare providers to decide what care you want to receive  You always have the right to refuse treatment  The above information is an  only  It is not intended as medical advice for individual conditions or treatments  Talk to your doctor, nurse or pharmacist before following any medical regimen to see if it is safe and effective for you  © Copyright [address] Information is for End User's use only and may not be sold, redistributed or otherwise used for commercial purposes   All illustrations and images included in CareNotes® are the copyrighted property of [employer]  or [address] [place] [address] [employer]

## 2021-03-29 NOTE — PROGRESS NOTES
PT Evaluation     Today's date: 3/31/2021  Patient name: Rakan Rios  : 1970  MRN: [id number]  Referring provider: Melody Pink MD  Dx:   Encounter Diagnosis     ICD-10-CM    1  Mid back pain  M54 9 Ambulatory referral to Physical Therapy   2  Thoracic radiculopathy  M54 14 Ambulatory referral to Physical Therapy                  Assessment  Assessment details:   CURRENT FUNCTIONAL STATUS    Sleep tolerance 2-3 hours  Ability to bend forward: Poor  Ability to twist: Poor  Lifting tolerance 25 lbs without increased pain  SHORT TERM GOALS (2 WEEKS)    Increase thoracic spine AROM 25 % in all planes  Improve sitting posture  Decrease pain to 2-5/10  Sleep tolerance 3-4 hours  Ability to bend forward: Fair  Ability to twist: Fair  Lifting tolerance 35 lbs without increased pain  LONG TERM GOALS (DISCHARGE)    Thoracic Spine AROM: WFL, painfree  Sitting Posture: Good  Minimal/no left thoracic paraspinal spasm  Sitting posture: Good  Decrease pain to 0-3/10  Sleep tolerance 5-6 hours  Ability to bend forward: Good  Ability to twist: Good  Lifting tolerance 45 lbs without increased pain  Understanding of Dx/Px/POC: good   Prognosis: good    Goals  See assessment details above  Plan  Plan details: Rakan Rios is a 45 y o  male presenting to PT with pain, decreased range of motion, poor posture, and decreased tolerance to activity  This patient would benefit from skilled PT services to address these issues and to maximize function  A home exercise program was provided and all questions were answered   Thank you for the referral     Patient would benefit from: skilled physical therapy  Planned modality interventions: unattended electrical stimulation, thermotherapy: hydrocollator packs and cryotherapy  Planned therapy interventions: manual therapy, neuromuscular re-education, therapeutic exercise, therapeutic activities, self care and home exercise program  Frequency: 2x week  Duration in weeks: 4        Subjective Evaluation    History of Present Illness  Mechanism of injury: CC: Left sided mid back pain radiating into the left rib cage and sternum  HPI: The patient's mid back problem began this past November for no apparent reason  X-rays revealed DJD  He takes medication for relief  He is employed full duty as a   Pain  Current pain ratin  At best pain rating: 3  At worst pain ratin    Patient Goals  Patient goals for therapy: decreased pain and increased motion          Objective     General Comments:      Cervical/Thoracic Comments  CURRENT OBJECTIVE MEASUREMENTS    Thoracic Spine AROM: F=75 %, EXT=50 %, R ROT=50 %, L ROT=50 %, with all movements being painful  Sitting Posture: Fair  Significant left thoracic paraspinal spasm                        Precautions: None      Manuals 3/31        STM L Thoracic RK        P/A Mob L thoracic RK        EXT Mob Sitting         ROT Mob Sitting                  Neuro Re-Ed         3 Position Prone Scapular Retraction         Quadruped Arm Raises         Quadruped Arm and Leg Raises         Prone Planks         Sitting Posture Correction with Roll RK        Centralization Concepts         Body Mechanics Instruction for Lifting         Disc Mechanics Instruction         Prophylaxis of Recurrence                  Ther Ex         SBB 10x PRN at work        [employer]ps         EXT Sitting 10x TID HEP        ROT Sitting         TB High Row         TB Mid Row         TB Low Row         Hoist Row: S         LAT Pulldown         SA Pulldown         BACK EXT:   F , P , C         Rotary Torso:  S , P , C         PYR AB:  S , P         Pectoral Stretch Standing         Pectoral Stretch Supine                  Ther Activity         Squatting         Carrying         Lifting         Lunging                  Modalities         HP/IFC

## 2021-03-31 ENCOUNTER — EVALUATION (OUTPATIENT)
Dept: PHYSICAL THERAPY | Facility: CLINIC | Age: 51
End: 2021-03-31
Payer: COMMERCIAL

## 2021-03-31 DIAGNOSIS — M54.14 THORACIC RADICULOPATHY: ICD-10-CM

## 2021-03-31 DIAGNOSIS — M54.9 MID BACK PAIN: Primary | ICD-10-CM

## 2021-03-31 PROCEDURE — 97140 MANUAL THERAPY 1/> REGIONS: CPT | Performed by: PHYSICAL THERAPIST

## 2021-03-31 PROCEDURE — 97162 PT EVAL MOD COMPLEX 30 MIN: CPT | Performed by: PHYSICAL THERAPIST

## 2021-03-31 PROCEDURE — 97535 SELF CARE MNGMENT TRAINING: CPT | Performed by: PHYSICAL THERAPIST

## 2021-04-06 ENCOUNTER — OFFICE VISIT (OUTPATIENT)
Dept: PHYSICAL THERAPY | Facility: CLINIC | Age: 51
End: 2021-04-06
Payer: COMMERCIAL

## 2021-04-06 DIAGNOSIS — M54.9 MID BACK PAIN: Primary | ICD-10-CM

## 2021-04-06 DIAGNOSIS — M54.14 THORACIC RADICULOPATHY: ICD-10-CM

## 2021-04-06 PROCEDURE — 97110 THERAPEUTIC EXERCISES: CPT

## 2021-04-06 NOTE — PROGRESS NOTES
Daily Note     Today's date: 2021  Patient name: Corey Trammell  : 1970  MRN: [id number]  Referring provider: Mary Hardy MD  Dx:   Encounter Diagnosis     ICD-10-CM    1  Mid back pain  M54 9    2  Thoracic radiculopathy  M54 14                   Subjective: Patient reports no new changes since IE  He has not been performing HEP  Objective: See treatment diary below    Reviewed lifting mechanics, patient verbalized understanding  Assessment: Tolerated treatment fair  STM performed at the beginning of session for pain management, with good results for pain reduction  Centralization of pain with press ups and SBB, but began to radiate with standing pec stretch  He is able to perform TB resisted strengthening with exacerbation  Patient would benefit form continued PT  Plan: Continue per plan of care              Precautions: None      Manuals 3/31 4/6       STM L Thoracic RK MB       P/A Mob L thoracic RK        EXT Mob Sitting         ROT Mob Sitting                  Neuro Re-Ed         3 Position Prone Scapular Retraction         Quadruped Arm Raises         Quadruped Arm and Leg Raises         Prone Planks         Sitting Posture Correction with Roll RK        Centralization Concepts         Body Mechanics Instruction for Lifting  MB       Disc Mechanics Instruction         Prophylaxis of Recurrence                  Ther Ex         SBB 10x PRN at work 10x        Press Ups  10x       EXT Sitting 10x TID HEP 15x       ROT Sitting         TB High Row  L2 2x10       TB Mid Row  L2 2x10       TB Low Row  L2 2x10       Hoist Row: S         LAT Pulldown         SA Pulldown  L2 2x10       BACK EXT:   F , P , C         Rotary Torso:  S , P , C         PYR AB:  S , P         Pectoral Stretch Standing  4x20"       Pectoral Stretch Supine                  Ther Activity         Squatting         Carrying         Lifting         Lunging                  Modalities         HP/IFC  supine 10' post

## 2021-04-13 ENCOUNTER — APPOINTMENT (OUTPATIENT)
Dept: PHYSICAL THERAPY | Facility: CLINIC | Age: 51
End: 2021-04-13
Payer: COMMERCIAL

## 2021-04-15 ENCOUNTER — APPOINTMENT (OUTPATIENT)
Dept: PHYSICAL THERAPY | Facility: CLINIC | Age: 51
End: 2021-04-15
Payer: COMMERCIAL

## 2021-04-20 ENCOUNTER — OFFICE VISIT (OUTPATIENT)
Dept: PHYSICAL THERAPY | Facility: CLINIC | Age: 51
End: 2021-04-20
Payer: COMMERCIAL

## 2021-04-20 DIAGNOSIS — M54.14 THORACIC RADICULOPATHY: ICD-10-CM

## 2021-04-20 DIAGNOSIS — M54.9 MID BACK PAIN: Primary | ICD-10-CM

## 2021-04-20 PROCEDURE — 97110 THERAPEUTIC EXERCISES: CPT

## 2021-04-20 PROCEDURE — 97140 MANUAL THERAPY 1/> REGIONS: CPT

## 2021-04-20 NOTE — PROGRESS NOTES
Daily Note     Today's date: 2021  Patient name: Yolanda Houser  : 1970  MRN: [id number]  Referring provider: Linda Cabrera MD  Dx:   Encounter Diagnosis     ICD-10-CM    1  Mid back pain  M54 9    2  Thoracic radiculopathy  M54 14                   Subjective: Patient stated that his thoracis pain comes and goes  He has a helper at work to assist with heavy lifting, which is helping reduce his pain  Objective: See treatment diary below      Assessment: Tolerated treatment well  Continued with program to strengthen parascapular musculature, and patient is approprietly challenged  He does require frequent cuing to correct form, with fair carry over  His form was better when using the Hoist vs TB  He is experiencing the most relief with STM  Patient would benefit from continued PT  Plan: Continue per plan of care             Precautions: None      Manuals 3/31 4/6 4/20      STM L Thoracic RK MB MB      P/A Mob L thoracic RK        EXT Mob Sitting         ROT Mob Sitting                  Neuro Re-Ed         3 Position Prone Scapular Retraction         Quadruped Arm Raises         Quadruped Arm and Leg Raises         Prone Planks         Sitting Posture Correction with Roll RK        Centralization Concepts         Body Mechanics Instruction for Lifting  MB       Disc Mechanics Instruction         Prophylaxis of Recurrence                  Ther Ex         SBB 10x PRN at work 10x  10x      Press Ups  10x 10x      EXT Sitting 10x TID HEP 15x 15x      YARIEL   2"      ROT Sitting         TB High Row  L2 2x10 L2 2x10      TB Mid Row  L2 2x10 L2 2x10      TB Low Row  L2 2x10 L2 2x10      Hoist Row: S   P2 10x      LAT Pulldown         SA Pulldown  L2 2x10 L2 2x10      BACK EXT:   F , P , C         Rotary Torso:  S , P , C         PYR AB:  S , P         Pectoral Stretch Standing  4x20"       Pectoral Stretch Supine   4x20"       Supine Tband H-abd   L1 2x10      Ther Activity         Squatting Carrying         Lifting         Lunging                  Modalities         HP/IFC  supine 10' post def

## 2021-04-27 ENCOUNTER — APPOINTMENT (OUTPATIENT)
Dept: PHYSICAL THERAPY | Facility: CLINIC | Age: 51
End: 2021-04-27
Payer: COMMERCIAL

## 2021-04-27 NOTE — PROGRESS NOTES
PT Re-Evaluation     Today's date: 2021  Patient name: Abilio Schwartz  : 1970  MRN: [id number]  Referring provider: Chris Angel MD  Dx:   Encounter Diagnosis     ICD-10-CM    1  Mid back pain  M54 9    2  Thoracic radiculopathy  M54 14                   Assessment  Assessment details:   CURRENT FUNCTIONAL STATUS    Sleep tolerance 3-4 hours  Ability to bend forward: Fair  Ability to twist: Fair  Lifting tolerance 30 lbs without increased pain  SHORT TERM GOALS (2 WEEKS)    Increase thoracic spine AROM 10 % in all planes  Improve sitting posture  Decrease pain to 2-5/10  Sleep tolerance 4-5 hours  Ability to bend forward: Fair/Good  Ability to twist: Fair/Good  Lifting tolerance 35 lbs without increased pain  LONG TERM GOALS (DISCHARGE)    Thoracic Spine AROM: WFL, painfree -partially met  Sitting Posture: Good-partially met  Minimal/no left thoracic paraspinal spasm -partially met  Sitting posture: Good-partially met  Decrease pain to 0-3/10 -partially met  Sleep tolerance 5-6 hours  -partially met  Ability to bend forward: Good-partially met  Ability to twist: Good-partially met  Lifting tolerance 45 lbs without increased pain  -partially met    Understanding of Dx/Px/POC: good   Prognosis: good    Goals  See assessment details above  Plan  Plan details: The patient has shown improvement in PT demonstrating decreased pain, increased range of motion, improved posture, and increased tolerance to activity  The patient continues to present with pain, decreased ROM, poor posture, and decreased tolerance to activity  The patient would benefit from continued skilled PT services to address these issues and to maximize function  The patient will also continue performing their HEP        Patient would benefit from: skilled physical therapy  Planned modality interventions: unattended electrical stimulation, thermotherapy: hydrocollator packs and cryotherapy  Planned therapy interventions: manual therapy, neuromuscular re-education, therapeutic exercise, therapeutic activities, self care and home exercise program  Frequency: 1x week  Duration in weeks: 4        Subjective Evaluation    History of Present Illness  Mechanism of injury: Subjective: The patient's mid back pain is relieved with clinictreatment and his HEP, but it is aggravated by sustained/ repetitive bending and lifting at work  Pain  Current pain ratin  At best pain rating: 3  At worst pain ratin    Patient Goals  Patient goals for therapy: decreased pain and increased motion          Objective     General Comments:      Cervical/Thoracic Comments  CURRENT OBJECTIVE MEASUREMENTS    Thoracic Spine AROM: F=90 %, EXT=60 %, R ROT=75 %, L ROT=60 %, with all L ROT being painful  Sitting Posture: Fair  Moderate left thoracic paraspinal spasm                        Precautions: None        Manuals 3/31 4/6 4/20  4/28       STM L Thoracic RK MB MB  RK       P/A Mob L thoracic RK      RK       EXT Mob Sitting               ROT Mob Sitting                               Neuro Re-Ed               3 Position Prone Scapular Retraction               Quadruped Arm Raises               Quadruped Arm and Leg Raises               Prone Planks               Sitting Posture Correction with Roll RK             Centralization Concepts               Body Mechanics Instruction for Lifting   MB           Disc Mechanics Instruction               Prophylaxis of Recurrence                               Ther Ex               SBB 10x PRN at work 10x  10x         Press Ups   10x 10x  10x       EXT Sitting 10x TID HEP 15x 15x  15x       YARIEL     2"  2"       ROT Sitting               TB High Row   L2 2x10 L2 2x10        TB Mid Row   L2 2x10 L2 2x10        TB Low Row   L2 2x10 L2 2x10        Hoist Row: S5     P2 10x        LAT Pulldown              SA Pulldown   L2 2x10 L2 2x10        BACK EXT:   F , P , C               Rotary Torso:  S , P , C               PYR AB:  S , P               Pectoral Stretch Standing   4x20"           Pectoral Stretch Supine     4x20"         Supine Tband H-abd     L1 2x10        Ther Activity               Squatting               Carrying               Lifting               Lunging                               Modalities               HP/IFC   supine 10' post def

## 2021-04-28 ENCOUNTER — EVALUATION (OUTPATIENT)
Dept: PHYSICAL THERAPY | Facility: CLINIC | Age: 51
End: 2021-04-28
Payer: COMMERCIAL

## 2021-04-28 DIAGNOSIS — M54.14 THORACIC RADICULOPATHY: ICD-10-CM

## 2021-04-28 DIAGNOSIS — M54.9 MID BACK PAIN: Primary | ICD-10-CM

## 2021-04-28 PROCEDURE — 97140 MANUAL THERAPY 1/> REGIONS: CPT | Performed by: PHYSICAL THERAPIST

## 2021-04-28 PROCEDURE — 97110 THERAPEUTIC EXERCISES: CPT | Performed by: PHYSICAL THERAPIST

## 2021-05-04 ENCOUNTER — IMMUNIZATIONS (OUTPATIENT)
Dept: FAMILY MEDICINE CLINIC | Facility: HOSPITAL | Age: 51
End: 2021-05-04

## 2021-05-04 ENCOUNTER — APPOINTMENT (OUTPATIENT)
Dept: PHYSICAL THERAPY | Facility: CLINIC | Age: 51
End: 2021-05-04
Payer: COMMERCIAL

## 2021-05-04 DIAGNOSIS — Z23 ENCOUNTER FOR IMMUNIZATION: Primary | ICD-10-CM

## 2021-05-04 PROCEDURE — 0011A SARS-COV-2 / COVID-19 MRNA VACCINE (MODERNA) 100 MCG: CPT

## 2021-05-04 PROCEDURE — 91301 SARS-COV-2 / COVID-19 MRNA VACCINE (MODERNA) 100 MCG: CPT

## 2021-05-11 ENCOUNTER — OFFICE VISIT (OUTPATIENT)
Dept: PHYSICAL THERAPY | Facility: CLINIC | Age: 51
End: 2021-05-11
Payer: COMMERCIAL

## 2021-05-11 DIAGNOSIS — M54.9 MID BACK PAIN: Primary | ICD-10-CM

## 2021-05-11 DIAGNOSIS — M54.14 THORACIC RADICULOPATHY: ICD-10-CM

## 2021-05-11 PROCEDURE — 97110 THERAPEUTIC EXERCISES: CPT

## 2021-05-11 PROCEDURE — 97140 MANUAL THERAPY 1/> REGIONS: CPT

## 2021-05-11 NOTE — PROGRESS NOTES
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  Objective: See treatment diary below      Assessment:  Patient continues to present with L sided thoracic pain  Poor tolerance to tasks trialed today  He is only experiencing limited relief from [facility] and [facility], with no long lasting effects  Patient may benefit from edu on lifting mechanics  Trial STM and MH at the beginning of session NV to see if tolerance to TE would improve  Plan: Continue per plan of care            Precautions: None        Manuals 3/31 4/6 4/20  4/28  5/11     STM L Thoracic RK MB MB  RK  MB     P/A Mob L thoracic RK      RK       EXT Mob Sitting               ROT Mob Sitting                               Neuro Re-Ed               3 Position Prone Scapular Retraction               Quadruped Arm Raises               Quadruped Arm and Leg Raises               Prone Planks               Sitting Posture Correction with Roll RK             Centralization Concepts               Body Mechanics Instruction for Lifting   MB           Disc Mechanics Instruction               Prophylaxis of Recurrence                               Ther Ex               SBB 10x PRN at work 10x  10x         Press Ups   10x 10x  10x  2x5     EXT Sitting 10x TID HEP 15x 15x  15x  p!     YARIEL     2"  2"  1' p!     ROT Sitting               TB High Row   L2 2x10 L2 2x10   def     TB Mid Row   L2 2x10 L2 2x10   def     TB Low Row   L2 2x10 L2 2x10   def     Hoist Row: S5     P2 10x        LAT Pulldown              SA Pulldown   L2 2x10 L2 2x10   def     BACK EXT:   F , P , C               Rotary Torso:  S , P , C               PYR AB:  S , P               Pectoral Stretch Standing   4x20"           Pectoral Stretch Supine     4x20"    4x20"     Supine Tband H-abd     L1 2x10        Ther Activity               Squatting               Carrying               Lifting               Lunging                               Modalities               HP/IFC   supine 10' post def    seated 15' HP only

## 2021-05-17 ENCOUNTER — HOSPITAL ENCOUNTER (OUTPATIENT)
Dept: RADIOLOGY | Facility: HOSPITAL | Age: 51
Discharge: HOME | End: 2021-05-17
Attending: ANESTHESIOLOGY
Payer: COMMERCIAL

## 2021-05-17 DIAGNOSIS — I21.4 NSTEMI (NON-ST ELEVATED MYOCARDIAL INFARCTION) (HCC): ICD-10-CM

## 2021-05-17 DIAGNOSIS — E78.00 HYPERCHOLESTEROLEMIA: ICD-10-CM

## 2021-05-17 DIAGNOSIS — M54.14 THORACIC RADICULOPATHY: ICD-10-CM

## 2021-05-17 DIAGNOSIS — M54.9 MID BACK PAIN: ICD-10-CM

## 2021-05-17 PROCEDURE — 72072 X-RAY EXAM THORAC SPINE 3VWS: CPT

## 2021-05-17 RX ORDER — ATORVASTATIN CALCIUM 80 MG/1
TABLET, FILM COATED ORAL
Qty: 90 TABLET | Refills: 3 | Status: SHIPPED | OUTPATIENT
Start: 2021-05-17 | End: 2021-08-04 | Stop reason: SDUPTHER

## 2021-05-17 RX ORDER — METOPROLOL SUCCINATE 25 MG/1
TABLET, EXTENDED RELEASE ORAL
Qty: 90 TABLET | Refills: 3 | Status: SHIPPED | OUTPATIENT
Start: 2021-05-17 | End: 2021-08-04 | Stop reason: SDUPTHER

## 2021-05-27 NOTE — PROGRESS NOTES
PT Discharge    Today's date: 2021  Patient name: Wang Ojeda  : 1970  MRN: [id number]  Referring provider: Yoel Zamora MD  Dx:   Encounter Diagnosis     ICD-10-CM    1  Mid back pain  M54 9    2  Thoracic radiculopathy  M54 14        Start Time: 1636  Stop Time: 1721  Total time in clinic (min): 45 minutes    Assessment  Assessment details:   CURRENT FUNCTIONAL STATUS    Sleep tolerance 3-4 hours  Ability to bend forward: Fair  Ability to twist: Fair  Lifting tolerance 30 lbs without increased pain  SHORT TERM GOALS (2 WEEKS)    Increase thoracic spine AROM 10 % in all planes  Improve sitting posture  Decrease pain to 2-5/10  Sleep tolerance 4-5 hours  Ability to bend forward: Fair/Good  Ability to twist: Fair/Good  Lifting tolerance 35 lbs without increased pain  LONG TERM GOALS (DISCHARGE)    Thoracic Spine AROM: WFL, painfree -partially met  Sitting Posture: Good-partially met  Minimal/no left thoracic paraspinal spasm -partially met  Sitting posture: Good-partially met  Decrease pain to 0-3/10 -partially met  Sleep tolerance 5-6 hours  -partially met  Ability to bend forward: Good-partially met  Ability to twist: Good-partially met  Lifting tolerance 45 lbs without increased pain  -partially met    Understanding of Dx/Px/POC: good   Prognosis: good    Goals  See assessment details above  Plan  Plan details: Elicia Smith has not contacted our office for treatment since 21 and will be discharged from outpatient physical therapy care due to expiration of the plan of care  No updated objective measures are available for this report due to self discharge          Patient would benefit from: skilled physical therapy  Planned modality interventions: unattended electrical stimulation, thermotherapy: hydrocollator packs and cryotherapy  Planned therapy interventions: manual therapy, neuromuscular re-education, therapeutic exercise, therapeutic activities, self care and home exercise program        Subjective Evaluation    Pain  Current pain ratin  At best pain rating: 3  At worst pain ratin    Patient Goals  Patient goals for therapy: decreased pain and increased motion          Objective     General Comments:      Cervical/Thoracic Comments  CURRENT OBJECTIVE MEASUREMENTS    Thoracic Spine AROM: F=90 %, EXT=60 %, R ROT=75 %, L ROT=60 %, with all L ROT being painful  Sitting Posture: Fair  Moderate left thoracic paraspinal spasm                        Precautions: None        Manuals 3/31 4/6 4/20  4/28       STM L Thoracic RK MB MB  RK       P/A Mob L thoracic RK      RK       EXT Mob Sitting               ROT Mob Sitting                               Neuro Re-Ed               3 Position Prone Scapular Retraction               Quadruped Arm Raises               Quadruped Arm and Leg Raises               Prone Planks               Sitting Posture Correction with Roll RK             Centralization Concepts               Body Mechanics Instruction for Lifting   MB           Disc Mechanics Instruction               Prophylaxis of Recurrence                               Ther Ex               SBB 10x PRN at work 10x  10x         Press Ups   10x 10x  10x       EXT Sitting 10x TID HEP 15x 15x  15x       YARIEL     2"  2"       ROT Sitting               TB High Row   L2 2x10 L2 2x10        TB Mid Row   L2 2x10 L2 2x10        TB Low Row   L2 2x10 L2 2x10        Hoist Row: S5     P2 10x        LAT Pulldown              SA Pulldown   L2 2x10 L2 2x10        BACK EXT:   F , P , C               Rotary Torso:  S , P , C               PYR AB:  S , P               Pectoral Stretch Standing   4x20"           Pectoral Stretch Supine     4x20"         Supine Tband H-abd     L1 2x10        Ther Activity               Squatting               Carrying               Lifting               Lunging                               Modalities               HP/IFC   supine 10' post def

## 2021-06-03 ENCOUNTER — IMMUNIZATIONS (OUTPATIENT)
Dept: FAMILY MEDICINE CLINIC | Facility: HOSPITAL | Age: 51
End: 2021-06-03

## 2021-06-03 DIAGNOSIS — Z23 ENCOUNTER FOR IMMUNIZATION: Primary | ICD-10-CM

## 2021-06-03 PROCEDURE — 0012A SARS-COV-2 / COVID-19 MRNA VACCINE (MODERNA) 100 MCG: CPT

## 2021-06-03 PROCEDURE — 91301 SARS-COV-2 / COVID-19 MRNA VACCINE (MODERNA) 100 MCG: CPT

## 2021-06-14 ENCOUNTER — OFFICE VISIT (OUTPATIENT)
Dept: PAIN MEDICINE | Facility: CLINIC | Age: 51
End: 2021-06-14
Payer: COMMERCIAL

## 2021-06-14 VITALS
WEIGHT: 226.8 LBS | BODY MASS INDEX: 30.06 KG/M2 | HEIGHT: 73 IN | DIASTOLIC BLOOD PRESSURE: 79 MMHG | HEART RATE: 84 BPM | SYSTOLIC BLOOD PRESSURE: 128 MMHG

## 2021-06-14 DIAGNOSIS — S22.32XA LEFT RIB FRACTURE: ICD-10-CM

## 2021-06-14 DIAGNOSIS — M54.14 THORACIC RADICULOPATHY: ICD-10-CM

## 2021-06-14 DIAGNOSIS — M54.9 MID BACK PAIN: ICD-10-CM

## 2021-06-14 DIAGNOSIS — M47.816 LUMBAR SPONDYLOSIS: ICD-10-CM

## 2021-06-14 PROCEDURE — 99214 OFFICE O/P EST MOD 30 MIN: CPT | Performed by: ANESTHESIOLOGY

## 2021-06-14 PROCEDURE — 4004F PT TOBACCO SCREEN RCVD TLK: CPT | Performed by: ANESTHESIOLOGY

## 2021-06-14 PROCEDURE — 3008F BODY MASS INDEX DOCD: CPT | Performed by: ANESTHESIOLOGY

## 2021-06-14 RX ORDER — CYCLOBENZAPRINE HCL 10 MG
10 TABLET ORAL 2 TIMES DAILY PRN
Qty: 60 TABLET | Refills: 1 | Status: SHIPPED | OUTPATIENT
Start: 2021-06-14 | End: 2021-07-23 | Stop reason: SDUPTHER

## 2021-06-14 NOTE — PROGRESS NOTES
Assessment:  1  Mid back pain    2  Thoracic radiculopathy    3  Lumbar spondylosis    4  Left rib fracture        Plan:  Patient is a 50-year-old male with complaints of low midback pain in pain right around to his chest with a history significant for rib fracture and chronic pain syndrome secondary to thoracic radiculopathy presents office for initial consultation  X-ray of thoracic spine only showed mild scoliosis without any degenerative disc changes  Patient has been participating physical therapy and denies any significant improvement of his status  1  We will order an MRI of the thoracic spine to better assess the discogenic pathology behind patient's thoracic radicular symptoms  2  We will continue Flexeril 10 mg p o  b i d   3  We will continue diclofenac 50 mg p o  b i d   4  Follow-up in 1 month to review diagnostic imaging and order interventional procedure accordingly  History of Present Illness: The patient is a 45 y o  male who presents for a follow up office visit in regards to Back Pain and Chest Pain  The patients current symptoms include   8/10 constant sharp, pins and needles without any particular time pattern  Current pain medications includes:   Flexeril, diclofenac   The patient reports that this regimen is providing 60% pain relief  The patient is reporting no side effects from this pain medication regimen  I have personally reviewed and/or updated the patient's past medical history, past surgical history, family history, social history, current medications, allergies, and vital signs today  Review of Systems  Review of Systems   Cardiovascular: Positive for chest pain  Neurological: Positive for dizziness  All other systems reviewed and are negative          Past Medical History:   Diagnosis Date    Hyperlipidemia     Hypertension     MI [facility]myocardial infarction) ([facility] [address] )        Past Surgical History:   Procedure Laterality Date    CORONARY ANGIOPLASTY WITH STENT PLACEMENT      HAND SURGERY         No family history on file      Social History     Occupational History    Not on file   Tobacco Use    Smoking status: Current Every Day Smoker     Packs/day: 1 00     Types: Cigarettes    Smokeless tobacco: Never Used   Vaping Use    Vaping Use: Never used   Substance and Sexual Activity    Alcohol use: Yes     Comment: occasional    Drug use: Not Currently    Sexual activity: Yes     Partners: Female         Current Outpatient Medications:     aspirin 81 mg chewable tablet, Chew 1 tablet (81 mg total) daily, Disp: 90 tablet, Rfl: 3    atorvastatin (LIPITOR) 80 mg tablet, TAKE 1 TABLET BY MOUTH DAILY WITH DINNER, Disp: 90 tablet, Rfl: 3    Blood Pressure Monitoring KIT, by Does not apply route 2 (two) times a day, Disp: 1 kit, Rfl: 0    clopidogrel (PLAVIX) 75 mg tablet, Take 1 tablet (75 mg total) by mouth daily (Patient not taking: Reported on 1/24/2021), Disp: 90 tablet, Rfl: 3    cyclobenzaprine (FLEXERIL) 10 mg tablet, Take 1 tablet (10 mg total) by mouth 2 (two) times a day as needed for muscle spasms, Disp: 20 tablet, Rfl: 0    Diclofenac Sodium (VOLTAREN) 1 %, Apply 2 g topically 4 (four) times a day, Disp: 100 g, Rfl: 5    diclofenac sodium (VOLTAREN) 50 mg EC tablet, Take 1 tablet (50 mg total) by mouth 2 (two) times a day, Disp: 60 tablet, Rfl: 1    metoprolol succinate (TOPROL-XL) 25 mg 24 hr tablet, TAKE 1 TABLET BY MOUTH EVERY DAY, Disp: 90 tablet, Rfl: 3    naproxen (NAPROSYN) 500 mg tablet, Take 1 tablet (500 mg total) by mouth 2 (two) times a day with meals, Disp: 60 tablet, Rfl: 5    nitroglycerin (NITROSTAT) 0 4 mg SL tablet, Place 1 tablet (0 4 mg total) under the tongue every 5 (five) minutes as needed for chest pain, Disp: 25 tablet, Rfl: 3    traMADol (ULTRAM) 50 mg tablet, Take 1 tablet (50 mg total) by mouth every 6 (six) hours as needed for moderate pain for up to 12 doses (Patient not taking: Reported on 3/8/2021), Disp: 12 tablet, Rfl: 0    No Known Allergies    Physical Exam:    Ht 6' 1" (1 854 m)   BMI 29 45 kg/m²     Constitutional:normal, well developed, well nourished, alert, in no distress and non-toxic and no overt pain behavior  Eyes:anicteric  HEENT:grossly intact  Neck:supple, symmetric, trachea midline and no masses   Pulmonary:even and unlabored  Cardiovascular:No edema or pitting edema present  Skin:Normal without rashes or lesions and well hydrated  Psychiatric:Mood and affect appropriate  Neurologic:Cranial Nerves II-XII grossly intact  Musculoskeletal:antalgic      Thoracic Spine examination demonstrates  Bilateral thoracic paraspinal musculature tender to palpation with muscle spasms noted throughout her paraspinals  Imaging  THORACIC SPINE     INDICATION:   M54 9: Dorsalgia, unspecified  M54 14: Radiculopathy, thoracic region      COMPARISON:  None     VIEWS:  XR SPINE THORACIC 3 VW        FINDINGS:     There is no fracture or pathologic bone lesion      Thoracic vertebral alignment is within normal limits    Mild scoliosis is noted convex to left similar prior study      No significant degenerative changes       There is no displacement of the paraspinal line       The pedicles appear intact      IMPRESSION:     No acute osseous abnormality

## 2021-06-14 NOTE — PATIENT INSTRUCTIONS

## 2021-06-21 ENCOUNTER — HOSPITAL ENCOUNTER (OUTPATIENT)
Dept: MRI IMAGING | Facility: HOSPITAL | Age: 51
Discharge: HOME | End: 2021-06-21
Attending: ANESTHESIOLOGY
Payer: COMMERCIAL

## 2021-06-21 DIAGNOSIS — M54.9 MID BACK PAIN: ICD-10-CM

## 2021-06-21 DIAGNOSIS — M54.14 THORACIC RADICULOPATHY: ICD-10-CM

## 2021-06-21 DIAGNOSIS — S22.32XA LEFT RIB FRACTURE: ICD-10-CM

## 2021-06-21 PROCEDURE — G1004 CDSM NDSC: HCPCS

## 2021-06-21 PROCEDURE — 72146 MRI CHEST SPINE W/O DYE: CPT

## 2021-07-22 ENCOUNTER — VBI (OUTPATIENT)
Dept: ADMINISTRATIVE | Facility: OTHER | Age: 51
End: 2021-07-22

## 2021-07-23 ENCOUNTER — OFFICE VISIT (OUTPATIENT)
Dept: PAIN MEDICINE | Facility: CLINIC | Age: 51
End: 2021-07-23
Payer: COMMERCIAL

## 2021-07-23 VITALS
BODY MASS INDEX: 29.82 KG/M2 | WEIGHT: 225 LBS | SYSTOLIC BLOOD PRESSURE: 122 MMHG | HEIGHT: 73 IN | HEART RATE: 73 BPM | DIASTOLIC BLOOD PRESSURE: 75 MMHG

## 2021-07-23 DIAGNOSIS — M51.24 THORACIC DISC HERNIATION: ICD-10-CM

## 2021-07-23 DIAGNOSIS — M54.14 THORACIC RADICULOPATHY: ICD-10-CM

## 2021-07-23 DIAGNOSIS — G89.4 CHRONIC PAIN SYNDROME: Primary | ICD-10-CM

## 2021-07-23 DIAGNOSIS — S22.32XA LEFT RIB FRACTURE: ICD-10-CM

## 2021-07-23 PROCEDURE — 99214 OFFICE O/P EST MOD 30 MIN: CPT | Performed by: NURSE PRACTITIONER

## 2021-07-23 RX ORDER — CYCLOBENZAPRINE HCL 10 MG
10 TABLET ORAL 2 TIMES DAILY PRN
Qty: 60 TABLET | Refills: 1 | OUTPATIENT
Start: 2021-07-23 | End: 2021-11-04

## 2021-07-23 NOTE — PROGRESS NOTES
Assessment:  1  Chronic pain syndrome    2  Thoracic radiculopathy    3  Thoracic disc herniation    4  Left rib fracture        Plan:    While the patient was in the office today, I did have a thorough conversation regarding their chronic pain syndrome, medication management, and treatment plan options  Patient is being seen for follow-up visit  He was last seen here in 06/14/2021 at which time an MRI of his thoracic spine was ordered  There is a left-sided disc protrusion at T5-6 and T8-9 resulting in left eccentric mild canal narrowing  There is also mild disc bulge at T6-7 and T3-4  MRI results were reviewed with patient is wife during today's visit  Patient will be scheduled for a T7-8 interlaminar epidural steroid injection  Complete risks and benefits including bleeding, infection, tissue reaction, nerve injury and allergic reaction were discussed  The approach was demonstrated using models and literature was provided  Verbal and written consent was obtained  Renewed Flexeril 10 mg twice daily  Follow-up 1 month after the injection  History of Present Illness: The patient is a 45 y o  male who presents for a follow up office visit in regards to Back Pain and Chest Pain  The patients current symptoms include  Complaints of thoracic pain that radiates to the anterior chest   Current pain level is an 8/10  Quality pain is described as sharp, pins and needles  Current pain medications includes:   Flexeril 10 mg twice daily if needed, his PCP prescribed naproxen 500 mg which he takes twice daily if needed   The patient reports that this regimen is providing  10% pain relief  The patient is reporting no side effects from this pain medication regimen  I have personally reviewed and/or updated the patient's past medical history, past surgical history, family history, social history, current medications, allergies, and vital signs today           Review of Systems  Review of Systems Constitutional: Negative for fatigue  HENT: Negative for ear pain and hearing loss  Eyes: Negative for redness  Respiratory: Negative for cough  Cardiovascular: Positive for chest pain  Negative for leg swelling  Gastrointestinal: Negative for anal bleeding and rectal pain  Endocrine: Negative for polydipsia  Genitourinary: Negative for hematuria  Musculoskeletal: Positive for arthralgias and back pain  Negative for joint swelling  Skin: Negative for rash  Neurological: Positive for dizziness  Negative for headaches  Hematological: Does not bruise/bleed easily  Psychiatric/Behavioral: Negative for behavioral problems and hallucinations           Past Medical History:   Diagnosis Date    Hyperlipidemia     Hypertension     MI (myocardial infarction) ([facility] [address] )        Past Surgical History:   Procedure Laterality Date    CORONARY ANGIOPLASTY WITH STENT PLACEMENT      HAND SURGERY         Family History   Problem Relation Age of Onset    No Known Problems Mother     No Known Problems Father        Social History     Occupational History    Not on file   Tobacco Use    Smoking status: Current Every Day Smoker     Packs/day: 1 00     Types: Cigarettes    Smokeless tobacco: Never Used   Vaping Use    Vaping Use: Never used   Substance and Sexual Activity    Alcohol use: Yes     Comment: occasional    Drug use: Not Currently    Sexual activity: Yes     Partners: Female         Current Outpatient Medications:     aspirin 81 mg chewable tablet, Chew 1 tablet (81 mg total) daily, Disp: 90 tablet, Rfl: 3    atorvastatin (LIPITOR) 80 mg tablet, TAKE 1 TABLET BY MOUTH DAILY WITH DINNER, Disp: 90 tablet, Rfl: 3    cyclobenzaprine (FLEXERIL) 10 mg tablet, Take 1 tablet (10 mg total) by mouth 2 (two) times a day as needed for muscle spasms, Disp: 60 tablet, Rfl: 1    Diclofenac Sodium (VOLTAREN) 1 %, Apply 2 g topically 4 (four) times a day, Disp: 100 g, Rfl: 5    diclofenac sodium (VOLTAREN) 50 mg EC tablet, Take 1 tablet (50 mg total) by mouth 2 (two) times a day, Disp: 60 tablet, Rfl: 1    metoprolol succinate (TOPROL-XL) 25 mg 24 hr tablet, TAKE 1 TABLET BY MOUTH EVERY DAY, Disp: 90 tablet, Rfl: 3    naproxen (NAPROSYN) 500 mg tablet, Take 1 tablet (500 mg total) by mouth 2 (two) times a day with meals, Disp: 60 tablet, Rfl: 5    nitroglycerin (NITROSTAT) 0 4 mg SL tablet, Place 1 tablet (0 4 mg total) under the tongue every 5 (five) minutes as needed for chest pain, Disp: 25 tablet, Rfl: 3    Blood Pressure Monitoring KIT, by Does not apply route 2 (two) times a day, Disp: 1 kit, Rfl: 0    No Known Allergies    Physical Exam:    /75 (BP Location: Right arm, Patient Position: Sitting, Cuff Size: Large)   Pulse 73   Ht 6' 1" (1 854 m)   Wt 102 kg (225 lb)   BMI 29 69 kg/m²     Constitutional:normal, well developed, well nourished, alert, in no distress and non-toxic and no overt pain behavior  Eyes:anicteric  HEENT:grossly intact  Neck:supple, symmetric, trachea midline and no masses   Pulmonary:even and unlabored  Cardiovascular:No edema or pitting edema present  Skin:Normal without rashes or lesions and well hydrated  Psychiatric:Mood and affect appropriate  Neurologic:Cranial Nerves II-XII grossly intact  Musculoskeletal:normal    Imaging  Study Result    Narrative & Impression   MRI THORACIC SPINE WITHOUT CONTRAST     INDICATION: Dorsalgia      COMPARISON:  X-ray thoracic spine 5/17/2021     TECHNIQUE:  Sagittal T1, sagittal T2, sagittal inversion recovery, axial T2,  axial 2D MERGE      IMAGE QUALITY: Diagnostic      FINDINGS:     ALIGNMENT: There is preserved normal thoracic kyphosis  No subluxation    Vertebral heights are maintained      MARROW SIGNAL:  No abnormal bone marrow signal or suspicious discrete marrow lesion      THORACIC CORD: Normal signal within the thoracic cord      PARAVERTEBRAL SOFT TISSUES:  Normal      THORACIC DEGENERATIVE CHANGE:     There is left paracentral disc protrusion at levels T5-6 and T8-9 resulting in left eccentric mild canal narrowing      Left paracentral disc protrusion at level T12-L1 along with ligamentum flavum thickening resulting in left eccentric mild canal stenosis      Left eccentric mild canal narrowing at level L1-2 secondary to left paracentral disc protrusion      There is mild disc bulge at T6-7 and T3-T4 mildly indenting ventral thecal sac      Disc bulge and ligamentum flavum thickening  Suggested moderate canal stenosis at levels C5-6 and C6-7 only imaged in sagittal T2 sequence for counting (series 3)        IMPRESSION:     1   Multilevel primarily left-sided mild canal stenosis as detailed      2  Suggested moderate degenerative canal stenosis at levels C5-6 and C6-7  These levels are only imaged in sagittal T2 sequence for counting and not included in axial images  May consider dedicated cervical spine MRI for better assessment            Workstation performed: [id number]          No orders to display       No orders of the defined types were placed in this encounter

## 2021-08-04 ENCOUNTER — OFFICE VISIT (OUTPATIENT)
Dept: CARDIOLOGY CLINIC | Facility: CLINIC | Age: 51
End: 2021-08-04
Payer: COMMERCIAL

## 2021-08-04 VITALS
SYSTOLIC BLOOD PRESSURE: 140 MMHG | OXYGEN SATURATION: 94 % | DIASTOLIC BLOOD PRESSURE: 78 MMHG | HEART RATE: 94 BPM | WEIGHT: 225 LBS | BODY MASS INDEX: 29.82 KG/M2 | HEIGHT: 73 IN

## 2021-08-04 DIAGNOSIS — I25.10 CORONARY ARTERY DISEASE INVOLVING NATIVE CORONARY ARTERY OF NATIVE HEART WITHOUT ANGINA PECTORIS: ICD-10-CM

## 2021-08-04 DIAGNOSIS — Z72.0 TOBACCO ABUSE: ICD-10-CM

## 2021-08-04 DIAGNOSIS — E78.00 HYPERCHOLESTEROLEMIA: ICD-10-CM

## 2021-08-04 DIAGNOSIS — R07.9 CHEST PAIN, UNSPECIFIED TYPE: ICD-10-CM

## 2021-08-04 DIAGNOSIS — I21.4 NSTEMI (NON-ST ELEVATED MYOCARDIAL INFARCTION) (HCC): ICD-10-CM

## 2021-08-04 DIAGNOSIS — I25.2 HISTORY OF NON-ST ELEVATION MYOCARDIAL INFARCTION (NSTEMI): Primary | ICD-10-CM

## 2021-08-04 PROCEDURE — 4004F PT TOBACCO SCREEN RCVD TLK: CPT

## 2021-08-04 PROCEDURE — 3008F BODY MASS INDEX DOCD: CPT

## 2021-08-04 PROCEDURE — 99214 OFFICE O/P EST MOD 30 MIN: CPT

## 2021-08-04 RX ORDER — METOPROLOL SUCCINATE 25 MG/1
25 TABLET, EXTENDED RELEASE ORAL DAILY
Qty: 90 TABLET | Refills: 3 | Status: SHIPPED | OUTPATIENT
Start: 2021-08-04 | End: 2022-02-08 | Stop reason: SDUPTHER

## 2021-08-04 RX ORDER — ATORVASTATIN CALCIUM 80 MG/1
80 TABLET, FILM COATED ORAL
Qty: 90 TABLET | Refills: 3 | Status: SHIPPED | OUTPATIENT
Start: 2021-08-04 | End: 2022-02-08 | Stop reason: SDUPTHER

## 2021-08-04 RX ORDER — ASPIRIN 81 MG/1
81 TABLET, CHEWABLE ORAL DAILY
Qty: 90 TABLET | Refills: 3 | Status: SHIPPED | OUTPATIENT
Start: 2021-08-04 | End: 2022-07-25

## 2021-08-04 RX ORDER — NITROGLYCERIN 0.4 MG/1
0.4 TABLET SUBLINGUAL
Qty: 25 TABLET | Refills: 3 | Status: SHIPPED | OUTPATIENT
Start: 2021-08-04 | End: 2022-02-08 | Stop reason: SDUPTHER

## 2021-08-04 NOTE — PROGRESS NOTES
Cardiology   MD Sukhjinder Kim MD Spence Mater, DO, [facility] MD Viola Stacy DO, Jeannine Bardales DO, [facility] [place]  -------------------------------------------------------------------  [facility]  [address] [place] [id number]  [phone]  [phone]  08/04/21  Isabella Calix  YOB: 1970   MRN: [id number]      Referring Physician: BRADLEY Andres [address] [place] [address],  [address] [place] [address] Drive, [address]     HPI: Isabella Calix is a 45 y o  male with coronary artery disease diagnosed in setting of a non ST segment elevation myocardial infarction in 2019 with drug-eluting stent times 1 to 1st obtuse marginal, dyslipidemia who presents today for follow-up  he did state at prior visit that he had chest pain  He states that the pain starts in his back and then comes around to his chest   He states if he cracks is back or fully stretches his chest he will feel a crack or a pop in his chest and then the pain goes away  He has been seen by pain management and was found to have a herniated disc which is suspected as causing his back pain with then referred pain to his chest    He denies any chest pressure or diaphoresis    He states he does a lot heavy lifting with his job  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, uses a sledgehammer  Review of Systems   Constitutional: Negative for chills and fever  HENT: Negative for facial swelling and sore throat  Eyes: Negative for visual disturbance  Respiratory: Negative for cough, chest tightness, shortness of breath and wheezing  Cardiovascular: Positive for chest pain  Negative for palpitations and leg swelling  Gastrointestinal: Negative for abdominal pain, blood in stool, constipation, diarrhea, nausea and vomiting  Endocrine: Negative for cold intolerance and heat intolerance  Genitourinary: Negative for decreased urine volume, difficulty urinating, dysuria and hematuria  Musculoskeletal: Positive for back pain  Negative for arthralgias and myalgias  Skin: Negative for rash  Neurological: Negative for dizziness, syncope, weakness and numbness  Psychiatric/Behavioral: Negative for agitation, behavioral problems and confusion  The patient is not nervous/anxious  OBJECTIVE  Vitals:    08/04/21 1532   BP: 140/78   Pulse: 94   SpO2: 94%       Physical Exam  Constitutional: awake, alert and oriented, in no acute distress, no obvious deformities  Head: Normocephalic, without obvious abnormality, atraumatic  Eyes: conjunctivae clear and moist  Sclera anicteric  No xanthelasmas  Pupils equal bilaterally  Extraocular motions are full  Ear nose mouth and throat: ears are symmetrical bilaterally, hearing appears to be equal bilaterally, no nasal discharge or epistaxis, oropharynx is clear with moist mucous membranes  Neck:  Trachea is midline, neck is supple, no thyromegaly or significant lymphadenopathy, there is full range of motion  Lungs: clear to auscultation bilaterally, no wheezes, no rales, no rhonchi, no accessory muscle use, breathing is nonlabored  Heart: regular rate and rhythm, S1, S2 normal, no murmur, no click, no rub and no gallop, no lower extremity edema  Abdomen: soft, non-tender; bowel sounds normal; no masses,  no organomegaly  Psychiatric:  Patient is oriented to time, place, person, mood/affect is negative for depression, anxiety, agitation, appears to have appropriate insight  Skin: Skin is warm, dry, intact  No obvious rashes or lesions on exposed extremities  Nail beds are pink with no cyanosis or clubbing  EKG:  No results found for this visit on 08/04/21  IMPRESSION:  1  Atypical chest pain, likely musculoskeletal related to his back pain  2  Coronary artery disease status post PCI to the [address] [place], [place] [address]use [facility] in June 2019  3  Dyslipidemia  4   Tobacco abuse    DISCUSSION/RECOMMENDATIONS:   His blood pressure is slightly elevated today but has been historically controlled, would continue current dose of metoprolol    Continue Lipitor 80 milligrams but check lipid panel    Continue with aspirin 81 milligrams, he is off of dual antiplatelet therapy now that he is greater than 1 year post PCI    Plan for follow-up 6 months    I instructed him if he develops chest pressure where he has to take nitroglycerin, he must notify me immediately, sent new Rx for nitroglycerin today to have on hand    Anastasiya Willingham DO, [facility] [place]  --------------------------------------------------------------------------------  TREADMILL STRESS  No results found for this or any previous visit      ----------------------------------------------------------------------------------------------  NUCLEAR STRESS TEST: Results for orders placed during the hospital encounter of 20    NM myocardial perfusion spect (stress and/or rest)    Narrative  [address] [address] [place] [address] 44, [facility] 34  [phone]    Exercise    Patient: Cris Puente  MR number: [id number]  Account number: [de-identified]  : 1970  Age: 47 years  Gender: Male  Status: Outpatient  Location: Stress lab  Height: 72 in  Weight: 214 lb  BP: 108/ 80 mmHg    Allergies: NO KNOWN ALLERGIES    Diagnosis: 786 50 - CHEST PAIN NOS    RN:  Nikolas Delcid RN  Primary Physician:  Daisy Sow  Referring Physician:  RODRIGUEZ Ortega  Group:  [facility]  Interpreting Physician:  Juanito Song DO    INDICATIONS: Coronary artery disease  HISTORY: chest pain The patient is a 47year old male  Chest pain status: chest pain  Other symptoms: dyspnea  Coronary artery disease risk factors: dyslipidemia, hypertension, smoking, and family history of premature coronary artery  disease  REST ECG: Normal sinus rhythm  PROCEDURE: The study was performed in the the Stress lab  Treadmill exercise testing was performed, using the Ace protocol  Systolic blood pressure was 108 mmHg, at the start of the study  Diastolic blood pressure was 80 mmHg, at the  start of the study  The heart rate was 68 bpm, at the start of the study  IV double checked  DYLLAN PROTOCOL:  HR bpm SBP mmHg DBP mmHg Symptoms  Baseline 68 108 80 none  Stage 1 92 110 80 --  Stage 2 100 114 82 --  Stage 3 112 120 84 --  Stage 4 139 -- -- --  Recovery 2 90 120 84 --  Recovery 3 97 100 72 --    STRESS SUMMARY: Duration of exercise was 12 min  The patient exercised to protocol stage 4  Maximal work rate was 13 4 METs  Functional capacity was normal  Maximal heart rate during stress was 141 bpm ( 83 % of maximal predicted heart  rate)  The heart rate response to stress was normal  There was normal resting blood pressure with a blunted response to stress  The rate-pressure product for the peak heart rate and blood pressure was 16545  There was no chest pain during  stress  The stress test was terminated due to moderate fatigue  The stress ECG was negative for ischemia and normal  There were no stress arrhythmias or conduction abnormalities  ISOTOPE ADMINISTRATION:  Resting isotope administration Stress isotope administration  Agent Tetrofosmin Tetrofosmin  Dose 11 mCi 32 7 mCi  Date 07/09/2020 07/09/2020  Injection-image interval 25 min 35 min    The radiopharmaceutical was injected one minute before the end of exercise  MYOCARDIAL PERFUSION IMAGING:  The image quality was good  Rotating projection images reveal mild diaphragmatic attenuation  Left ventricular size was normal  The TID ratio was 0 86  PERFUSION DEFECTS:  -  There was a small to moderate, moderately severe, fixed myocardial perfusion defect of the inferior wall likely due to diaphragm attenuation  GATED SPECT:  The calculated left ventricular ejection fraction was 49 %  There was no left ventricular regional abnormality  SUMMARY:  -  Stress results: Duration of exercise was 12 min   Target heart rate was not achieved  There was normal resting blood pressure with a blunted response to stress  There was no chest pain during stress  -  ECG conclusions: The stress ECG was negative for ischemia and normal   -  Perfusion imaging: There was a small to moderate, moderately severe, fixed myocardial perfusion defect of the inferior wall likely due to diaphragm attenuation   -  Gated SPECT: The calculated left ventricular ejection fraction was 49 %  There was no left ventricular regional abnormality  IMPRESSIONS: Normal study after maximal exercise  There was image artifact, without diagnostic evidence for perfusion abnormality  Diagnostic sensitivity was limited by submaximal stress  Prepared and signed by    Lacey Barakat DO  Signed 2020 11:52:57    No results found for this or any previous visit       --------------------------------------------------------------------------------  CATH:  Results for orders placed during the hospital encounter of 06/10/19    Cardiac catheterization    Narrative  [address] [place], [place] [address] [address]  [phone]    [facility]    Invasive Cardiovascular Lab Complete Report    Patient: Uyen Andre  MR number: [id number]  Account number: [de-identified]  Study date: 06/10/2019  Gender: Male  : 1970  Height: 72 8 in  Weight: 222 2 lb  BSA: 2 25 mï¾²    Allergies: NO KNOWN ALLERGIES    Diagnostic Cardiologist:  Vinicio Saul DO  Interventional Cardiologist:  Vinicio Saul DO  Primary Physician:  ELEANOR Zamarripa    SUMMARY    CORONARY CIRCULATION:  There was 1-vessel coronary artery disease  Left main: Normal   1st obtuse marginal: There was a 90 % stenosis  Remainder circumflex nonobstructive    HEMODYNAMICS:  Hemodynamic assessment demonstrated mildly elevated LVEDP      1ST LESION INTERVENTIONS:  A drug-eluting stent with balloon angioplasty procedure was performed on the 90 % lesion in the 1st obtuse marginal  Following intervention there was an excellent angiographic appearance with a 0 % residual stenosis  A Xience Alba Rx 3 25 x 15mm drug-eluting stent was placed across the lesion and deployed at a maximum inflation pressure of 14 rich  INDICATIONS:  --  Possible CAD: myocardial infarction without ST elevation (NSTEMI)  PROCEDURES PERFORMED    --  Left heart catheterization without ventriculogram   --  Right coronary angiography  --  Left coronary angiography  --  Inpatient  --  Mod Sedation Same Physician Initial 15min  --  Mod Sedation Same Physician Add 15min  --  Coronary Catheterization (w/ LHC)  --  Coronary Drug Eluting Stent w/PTCA  --  Intervention on OM1: drug-eluting stent, balloon angioplasty  PROCEDURE: The risks and alternatives of the procedures and conscious sedation were explained to the patient and informed consent was obtained  The patient was brought to the cath lab and placed on the table  The planned puncture sites  were prepped and draped in the usual sterile fashion  --  Right radial artery access  After performing an Juliano's test to verify adequate ulnar artery supply to the hand, the radial site was prepped  The puncture site was infiltrated with local anesthetic  The vessel was accessed using the  modified Seldinger technique, a wire was advanced into the vessel, and a sheath was advanced over the wire into the vessel  --  Left heart catheterization without ventriculogram  A catheter was advanced over a guidewire into the ascending aorta  After recording ascending aortic pressure, the catheter was advanced across the aortic valve and left ventricular  pressure was recorded  The catheter was pulled back across the aortic valve and into the ascending aorta and pullback pressures were obtained  --  Right coronary artery angiography   A catheter was advanced over a guidewire into the aorta and positioned in the right coronary artery ostium under fluoroscopic guidance  Angiography was performed  --  Left coronary artery angiography  A catheter was advanced over a guidewire into the aorta and positioned in the left coronary artery ostium under fluoroscopic guidance  Angiography was performed  --  Inpatient  --  Mod Sedation Same Physician Initial 15min  --  Mod Sedation Same Physician Add 15min  --  Coronary Catheterization (w/ LHC)  LESION INTERVENTION: A drug-eluting stent with balloon angioplasty procedure was performed on the 90 % lesion in the 1st obtuse marginal  Following intervention there was an excellent angiographic appearance with a 0 % residual stenosis  This was an ACC/AHA type B "moderate risk" lesion for intervention  There was SUSAN 3 flow before the procedure and SUSAN 3 flow after the procedure  There was no dissection  --  Vessel setup was performed  A Launcher 6Fr Ebu 3 5 guiding catheter was used to cannulate the vessel  --  Vessel setup was performed  A BMW   014 190cm wire was used to cross the lesion  --  A Xience Alba Rx 3 25 x 15mm drug-eluting stent was placed across the lesion and deployed at a maximum inflation pressure of 14 rich  --  Balloon angioplasty was performed, using a NC Trek Rx 3 5 x 15mm balloon, with 1 inflations and a maximum inflation pressure of 12 rich  INTERVENTIONS:  --  Coronary Drug Eluting Stent w/PTCA  PROCEDURE COMPLETION: The patient tolerated the procedure well and was discharged from the cath lab  TIMING: Test started at 10:11  Test concluded at 10:38  HEMOSTASIS: The sheath was removed  The site was compressed with a Hemoband  device  Hemostasis was obtained  MEDICATIONS GIVEN: Baby Aspirin, 324 mg, PO, at 10:12  Fentanyl (1OOmcg/2 ml), 50 mcg, IV, at 10:19  Versed (2mg/2ml), 2 mg, IV, at 10:19  1% Lidocaine, 1 ml, subcutaneously, at 10:19  Verapamil (5mg/2ml),  2 5 mg, IV, at 10:19  Heparin 1000 units/ml, 4,000 units, IV, at 10:19  Nitroglycerin (200mcg/ml), 200 mcg, at 10:19  Heparin 1000 units/ml, 6,000 units, IV, at 10:27  CONTRAST GIVEN: 75 ml Omnipaque (350mg I /ml)  RADIATION EXPOSURE:  Fluoroscopy time: 3 7 min  HEMODYNAMICS: Hemodynamic assessment demonstrated mildly elevated LVEDP  CORONARY VESSELS:   --  The coronary circulation is right dominant  --  There was 1-vessel coronary artery disease  --  Left main: Normal   --  LAD: Angiography showed minor luminal irregularities  --  1st obtuse marginal: There was a 90 % stenosis  Remainder circumflex nonobstructive  --  RCA: Angiography showed minor luminal irregularities  IMPRESSIONS:  PCI of OM with RORY    RECOMMENDATIONS  asa 81 indefinitely, plavix x 1 year    DISPOSITION:  The patient left the catheterization laboratory in stable condition  Prepared and signed by    Tamica Silvestre DO  Signed 06/10/2019 10:41:34    Study diagram    Angiographic findings  Native coronary lesions:  ï¾·OM1: Lesion 1: 90 % stenosis  Intervention results  Native coronary lesions:  ï¾·drug-eluting stent and balloon angioplasty of the 90 % stenosis in OM1  Appearance excellent with 0 % residual stenosis  Stent: Rei Juba Rx 3 25 x 15mm drug-eluting  Hemodynamic tables    Pressures:  Baseline  Pressures:  - HR: 70  Pressures:  - Rhythm:  Pressures:  -- Aortic Pressure (S/D/M): 119/35/95  Pressures:  -- Left Ventricle (s/edp): 106/19/--    Outputs:  Baseline  Outputs:  -- CALCULATIONS: [age]  Outputs:  -- CALCULATIONS: Body Surface Area: 2 25  Outputs:  -- CALCULATIONS: Height in cm: 185 00  Outputs:  -- CALCULATIONS: Sex: Male  Outputs:  -- CALCULATIONS: Weight in k 00    --------------------------------------------------------------------------------  ECHO:   Results for orders placed during the hospital encounter of 06/10/19    Echo complete with contrast if indicated    Narrative  [facility]    hospitals, [address]  [phone]    Transthoracic Echocardiogram  2D, M-mode, Doppler, and Color Doppler    Study date:  2019    Patient: Gurjit Banks  MR number: [id number]  Account number: [de-identified]  : 1970  Age: 51 years  Gender: Male  Status: Inpatient  Location: Bedside  Height: 73 in  Weight: 223 lb  BP: 116/ 75 mmHg    Indications: NSTEMI    Diagnoses: I21 4 - Non-ST elevation (NSTEMI) myocardial infarction    Sonographer:  Tiago Mendes  Primary Physician:  ELEANOR Enriquez  Referring Physician:  Ramez Chanel MD  Group:  [facility]  Interpreting Physician:  Patricia Lay MD    SUMMARY    LEFT VENTRICLE:  Normal left ventricular systolic function, EF 90%  Normal left ventricular cavity size  Normal left ventricular wall thickness  Normal left ventricular wall motion without regional wall motion abnormalities  Normal left ventricular  diastolic function  Normal left atrial pressures and left ventricular filling pressures  LEFT ATRIUM:  Mild left atrial enlargement  MITRAL VALVE:  There was trace regurgitation  TRICUSPID VALVE:  There was mild regurgitation  HISTORY: PRIOR HISTORY: NSTEMI, tobacco use  PROCEDURE: The procedure was performed at the bedside  This was a routine study  The transthoracic approach was used  The study included complete 2D imaging, M-mode, complete spectral Doppler, and color Doppler  The heart rate was 54 bpm,  at the start of the study  Images were obtained from the parasternal, apical, subcostal, and suprasternal notch acoustic windows  Echocardiographic views were limited due to high windows and lung interference  Image quality was adequate  LEFT VENTRICLE: Normal left ventricular systolic function, EF 56%  Normal left ventricular cavity size  Normal left ventricular wall thickness  Normal left ventricular wall motion without regional wall motion abnormalities  Normal left  ventricular diastolic function   Normal left atrial pressures and left ventricular filling pressures  RIGHT VENTRICLE: The size was normal  Systolic function was normal  Wall thickness was normal     LEFT ATRIUM: Mild left atrial enlargement  RIGHT ATRIUM: Size was normal     MITRAL VALVE: Valve structure was normal  There was normal leaflet separation  DOPPLER: The transmitral velocity was within the normal range  There was no evidence for stenosis  There was trace regurgitation  AORTIC VALVE: The valve was trileaflet  Leaflets exhibited normal thickness and normal cuspal separation  DOPPLER: Transaortic velocity was within the normal range  There was no evidence for stenosis  There was no regurgitation  TRICUSPID VALVE: The valve structure was normal  There was normal leaflet separation  DOPPLER: The transtricuspid velocity was within the normal range  There was no evidence for stenosis  There was mild regurgitation  PULMONIC VALVE: DOPPLER: The transpulmonic velocity was within the normal range  There was no regurgitation  PERICARDIUM: There was no pericardial effusion  The pericardium was normal in appearance  AORTA: The root exhibited normal size  PULMONARY ARTERY: The size was normal  DOPPLER: Systolic pressure was within the normal range      SYSTEM MEASUREMENT TABLES    2D  %FS: 37 3 %  Ao Diam: 2 9 cm  EDV(Teich): 137 8 ml  EF(Teich): 66 8 %  ESV(Teich): 45 7 ml  IVSd: 0 9 cm  LA Area: 21 1 cm2  LA Diam: 4 1 cm  LVEDV MOD A4C: 135 2 ml  LVEF MOD A4C: 57 9 %  LVESV MOD A4C: 56 9 ml  LVIDd: 5 3 cm  LVIDs: 3 3 cm  LVLd A4C: 8 8 cm  LVLs A4C: 7 5 cm  LVPWd: 0 9 cm  RA Area: 14 9 cm2  RVIDd: 2 9 cm  SV MOD A4C: 78 3 ml  SV(Teich): 92 1 ml    CW  AV Vmax: 1 4 m/s  AV maxP 9 mmHg  TR Vmax: 2 2 m/s  TR maxP 9 mmHg    MM  TAPSE: 2 cm    PW  E': 0 1 m/s  E/E': 10 5  MV A Eldon: 0 5 m/s  MV Dec Prentiss: 5 1 m/s2  MV DecT: 157 6 ms  MV E Eldon: 0 8 m/s  MV E/A Ratio: 1 5  MV PHT: 45 7 ms  MVA By PHT: 4 8 cm2    [facility] Accredited Echocardiography Laboratory    Prepared and electronically signed by    Michelle Cheatham MD  Signed 11-Jun-2019 16:36:31    No results found for this or any previous visit     --------------------------------------------------------------------------------  HOLTER  No results found for this or any previous visit  No results found for this or any previous visit     --------------------------------------------------------------------------------  CAROTIDS  No results found for this or any previous visit      --------------------------------------------------------------------------------  Diagnoses and all orders for this visit:    History of non-ST elevation myocardial infarction (NSTEMI)  -     nitroglycerin (NITROSTAT) 0 4 mg SL tablet; Place 1 tablet (0 4 mg total) under the tongue every 5 (five) minutes as needed for chest pain    Tobacco abuse    Hypercholesterolemia  -     atorvastatin (LIPITOR) 80 mg tablet; Take 1 tablet (80 mg total) by mouth daily with dinner    Coronary artery disease involving native coronary artery of native heart without angina pectoris  -     Lipid Panel with Direct LDL reflex; Future    NSTEMI (non-ST elevated myocardial infarction) ([facility])  -     aspirin 81 mg chewable tablet; Chew 1 tablet (81 mg total) daily  -     metoprolol succinate (TOPROL-XL) 25 mg 24 hr tablet;  Take 1 tablet (25 mg total) by mouth daily    Chest pain, unspecified type  -     nitroglycerin (NITROSTAT) 0 4 mg SL tablet; Place 1 tablet (0 4 mg total) under the tongue every 5 (five) minutes as needed for chest pain       ======================================================    Past Medical History:   Diagnosis Date    Hyperlipidemia     Hypertension     MI (myocardial infarction) ([address] )      Past Surgical History:   Procedure Laterality Date    CORONARY ANGIOPLASTY WITH STENT PLACEMENT      HAND SURGERY           Medications  Current Outpatient Medications   Medication Sig Dispense Refill    aspirin 81 mg chewable tablet Chew 1 tablet (81 mg total) daily 90 tablet 3    atorvastatin (LIPITOR) 80 mg tablet Take 1 tablet (80 mg total) by mouth daily with dinner 90 tablet 3    cyclobenzaprine (FLEXERIL) 10 mg tablet Take 1 tablet (10 mg total) by mouth 2 (two) times a day as needed for muscle spasms 60 tablet 1    metoprolol succinate (TOPROL-XL) 25 mg 24 hr tablet Take 1 tablet (25 mg total) by mouth daily 90 tablet 3    naproxen (NAPROSYN) 500 mg tablet Take 1 tablet (500 mg total) by mouth 2 (two) times a day with meals 60 tablet 5    nitroglycerin (NITROSTAT) 0 4 mg SL tablet Place 1 tablet (0 4 mg total) under the tongue every 5 (five) minutes as needed for chest pain 25 tablet 3    Blood Pressure Monitoring KIT by Does not apply route 2 (two) times a day (Patient not taking: Reported on 8/4/2021) 1 kit 0    Diclofenac Sodium (VOLTAREN) 1 % Apply 2 g topically 4 (four) times a day (Patient not taking: Reported on 8/4/2021) 100 g 5    diclofenac sodium (VOLTAREN) 50 mg EC tablet Take 1 tablet (50 mg total) by mouth 2 (two) times a day 60 tablet 1     No current facility-administered medications for this visit          No Known Allergies    Social History     Socioeconomic History    Marital status: /Civil Union     Spouse name: Not on file    Number of children: Not on file    Years of education: Not on file    Highest education level: Not on file   Occupational History    Not on file   Tobacco Use    Smoking status: Current Every Day Smoker     Packs/day: 1 00     Types: Cigarettes    Smokeless tobacco: Never Used   Vaping Use    Vaping Use: Never used   Substance and Sexual Activity    Alcohol use: Yes     Comment: occasional    Drug use: Not Currently    Sexual activity: Yes     Partners: Female   Other Topics Concern    Not on file   Social History Narrative    Not on file     Social Determinants of Health     Financial Resource Strain:     Difficulty of Paying Living Expenses:    Food Insecurity:  Worried About [facility] in the Last Year:    [address] in the Last Year:    Transportation Needs:     Lack of Transportation (Medical):      Lack of Transportation (Non-Medical):    Physical Activity:     Days of Exercise per Week:     Minutes of Exercise per Session:    Stress:     Feeling of Stress :    Social Connections:     Frequency of Communication with Friends and Family:     Frequency of Social Gatherings with Friends and Family:     Attends [employer] Services:     Active Member of Clubs or Organizations:     Attends Club or Organization Meetings:     Marital Status:    Intimate Partner Violence:     Fear of Current or Ex-Partner:     Emotionally Abused:     Physically Abused:     Sexually Abused:         Family History   Problem Relation Age of Onset    No Known Problems Mother     No Known Problems Father        Lab Results   Component Value Date    WBC 9 42 01/24/2021    HGB 15 7 01/24/2021    HCT 47 2 01/24/2021    MCV 97 01/24/2021     01/24/2021      Lab Results   Component Value Date    SODIUM 139 01/24/2021    K 3 8 01/24/2021     01/24/2021    CO2 25 01/24/2021    BUN 19 01/24/2021    CREATININE 1 26 01/24/2021    GLUC 92 01/24/2021    CALCIUM 8 9 01/24/2021      Lab Results   Component Value Date    HGBA1C 5 4 09/10/2019      Lab Results   Component Value Date    CHOL 225 10/17/2014     Lab Results   Component Value Date    HDL 36 (L) 09/10/2019    HDL 34 (L) 06/10/2019    HDL 31 10/17/2014     Lab Results   Component Value Date    LDLCALC 87 09/10/2019    LDLCALC 158 (H) 06/10/2019    LDLCALC 162 (H) 10/17/2014     Lab Results   Component Value Date    TRIG 93 09/10/2019    TRIG 157 (H) 06/10/2019    TRIG 161 10/17/2014     No results found for: CHOLHDL   Lab Results   Component Value Date    INR 1 05 01/24/2021    INR 1 06 04/12/2020    INR 1 03 06/09/2019    PROTIME 13 5 01/24/2021    PROTIME 13 8 04/12/2020    PROTIME 13 0 06/09/2019 Patient Active Problem List    Diagnosis Date Noted    Chronic pain syndrome 07/23/2021    Thoracic disc herniation 07/23/2021    Thoracic radiculopathy 07/23/2021    Chest pain 01/24/2021    Prediabetes 06/17/2019    Hypercholesterolemia 06/11/2019    History of non-ST elevation myocardial infarction (NSTEMI) 06/10/2019    Tobacco abuse 06/10/2019    Blurry vision, bilateral 10/10/2014    Fatigue 10/10/2014    Overweight 10/10/2014       Portions of the record may have been created with voice recognition software  Occasional wrong word or "sound a like" substitutions may have occurred due to the inherent limitations of voice recognition software  Read the chart carefully and recognize, using context, where substitutions have occurred      Chivo Fernandez DO, [facility] [place]  8/4/2021 4:15 PM

## 2021-09-28 ENCOUNTER — HOSPITAL ENCOUNTER (EMERGENCY)
Facility: HOSPITAL | Age: 51
Discharge: HOME | End: 2021-09-28
Attending: EMERGENCY MEDICINE
Payer: COMMERCIAL

## 2021-09-28 ENCOUNTER — APPOINTMENT (EMERGENCY)
Dept: CT IMAGING | Facility: HOSPITAL | Age: 51
End: 2021-09-28
Payer: COMMERCIAL

## 2021-09-28 ENCOUNTER — APPOINTMENT (EMERGENCY)
Dept: RADIOLOGY | Facility: HOSPITAL | Age: 51
End: 2021-09-28
Payer: COMMERCIAL

## 2021-09-28 VITALS
HEART RATE: 59 BPM | RESPIRATION RATE: 16 BRPM | TEMPERATURE: 98.2 F | BODY MASS INDEX: 30.39 KG/M2 | HEIGHT: 73 IN | DIASTOLIC BLOOD PRESSURE: 75 MMHG | SYSTOLIC BLOOD PRESSURE: 155 MMHG | WEIGHT: 229.28 LBS | OXYGEN SATURATION: 96 %

## 2021-09-28 DIAGNOSIS — R07.89 ATYPICAL CHEST PAIN: Primary | ICD-10-CM

## 2021-09-28 DIAGNOSIS — R42 DIZZINESS: ICD-10-CM

## 2021-09-28 LAB
ANION GAP SERPL CALCULATED.3IONS-SCNC: 8 MMOL/L (ref 4–13)
BASOPHILS # BLD AUTO: 0.05 THOUSANDS/ΜL (ref 0–0.1)
BASOPHILS NFR BLD AUTO: 1 % (ref 0–1)
BUN SERPL-MCNC: 18 MG/DL (ref 5–25)
CALCIUM SERPL-MCNC: 9.1 MG/DL (ref 8.3–10.1)
CHLORIDE SERPL-SCNC: 108 MMOL/L (ref 100–108)
CO2 SERPL-SCNC: 25 MMOL/L (ref 21–32)
CREAT SERPL-MCNC: 1.36 MG/DL (ref 0.6–1.3)
EOSINOPHIL # BLD AUTO: 0.21 THOUSAND/ΜL (ref 0–0.61)
EOSINOPHIL NFR BLD AUTO: 3 % (ref 0–6)
ERYTHROCYTE [DISTWIDTH] IN BLOOD BY AUTOMATED COUNT: 13.7 % (ref 11.6–15.1)
GFR SERPL CREATININE-BSD FRML MDRD: 60 ML/MIN/1.73SQ M
GLUCOSE SERPL-MCNC: 104 MG/DL (ref 65–140)
HCT VFR BLD AUTO: 49 % (ref 36.5–49.3)
HGB BLD-MCNC: 16 G/DL (ref 12–17)
IMM GRANULOCYTES # BLD AUTO: 0.02 THOUSAND/UL (ref 0–0.2)
IMM GRANULOCYTES NFR BLD AUTO: 0 % (ref 0–2)
LYMPHOCYTES # BLD AUTO: 2.6 THOUSANDS/ΜL (ref 0.6–4.47)
LYMPHOCYTES NFR BLD AUTO: 34 % (ref 14–44)
MCH RBC QN AUTO: 31.5 PG (ref 26.8–34.3)
MCHC RBC AUTO-ENTMCNC: 32.7 G/DL (ref 31.4–37.4)
MCV RBC AUTO: 97 FL (ref 82–98)
MONOCYTES # BLD AUTO: 0.76 THOUSAND/ΜL (ref 0.17–1.22)
MONOCYTES NFR BLD AUTO: 10 % (ref 4–12)
NEUTROPHILS # BLD AUTO: 4.11 THOUSANDS/ΜL (ref 1.85–7.62)
NEUTS SEG NFR BLD AUTO: 52 % (ref 43–75)
NRBC BLD AUTO-RTO: 0 /100 WBCS
PLATELET # BLD AUTO: 190 THOUSANDS/UL (ref 149–390)
PMV BLD AUTO: 10.4 FL (ref 8.9–12.7)
POTASSIUM SERPL-SCNC: 3.8 MMOL/L (ref 3.5–5.3)
RBC # BLD AUTO: 5.08 MILLION/UL (ref 3.88–5.62)
SODIUM SERPL-SCNC: 141 MMOL/L (ref 136–145)
TROPONIN I SERPL-MCNC: <0.02 NG/ML
WBC # BLD AUTO: 7.75 THOUSAND/UL (ref 4.31–10.16)

## 2021-09-28 PROCEDURE — 70498 CT ANGIOGRAPHY NECK: CPT

## 2021-09-28 PROCEDURE — 71045 X-RAY EXAM CHEST 1 VIEW: CPT

## 2021-09-28 PROCEDURE — 93005 ELECTROCARDIOGRAM TRACING: CPT

## 2021-09-28 PROCEDURE — 85025 COMPLETE CBC W/AUTO DIFF WBC: CPT | Performed by: EMERGENCY MEDICINE

## 2021-09-28 PROCEDURE — 70496 CT ANGIOGRAPHY HEAD: CPT

## 2021-09-28 PROCEDURE — 99285 EMERGENCY DEPT VISIT HI MDM: CPT

## 2021-09-28 PROCEDURE — 80048 BASIC METABOLIC PNL TOTAL CA: CPT | Performed by: EMERGENCY MEDICINE

## 2021-09-28 PROCEDURE — 84484 ASSAY OF TROPONIN QUANT: CPT | Performed by: EMERGENCY MEDICINE

## 2021-09-28 PROCEDURE — 36415 COLL VENOUS BLD VENIPUNCTURE: CPT | Performed by: EMERGENCY MEDICINE

## 2021-09-28 PROCEDURE — 99285 EMERGENCY DEPT VISIT HI MDM: CPT | Performed by: EMERGENCY MEDICINE

## 2021-09-28 RX ORDER — SODIUM CHLORIDE 9 MG/ML
3 INJECTION INTRAVENOUS
Status: DISCONTINUED | OUTPATIENT
Start: 2021-09-28 | End: 2021-09-29 | Stop reason: HOSPADM

## 2021-09-28 RX ORDER — MAGNESIUM HYDROXIDE/ALUMINUM HYDROXICE/SIMETHICONE 120; 1200; 1200 MG/30ML; MG/30ML; MG/30ML
30 SUSPENSION ORAL ONCE
Status: COMPLETED | OUTPATIENT
Start: 2021-09-28 | End: 2021-09-28

## 2021-09-28 RX ORDER — ASPIRIN 81 MG/1
324 TABLET, CHEWABLE ORAL ONCE
Status: COMPLETED | OUTPATIENT
Start: 2021-09-28 | End: 2021-09-28

## 2021-09-28 RX ORDER — MECLIZINE HCL 12.5 MG/1
25 TABLET ORAL ONCE
Status: COMPLETED | OUTPATIENT
Start: 2021-09-28 | End: 2021-09-28

## 2021-09-28 RX ORDER — MECLIZINE HYDROCHLORIDE 25 MG/1
25 TABLET ORAL 3 TIMES DAILY PRN
Qty: 30 TABLET | Refills: 0 | OUTPATIENT
Start: 2021-09-28 | End: 2021-11-04

## 2021-09-28 RX ADMIN — MECLIZINE 25 MG: 12.5 TABLET ORAL at 20:47

## 2021-09-28 RX ADMIN — IOHEXOL 85 ML: 350 INJECTION, SOLUTION INTRAVENOUS at 22:04

## 2021-09-28 RX ADMIN — ALUMINA, MAGNESIA, AND SIMETHICONE ORAL SUSPENSION REGULAR STRENGTH 30 ML: 1200; 1200; 120 SUSPENSION ORAL at 20:47

## 2021-09-28 RX ADMIN — ASPIRIN 81 MG CHEWABLE TABLET 324 MG: 81 TABLET CHEWABLE at 20:47

## 2021-09-29 LAB
ATRIAL RATE: 73 BPM
P AXIS: 63 DEGREES
PR INTERVAL: 148 MS
QRS AXIS: 65 DEGREES
QRSD INTERVAL: 100 MS
QT INTERVAL: 422 MS
QTC INTERVAL: 464 MS
T WAVE AXIS: 53 DEGREES
VENTRICULAR RATE: 73 BPM

## 2021-09-29 PROCEDURE — 93010 ELECTROCARDIOGRAM REPORT: CPT | Performed by: INTERNAL MEDICINE

## 2021-10-27 ENCOUNTER — TELEPHONE (OUTPATIENT)
Dept: CARDIOLOGY CLINIC | Facility: CLINIC | Age: 51
End: 2021-10-27

## 2021-11-04 ENCOUNTER — APPOINTMENT (EMERGENCY)
Dept: RADIOLOGY | Facility: HOSPITAL | Age: 51
End: 2021-11-04
Payer: COMMERCIAL

## 2021-11-04 ENCOUNTER — HOSPITAL ENCOUNTER (EMERGENCY)
Facility: HOSPITAL | Age: 51
Discharge: HOME | End: 2021-11-04
Attending: EMERGENCY MEDICINE
Payer: COMMERCIAL

## 2021-11-04 VITALS
RESPIRATION RATE: 15 BRPM | SYSTOLIC BLOOD PRESSURE: 132 MMHG | TEMPERATURE: 98 F | OXYGEN SATURATION: 99 % | HEART RATE: 80 BPM | DIASTOLIC BLOOD PRESSURE: 80 MMHG

## 2021-11-04 DIAGNOSIS — R07.89 LEFT-SIDED CHEST WALL PAIN: Primary | ICD-10-CM

## 2021-11-04 DIAGNOSIS — R42 DIZZINESS: ICD-10-CM

## 2021-11-04 LAB
ANION GAP SERPL CALCULATED.3IONS-SCNC: 7 MMOL/L (ref 4–13)
BASOPHILS # BLD AUTO: 0.05 THOUSANDS/ΜL (ref 0–0.1)
BASOPHILS NFR BLD AUTO: 1 % (ref 0–1)
BUN SERPL-MCNC: 18 MG/DL (ref 5–25)
CALCIUM SERPL-MCNC: 8.9 MG/DL (ref 8.3–10.1)
CHLORIDE SERPL-SCNC: 105 MMOL/L (ref 100–108)
CO2 SERPL-SCNC: 27 MMOL/L (ref 21–32)
CREAT SERPL-MCNC: 1.25 MG/DL (ref 0.6–1.3)
EOSINOPHIL # BLD AUTO: 0.05 THOUSAND/ΜL (ref 0–0.61)
EOSINOPHIL NFR BLD AUTO: 1 % (ref 0–6)
ERYTHROCYTE [DISTWIDTH] IN BLOOD BY AUTOMATED COUNT: 13.3 % (ref 11.6–15.1)
GFR SERPL CREATININE-BSD FRML MDRD: 66 ML/MIN/1.73SQ M
GLUCOSE SERPL-MCNC: 102 MG/DL (ref 65–140)
HCT VFR BLD AUTO: 52.1 % (ref 36.5–49.3)
HGB BLD-MCNC: 16.5 G/DL (ref 12–17)
IMM GRANULOCYTES # BLD AUTO: 0.03 THOUSAND/UL (ref 0–0.2)
IMM GRANULOCYTES NFR BLD AUTO: 0 % (ref 0–2)
LYMPHOCYTES # BLD AUTO: 2.11 THOUSANDS/ΜL (ref 0.6–4.47)
LYMPHOCYTES NFR BLD AUTO: 23 % (ref 14–44)
MCH RBC QN AUTO: 30.9 PG (ref 26.8–34.3)
MCHC RBC AUTO-ENTMCNC: 31.7 G/DL (ref 31.4–37.4)
MCV RBC AUTO: 98 FL (ref 82–98)
MONOCYTES # BLD AUTO: 0.46 THOUSAND/ΜL (ref 0.17–1.22)
MONOCYTES NFR BLD AUTO: 5 % (ref 4–12)
NEUTROPHILS # BLD AUTO: 6.45 THOUSANDS/ΜL (ref 1.85–7.62)
NEUTS SEG NFR BLD AUTO: 70 % (ref 43–75)
NRBC BLD AUTO-RTO: 0 /100 WBCS
PLATELET # BLD AUTO: 214 THOUSANDS/UL (ref 149–390)
PMV BLD AUTO: 10.2 FL (ref 8.9–12.7)
POTASSIUM SERPL-SCNC: 5 MMOL/L (ref 3.5–5.3)
RBC # BLD AUTO: 5.34 MILLION/UL (ref 3.88–5.62)
SODIUM SERPL-SCNC: 139 MMOL/L (ref 136–145)
TROPONIN I SERPL-MCNC: <0.02 NG/ML
WBC # BLD AUTO: 9.15 THOUSAND/UL (ref 4.31–10.16)

## 2021-11-04 PROCEDURE — 96360 HYDRATION IV INFUSION INIT: CPT

## 2021-11-04 PROCEDURE — 99285 EMERGENCY DEPT VISIT HI MDM: CPT | Performed by: EMERGENCY MEDICINE

## 2021-11-04 PROCEDURE — 80048 BASIC METABOLIC PNL TOTAL CA: CPT | Performed by: EMERGENCY MEDICINE

## 2021-11-04 PROCEDURE — 36415 COLL VENOUS BLD VENIPUNCTURE: CPT | Performed by: EMERGENCY MEDICINE

## 2021-11-04 PROCEDURE — 93005 ELECTROCARDIOGRAM TRACING: CPT

## 2021-11-04 PROCEDURE — 99284 EMERGENCY DEPT VISIT MOD MDM: CPT

## 2021-11-04 PROCEDURE — 71045 X-RAY EXAM CHEST 1 VIEW: CPT

## 2021-11-04 PROCEDURE — 84484 ASSAY OF TROPONIN QUANT: CPT | Performed by: EMERGENCY MEDICINE

## 2021-11-04 PROCEDURE — 85025 COMPLETE CBC W/AUTO DIFF WBC: CPT | Performed by: EMERGENCY MEDICINE

## 2021-11-04 RX ORDER — MECLIZINE HCL 12.5 MG/1
25 TABLET ORAL ONCE
Status: COMPLETED | OUTPATIENT
Start: 2021-11-04 | End: 2021-11-04

## 2021-11-04 RX ORDER — METHOCARBAMOL 500 MG/1
500 TABLET, FILM COATED ORAL ONCE
Status: COMPLETED | OUTPATIENT
Start: 2021-11-04 | End: 2021-11-04

## 2021-11-04 RX ORDER — METHOCARBAMOL 500 MG/1
500 TABLET, FILM COATED ORAL 2 TIMES DAILY
Qty: 20 TABLET | Refills: 0 | Status: SHIPPED | OUTPATIENT
Start: 2021-11-04 | End: 2022-02-08 | Stop reason: SDUPTHER

## 2021-11-04 RX ORDER — IBUPROFEN 600 MG/1
1 TABLET ORAL EVERY 6 HOURS PRN
COMMUNITY
Start: 2021-10-12 | End: 2022-02-08

## 2021-11-04 RX ORDER — MECLIZINE HYDROCHLORIDE 25 MG/1
25 TABLET ORAL 3 TIMES DAILY PRN
Qty: 30 TABLET | Refills: 0 | Status: SHIPPED | OUTPATIENT
Start: 2021-11-04 | End: 2022-02-08 | Stop reason: SDUPTHER

## 2021-11-04 RX ORDER — NAPROXEN 375 MG/1
375 TABLET ORAL 2 TIMES DAILY WITH MEALS
Qty: 20 TABLET | Refills: 0 | Status: SHIPPED | OUTPATIENT
Start: 2021-11-04

## 2021-11-04 RX ADMIN — METHOCARBAMOL 500 MG: 500 TABLET ORAL at 11:40

## 2021-11-04 RX ADMIN — SODIUM CHLORIDE 1000 ML: 0.9 INJECTION, SOLUTION INTRAVENOUS at 11:48

## 2021-11-04 RX ADMIN — MECLIZINE 25 MG: 12.5 TABLET ORAL at 11:40

## 2021-11-05 LAB
ATRIAL RATE: 62 BPM
P AXIS: 55 DEGREES
PR INTERVAL: 150 MS
QRS AXIS: 34 DEGREES
QRSD INTERVAL: 94 MS
QT INTERVAL: 426 MS
QTC INTERVAL: 432 MS
T WAVE AXIS: 41 DEGREES
VENTRICULAR RATE: 62 BPM

## 2021-11-05 PROCEDURE — 93010 ELECTROCARDIOGRAM REPORT: CPT | Performed by: INTERNAL MEDICINE

## 2021-12-13 ENCOUNTER — VBI (OUTPATIENT)
Dept: ADMINISTRATIVE | Facility: OTHER | Age: 51
End: 2021-12-13

## 2022-02-01 ENCOUNTER — TELEPHONE (OUTPATIENT)
Dept: FAMILY MEDICINE CLINIC | Facility: CLINIC | Age: 52
End: 2022-02-01

## 2022-02-08 ENCOUNTER — OFFICE VISIT (OUTPATIENT)
Dept: CARDIOLOGY CLINIC | Facility: CLINIC | Age: 52
End: 2022-02-08
Payer: COMMERCIAL

## 2022-02-08 ENCOUNTER — OFFICE VISIT (OUTPATIENT)
Dept: FAMILY MEDICINE CLINIC | Facility: CLINIC | Age: 52
End: 2022-02-08
Payer: COMMERCIAL

## 2022-02-08 VITALS
DIASTOLIC BLOOD PRESSURE: 88 MMHG | TEMPERATURE: 97.9 F | BODY MASS INDEX: 29.93 KG/M2 | OXYGEN SATURATION: 99 % | HEART RATE: 83 BPM | SYSTOLIC BLOOD PRESSURE: 132 MMHG | HEIGHT: 73 IN | WEIGHT: 225.8 LBS | RESPIRATION RATE: 18 BRPM

## 2022-02-08 VITALS
SYSTOLIC BLOOD PRESSURE: 98 MMHG | WEIGHT: 226.6 LBS | DIASTOLIC BLOOD PRESSURE: 72 MMHG | BODY MASS INDEX: 30.03 KG/M2 | HEART RATE: 62 BPM | HEIGHT: 73 IN

## 2022-02-08 DIAGNOSIS — E78.00 HYPERCHOLESTEROLEMIA: ICD-10-CM

## 2022-02-08 DIAGNOSIS — Z23 ENCOUNTER FOR IMMUNIZATION: ICD-10-CM

## 2022-02-08 DIAGNOSIS — I25.2 HISTORY OF NON-ST ELEVATION MYOCARDIAL INFARCTION (NSTEMI): ICD-10-CM

## 2022-02-08 DIAGNOSIS — M51.24 THORACIC DISC HERNIATION: ICD-10-CM

## 2022-02-08 DIAGNOSIS — I38 VALVULAR HEART DISEASE: ICD-10-CM

## 2022-02-08 DIAGNOSIS — I25.10 CORONARY ARTERY DISEASE INVOLVING NATIVE CORONARY ARTERY OF NATIVE HEART WITHOUT ANGINA PECTORIS: Primary | ICD-10-CM

## 2022-02-08 DIAGNOSIS — E66.3 OVERWEIGHT (BMI 25.0-29.9): ICD-10-CM

## 2022-02-08 DIAGNOSIS — M79.602 LEFT ARM PAIN: Primary | ICD-10-CM

## 2022-02-08 DIAGNOSIS — G89.29 CHRONIC BILATERAL THORACIC BACK PAIN: ICD-10-CM

## 2022-02-08 DIAGNOSIS — R07.9 CHEST PAIN, UNSPECIFIED TYPE: ICD-10-CM

## 2022-02-08 DIAGNOSIS — I25.10 CORONARY ARTERY DISEASE INVOLVING NATIVE CORONARY ARTERY OF NATIVE HEART WITHOUT ANGINA PECTORIS: ICD-10-CM

## 2022-02-08 DIAGNOSIS — R42 DIZZINESS: ICD-10-CM

## 2022-02-08 DIAGNOSIS — M54.6 CHRONIC BILATERAL THORACIC BACK PAIN: ICD-10-CM

## 2022-02-08 DIAGNOSIS — G89.4 CHRONIC PAIN SYNDROME: ICD-10-CM

## 2022-02-08 DIAGNOSIS — M54.14 THORACIC RADICULOPATHY: ICD-10-CM

## 2022-02-08 DIAGNOSIS — Z11.59 ENCOUNTER FOR HEPATITIS C SCREENING TEST FOR LOW RISK PATIENT: ICD-10-CM

## 2022-02-08 DIAGNOSIS — Z12.11 SCREENING FOR COLON CANCER: ICD-10-CM

## 2022-02-08 DIAGNOSIS — Z72.0 TOBACCO ABUSE: ICD-10-CM

## 2022-02-08 DIAGNOSIS — Z11.4 SCREENING FOR HIV (HUMAN IMMUNODEFICIENCY VIRUS): ICD-10-CM

## 2022-02-08 LAB
ALBUMIN SERPL BCP-MCNC: 3.8 G/DL (ref 3.5–5)
ALP SERPL-CCNC: 148 U/L (ref 46–116)
ALT SERPL W P-5'-P-CCNC: 38 U/L (ref 12–78)
ANION GAP SERPL CALCULATED.3IONS-SCNC: 6 MMOL/L (ref 4–13)
AST SERPL W P-5'-P-CCNC: 17 U/L (ref 5–45)
BASOPHILS # BLD AUTO: 0.04 THOUSANDS/ΜL (ref 0–0.1)
BASOPHILS NFR BLD AUTO: 1 % (ref 0–1)
BILIRUB SERPL-MCNC: 0.4 MG/DL (ref 0.2–1)
BUN SERPL-MCNC: 15 MG/DL (ref 5–25)
CALCIUM SERPL-MCNC: 9.2 MG/DL (ref 8.3–10.1)
CHLORIDE SERPL-SCNC: 110 MMOL/L (ref 100–108)
CHOLEST SERPL-MCNC: 164 MG/DL
CO2 SERPL-SCNC: 22 MMOL/L (ref 21–32)
CREAT SERPL-MCNC: 1.1 MG/DL (ref 0.6–1.3)
EOSINOPHIL # BLD AUTO: 0.07 THOUSAND/ΜL (ref 0–0.61)
EOSINOPHIL NFR BLD AUTO: 1 % (ref 0–6)
ERYTHROCYTE [DISTWIDTH] IN BLOOD BY AUTOMATED COUNT: 13.4 % (ref 11.6–15.1)
GFR SERPL CREATININE-BSD FRML MDRD: 76 ML/MIN/1.73SQ M
GLUCOSE P FAST SERPL-MCNC: 130 MG/DL (ref 65–99)
HCT VFR BLD AUTO: 50.8 % (ref 36.5–49.3)
HCV AB SER QL: NORMAL
HDLC SERPL-MCNC: 40 MG/DL
HGB BLD-MCNC: 16.3 G/DL (ref 12–17)
IMM GRANULOCYTES # BLD AUTO: 0.02 THOUSAND/UL (ref 0–0.2)
IMM GRANULOCYTES NFR BLD AUTO: 0 % (ref 0–2)
LDLC SERPL CALC-MCNC: 99 MG/DL (ref 0–100)
LYMPHOCYTES # BLD AUTO: 2.44 THOUSANDS/ΜL (ref 0.6–4.47)
LYMPHOCYTES NFR BLD AUTO: 35 % (ref 14–44)
MCH RBC QN AUTO: 30.8 PG (ref 26.8–34.3)
MCHC RBC AUTO-ENTMCNC: 32.1 G/DL (ref 31.4–37.4)
MCV RBC AUTO: 96 FL (ref 82–98)
MONOCYTES # BLD AUTO: 0.33 THOUSAND/ΜL (ref 0.17–1.22)
MONOCYTES NFR BLD AUTO: 5 % (ref 4–12)
NEUTROPHILS # BLD AUTO: 4.1 THOUSANDS/ΜL (ref 1.85–7.62)
NEUTS SEG NFR BLD AUTO: 58 % (ref 43–75)
NRBC BLD AUTO-RTO: 0 /100 WBCS
PLATELET # BLD AUTO: 219 THOUSANDS/UL (ref 149–390)
PMV BLD AUTO: 10.6 FL (ref 8.9–12.7)
POTASSIUM SERPL-SCNC: 4.1 MMOL/L (ref 3.5–5.3)
PROT SERPL-MCNC: 7.7 G/DL (ref 6.4–8.2)
RBC # BLD AUTO: 5.29 MILLION/UL (ref 3.88–5.62)
SODIUM SERPL-SCNC: 138 MMOL/L (ref 136–145)
TRIGL SERPL-MCNC: 124 MG/DL
TSH SERPL DL<=0.05 MIU/L-ACNC: 1.54 UIU/ML (ref 0.36–3.74)
WBC # BLD AUTO: 7 THOUSAND/UL (ref 4.31–10.16)

## 2022-02-08 PROCEDURE — 85025 COMPLETE CBC W/AUTO DIFF WBC: CPT | Performed by: PHYSICIAN ASSISTANT

## 2022-02-08 PROCEDURE — 87389 HIV-1 AG W/HIV-1&-2 AB AG IA: CPT | Performed by: PHYSICIAN ASSISTANT

## 2022-02-08 PROCEDURE — 80061 LIPID PANEL: CPT | Performed by: PHYSICIAN ASSISTANT

## 2022-02-08 PROCEDURE — 80053 COMPREHEN METABOLIC PANEL: CPT | Performed by: PHYSICIAN ASSISTANT

## 2022-02-08 PROCEDURE — 84443 ASSAY THYROID STIM HORMONE: CPT | Performed by: PHYSICIAN ASSISTANT

## 2022-02-08 PROCEDURE — 86803 HEPATITIS C AB TEST: CPT | Performed by: PHYSICIAN ASSISTANT

## 2022-02-08 PROCEDURE — 99214 OFFICE O/P EST MOD 30 MIN: CPT | Performed by: FAMILY MEDICINE

## 2022-02-08 PROCEDURE — 83036 HEMOGLOBIN GLYCOSYLATED A1C: CPT | Performed by: PHYSICIAN ASSISTANT

## 2022-02-08 PROCEDURE — 90686 IIV4 VACC NO PRSV 0.5 ML IM: CPT

## 2022-02-08 PROCEDURE — 99406 BEHAV CHNG SMOKING 3-10 MIN: CPT

## 2022-02-08 PROCEDURE — 99214 OFFICE O/P EST MOD 30 MIN: CPT

## 2022-02-08 PROCEDURE — 90471 IMMUNIZATION ADMIN: CPT | Performed by: FAMILY MEDICINE

## 2022-02-08 RX ORDER — ATORVASTATIN CALCIUM 80 MG/1
80 TABLET, FILM COATED ORAL
Qty: 30 TABLET | Refills: 5 | Status: SHIPPED | OUTPATIENT
Start: 2022-02-08

## 2022-02-08 RX ORDER — MECLIZINE HYDROCHLORIDE 25 MG/1
25 TABLET ORAL 3 TIMES DAILY PRN
Qty: 30 TABLET | Refills: 5 | Status: SHIPPED | OUTPATIENT
Start: 2022-02-08

## 2022-02-08 RX ORDER — NITROGLYCERIN 0.4 MG/1
0.4 TABLET SUBLINGUAL
Qty: 25 TABLET | Refills: 3 | Status: SHIPPED | OUTPATIENT
Start: 2022-02-08

## 2022-02-08 RX ORDER — METOPROLOL SUCCINATE 25 MG/1
25 TABLET, EXTENDED RELEASE ORAL DAILY
Qty: 30 TABLET | Refills: 5 | Status: SHIPPED | OUTPATIENT
Start: 2022-02-08

## 2022-02-08 RX ORDER — METHOCARBAMOL 500 MG/1
500 TABLET, FILM COATED ORAL 2 TIMES DAILY PRN
Qty: 20 TABLET | Refills: 0 | Status: SHIPPED | OUTPATIENT
Start: 2022-02-08 | End: 2022-04-27

## 2022-02-08 NOTE — PROGRESS NOTES
Assessment/Plan:     Diagnoses and all orders for this visit:    Left arm pain  -     methocarbamol (ROBAXIN) 500 mg tablet; Take 1 tablet (500 mg total) by mouth 2 (two) times a day as needed for muscle spasms  -     Ambulatory Referral to Orthopedic Surgery; Future    Dizziness  -     meclizine (ANTIVERT) 25 mg tablet; Take 1 tablet (25 mg total) by mouth 3 (three) times a day as needed for dizziness  -     CBC and differential; Future  -     Comprehensive metabolic panel; Future  -     TSH, 3rd generation with Free T4 reflex; Future    Chronic bilateral thoracic back pain  -     Diclofenac Sodium (VOLTAREN) 1 %; Apply 2 g topically 4 (four) times a day  -     methocarbamol (ROBAXIN) 500 mg tablet; Take 1 tablet (500 mg total) by mouth 2 (two) times a day as needed for muscle spasms    Hypercholesterolemia  -     atorvastatin (LIPITOR) 80 mg tablet; Take 1 tablet (80 mg total) by mouth daily with dinner  -     Comprehensive metabolic panel; Future  -     Comprehensive metabolic panel  -     Lipid Panel with Direct LDL reflex    History of non-ST elevation myocardial infarction (NSTEMI)  -     atorvastatin (LIPITOR) 80 mg tablet; Take 1 tablet (80 mg total) by mouth daily with dinner  -     metoprolol succinate (TOPROL-XL) 25 mg 24 hr tablet; Take 1 tablet (25 mg total) by mouth daily  -     CBC and differential; Future  -     TSH, 3rd generation with Free T4 reflex; Future  -     HEMOGLOBIN A1C W/ EAG ESTIMATION; Future  -     CBC and differential  -     TSH, 3rd generation with Free T4 reflex  -     HEMOGLOBIN A1C W/ EAG ESTIMATION    Coronary artery disease involving native coronary artery of native heart without angina pectoris  -     atorvastatin (LIPITOR) 80 mg tablet;  Take 1 tablet (80 mg total) by mouth daily with dinner  -     Lipid Panel with Direct LDL reflex    Screening for HIV (human immunodeficiency virus)  -     [facility]ib[facility]th [facility]; Future  -     HIV 1/2 Antigen/Antibody (4th Generation) w Reflex [facility]    Encounter for hepatitis C screening test for low risk patient  -     Hepatitis C antibody; Future  -     Hepatitis C antibody    Encounter for immunization  -     influenza vaccine, quadrivalent, recombinant, PF, 0 5 mL, for patients 18 yr+ ([id number])    Screening for colon cancer  -     Cologuard      BMI Counseling: Body mass index is 29 79 kg/m²  The BMI is above normal  Nutrition recommendations include decreasing portion sizes, encouraging healthy choices of fruits and vegetables, decreasing fast food intake, consuming healthier snacks, limiting drinks that contain sugar, moderation in carbohydrate intake, increasing intake of lean protein, reducing intake of saturated and trans fat and reducing intake of cholesterol  Exercise recommendations include moderate physical activity 150 minutes/week, vigorous physical activity 75 minutes/week, exercising 3-5 times per week, obtaining a gym membership and strength training exercises  No pharmacotherapy was ordered  Rationale for BMI follow-up plan is due to patient being overweight or obese  Tobacco Cessation Counseling: Tobacco cessation counseling was provided  The patient is sincerely urged to quit consumption of tobacco  He is not ready to quit tobacco  Medication options not discussed  - Continue current medications as prescribed  - Routine labs drawn in the office today  - Referral provided to ortho for follow up of left arm pain  - Screening cologuard ordered    Return in about 6 months (around 8/8/2022) for Next scheduled follow up  Subjective:        Patient ID: Maverick Nobles is a 45 y o  male  Chief Complaint   Patient presents with    Arm Pain    Dizziness       Jadiel Blanco is a 45year old male with history of NSTEMI s/p stent 6/2019, HTN, HLD, tobacco abuse, and chronic thoracic back pain, presenting for follow up  HTN managed with metoprolol 25 mg daily    BP well controlled at 132/88 today  Patient reports some intermittent dizziness with quick head movements, improved with meclizine  HLD managed with atorvastatin 80 mg daily  Last lipid panel from 9/2019 with total cholesterol 192, trig 93, LDL 87  Chronic pain is currently managed with robaxin and diclofenac gel PRN  Today patient endorses left arm pain which has been present for a few months  He notes numbness and tingling in his hand which radiates into his elbow  Pain is worse at his lateral shoulder with abduction  He describes this pain as "something tearing"  He denies falls or injury  He has not been taking anything for the pain but has been doing stretching exercises at home  The following portions of the patient's history were reviewed and updated as appropriate: allergies, current medications, past family history, past medical history, past social history, past surgical history and problem list     Patient Active Problem List   Diagnosis    History of non-ST elevation myocardial infarction (NSTEMI)    Tobacco abuse    Hypercholesterolemia    Blurry vision, bilateral    Fatigue    Overweight (BMI 25 0-29  9)    Prediabetes    Chest pain    Chronic pain syndrome    Thoracic disc herniation    Thoracic radiculopathy    Chronic bilateral thoracic back pain    Dizziness    Coronary artery disease involving native coronary artery of native heart without angina pectoris       Current Outpatient Medications   Medication Sig Dispense Refill    aspirin 81 mg chewable tablet Chew 1 tablet (81 mg total) daily 90 tablet 3    atorvastatin (LIPITOR) 80 mg tablet Take 1 tablet (80 mg total) by mouth daily with dinner 30 tablet 5    Diclofenac Sodium (VOLTAREN) 1 % Apply 2 g topically 4 (four) times a day 100 g 5    ibuprofen (MOTRIN) 600 mg tablet Take 1 tablet by mouth every 6 (six) hours as needed      meclizine (ANTIVERT) 25 mg tablet Take 1 tablet (25 mg total) by mouth 3 (three) times a day as needed for dizziness 30 tablet 5    metoprolol succinate (TOPROL-XL) 25 mg 24 hr tablet Take 1 tablet (25 mg total) by mouth daily 30 tablet 5    naproxen (NAPROSYN) 375 mg tablet Take 1 tablet (375 mg total) by mouth 2 (two) times a day with meals 20 tablet 0    nitroglycerin (NITROSTAT) 0 4 mg SL tablet Place 1 tablet (0 4 mg total) under the tongue every 5 (five) minutes as needed for chest pain 25 tablet 3    Blood Pressure Monitoring KIT by Does not apply route 2 (two) times a day (Patient not taking: Reported on 8/4/2021) 1 kit 0    methocarbamol (ROBAXIN) 500 mg tablet Take 1 tablet (500 mg total) by mouth 2 (two) times a day as needed for muscle spasms 20 tablet 0     No current facility-administered medications for this visit          Past Medical History:   Diagnosis Date    Hyperlipidemia     Hypertension     MI (myocardial infarction) ([facility] [address] )         Past Surgical History:   Procedure Laterality Date    CORONARY ANGIOPLASTY WITH STENT PLACEMENT      HAND SURGERY          Social History     Socioeconomic History    Marital status: /Civil Union     Spouse name: Not on file    Number of children: Not on file    Years of education: Not on file    Highest education level: Not on file   Occupational History    Not on file   Tobacco Use    Smoking status: Current Every Day Smoker     Packs/day: 1 00     Types: Cigarettes    Smokeless tobacco: Never Used   Vaping Use    Vaping Use: Never used   Substance and Sexual Activity    Alcohol use: Yes     Comment: occasional    Drug use: Not Currently    Sexual activity: Yes     Partners: Female   Other Topics Concern    Not on file   Social History Narrative    Not on file     Social Determinants of Health     Financial Resource Strain: Not on file   Food Insecurity: Not on file   Transportation Needs: Not on file   Physical Activity: Not on file   Stress: Not on file   Social Connections: Not on file   Intimate Partner Violence: Not on file   Housing Stability: Not on file        Review of Systems   Constitutional: Negative for chills, diaphoresis and fever  HENT: Negative for congestion, ear pain, postnasal drip, rhinorrhea, sinus pressure and sore throat  Eyes: Negative for photophobia, pain, discharge, redness, itching and visual disturbance  Respiratory: Negative for cough, chest tightness, shortness of breath and wheezing  Cardiovascular: Negative for chest pain, palpitations and leg swelling  Gastrointestinal: Negative for abdominal pain, blood in stool, constipation, diarrhea, nausea and vomiting  Genitourinary: Negative for difficulty urinating, dysuria, frequency, hematuria and urgency  Musculoskeletal: Positive for arthralgias and myalgias  Skin: Negative for rash and wound  Neurological: Positive for dizziness and numbness  Negative for syncope, weakness and headaches  Objective:      /88 (BP Location: Left arm, Patient Position: Sitting, Cuff Size: Adult)   Pulse 83   Temp 97 9 °F (36 6 °C) (Temporal)   Resp 18   Ht 6' 1" (1 854 m)   Wt 102 kg (225 lb 12 8 oz)   SpO2 99%   BMI 29 79 kg/m²          Physical Exam  Vitals and nursing note reviewed  Constitutional:       General: He is not in acute distress  Appearance: Normal appearance  HENT:      Head: Normocephalic and atraumatic  Right Ear: Tympanic membrane, ear canal and external ear normal       Left Ear: Tympanic membrane, ear canal and external ear normal    Eyes:      Extraocular Movements: Extraocular movements intact  Conjunctiva/sclera: Conjunctivae normal       Pupils: Pupils are equal, round, and reactive to light  Cardiovascular:      Rate and Rhythm: Normal rate and regular rhythm  Heart sounds: Normal heart sounds  No murmur heard  Pulmonary:      Effort: Pulmonary effort is normal  No respiratory distress  Breath sounds: Normal breath sounds  No wheezing     Musculoskeletal:      Left shoulder: Tenderness present  Decreased range of motion  Left elbow: Normal  No effusion  Normal range of motion  No tenderness  Left wrist: Normal  No swelling or tenderness  Normal range of motion  Cervical back: Normal range of motion and neck supple  Right lower leg: No edema  Left lower leg: No edema  Skin:     General: Skin is warm and dry  Neurological:      General: No focal deficit present  Mental Status: He is alert and oriented to person, place, and time  Cranial Nerves: No cranial nerve deficit     Psychiatric:         Mood and Affect: Mood normal          Behavior: Behavior normal

## 2022-02-08 NOTE — PROGRESS NOTES
Cardiology   MD Tiago Lucero MD Mayford Martinez, DO, [facility] MD Haven Stacy DO, Rhonda You DO, [facility] [place]  -------------------------------------------------------------------  [facility]  [address] [place] [id number]  [phone]  [phone]  02/08/22  Pilo Sifuentes  YOB: 1970   MRN: [id number]      Referring Physician: BRADLEY Saenz [address] [place] [address] Drive, [address]     HPI: Pilo Sifuentes is a 45 y o  male with:   coronary artery disease diagnosed in setting of a non ST segment elevation myocardial infarction in 2019 with drug-eluting stent x1 to 1st obtuse marginal, dyslipidemia who presents today for follow-up      He did state at prior visit that he had chest pain  He states that the pain starts in his back and then comes around to his chest  [facility] states if he cracks is back or fully stretches his chest he will feel a crack or a pop in his chest and then the pain goes away  He has been seen by pain management and was found to have a herniated disc which is suspected as causing his back pain with then referred pain to his chest  He states he does a lot heavy lifting with his job   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, uses a sledgehammer  I suspect he has thoracic radiculopathy/possible brachial plexus type injury  He denies any angina sounding chest pain    Review of Systems   Constitutional: Negative for chills and fever  HENT: Negative for facial swelling and sore throat  Eyes: Negative for visual disturbance  Respiratory: Negative for cough, chest tightness, shortness of breath and wheezing  Cardiovascular: Negative for chest pain, palpitations and leg swelling  Gastrointestinal: Negative for abdominal pain, blood in stool, constipation, diarrhea, nausea and vomiting  Endocrine: Negative for cold intolerance and heat intolerance     Genitourinary: Negative for decreased urine volume, difficulty urinating, dysuria and hematuria  Musculoskeletal: Positive for arthralgias and neck pain  Negative for back pain and myalgias  Skin: Negative for rash  Neurological: Negative for dizziness, syncope, weakness and numbness  Psychiatric/Behavioral: Negative for agitation, behavioral problems and confusion  The patient is not nervous/anxious  OBJECTIVE  Vitals:    02/08/22 1516   BP: 98/72   Pulse: 62       Physical Exam  Constitutional: awake, alert and oriented, in no acute distress, no obvious deformities, obese male  Head: Normocephalic, without obvious abnormality, atraumatic  Eyes: conjunctivae clear and moist  Sclera anicteric  No xanthelasmas  Pupils equal bilaterally  Extraocular motions are full  Ear nose mouth and throat: ears are symmetrical bilaterally, hearing appears to be equal bilaterally, no nasal discharge or epistaxis, oropharynx is clear with moist mucous membranes  Neck:  Trachea is midline, neck is supple, no thyromegaly or significant lymphadenopathy, there is full range of motion  Lungs: clear to auscultation bilaterally, no wheezes, no rales, no rhonchi, no accessory muscle use, breathing is nonlabored  Heart: regular rate and rhythm, S1, S2 normal, no murmur, no click, no rub and no gallop, no lower extremity edema  Abdomen:  Obese, soft, non-tender; bowel sounds normal; no masses,  no organomegaly  Psychiatric:  Patient is oriented to time, place, person, mood/affect is negative for depression, anxiety, agitation, appears to have appropriate insight  Skin: Skin is warm, dry, intact  No obvious rashes or lesions on exposed extremities  Nail beds are pink with no cyanosis or clubbing  EKG:  No results found for this visit on 02/08/22  IMPRESSION:  1  Atypical chest pain, likely musculoskeletal related to his neck pain  2  Coronary artery disease status post PCI to the 1st obtuse marginal in June 2019  3  Dyslipidemia  4  Tobacco abuse  5   Mild valvular heart disease seen on echo in 2019    DISCUSSION/RECOMMENDATIONS:   He is doing reasonably well from cardiovascular standpoint    Head and denies any angina sounding chest pain    Blood pressure has been stable    He does note left arm pain which seems to be radicular pain from his neck, his going to follow-up with orthopedics for this    He did present to the emergency department several times for pain, cardiac enzymes are negative at that time    Would continue aspirin 81, Lipitor 80, metoprolol 25   Check new echo in  for valvular heart disease seen in 2019, 3 year follow-up   Plan for follow-up after new echo  If stable at that time, may only need to follow yearly    Yu Payton DO, [facility] [place]  --------------------------------------------------------------------------------  TREADMILL STRESS  No results found for this or any previous visit      ----------------------------------------------------------------------------------------------  NUCLEAR STRESS TEST: Results for orders placed during the hospital encounter of 20    NM myocardial perfusion spect (stress and/or rest)    Narrative  [facility] [address] [facility] [address], [address]  [phone]    Exercise    Patient: Kamron Medellin  MR number: [id number]  Account number: [de-identified]  : 1970  Age: 47 years  Gender: Male  Status: Outpatient  Location: Stress lab  Height: 72 in  Weight: 214 lb  BP: 108/ 80 mmHg    Allergies: NO KNOWN ALLERGIES    Diagnosis: 786 50 - CHEST PAIN NOS    RN:  Marifer Ruff RN  Primary Physician:  Jayna Cruz  Referring Physician:  RODRIGUEZ Brown  Group:  [facility]  Interpreting Physician:  Nidhi Moralez DO    INDICATIONS: Coronary artery disease  HISTORY: chest pain The patient is a 47year old male  Chest pain status: chest pain  Other symptoms: dyspnea   Coronary artery disease risk factors: dyslipidemia, hypertension, smoking, and family history of premature coronary artery  disease  REST ECG: Normal sinus rhythm  PROCEDURE: The study was performed in the the Stress lab  Treadmill exercise testing was performed, using the Dyllan protocol  Systolic blood pressure was 108 mmHg, at the start of the study  Diastolic blood pressure was 80 mmHg, at the  start of the study  The heart rate was 68 bpm, at the start of the study  IV double checked  DYLLAN PROTOCOL:  HR bpm SBP mmHg DBP mmHg Symptoms  Baseline 68 108 80 none  Stage 1 92 110 80 --  Stage 2 100 114 82 --  Stage 3 112 120 84 --  Stage 4 139 -- -- --  Recovery 2 90 120 84 --  Recovery 3 97 100 72 --    STRESS SUMMARY: Duration of exercise was 12 min  The patient exercised to protocol stage 4  Maximal work rate was 13 4 METs  Functional capacity was normal  Maximal heart rate during stress was 141 bpm ( 83 % of maximal predicted heart  rate)  The heart rate response to stress was normal  There was normal resting blood pressure with a blunted response to stress  The rate-pressure product for the peak heart rate and blood pressure was 37584  There was no chest pain during  stress  The stress test was terminated due to moderate fatigue  The stress ECG was negative for ischemia and normal  There were no stress arrhythmias or conduction abnormalities  ISOTOPE ADMINISTRATION:  Resting isotope administration Stress isotope administration  Agent Tetrofosmin Tetrofosmin  Dose 11 mCi 32 7 mCi  Date 07/09/2020 07/09/2020  Injection-image interval 25 min 35 min    The radiopharmaceutical was injected one minute before the end of exercise  MYOCARDIAL PERFUSION IMAGING:  The image quality was good  Rotating projection images reveal mild diaphragmatic attenuation  Left ventricular size was normal  The TID ratio was 0 86      PERFUSION DEFECTS:  -  There was a small to moderate, moderately severe, fixed myocardial perfusion defect of the inferior wall likely due to diaphragm attenuation  GATED SPECT:  The calculated left ventricular ejection fraction was 49 %  There was no left ventricular regional abnormality  SUMMARY:  -  Stress results: Duration of exercise was 12 min  Target heart rate was not achieved  There was normal resting blood pressure with a blunted response to stress  There was no chest pain during stress  -  ECG conclusions: The stress ECG was negative for ischemia and normal   -  Perfusion imaging: There was a small to moderate, moderately severe, fixed myocardial perfusion defect of the inferior wall likely due to diaphragm attenuation   -  Gated SPECT: The calculated left ventricular ejection fraction was 49 %  There was no left ventricular regional abnormality  IMPRESSIONS: Normal study after maximal exercise  There was image artifact, without diagnostic evidence for perfusion abnormality  Diagnostic sensitivity was limited by submaximal stress  Prepared and signed by    Sander Juarez DO  Signed 2020 11:52:57    No results found for this or any previous visit       --------------------------------------------------------------------------------  CATH:  Results for orders placed during the hospital encounter of 06/10/19    Cardiac catheterization    Narrative  [address] [place] [address], [address] [facility]  [phone]    [facility]    Invasive Cardiovascular Lab Complete Report    Patient: Roshan Booker  MR number: [id number]  Account number: [de-identified]  Study date: 06/10/2019  Gender: Male  : 1970  Height: 72 8 in  Weight: 222 2 lb  BSA: 2 25 mï¾²    Allergies: NO KNOWN ALLERGIES    Diagnostic Cardiologist:  Tarun Payan DO  Interventional Cardiologist:  Tarun Payan DO  Primary Physician:  ELEANOR Rodriguez    SUMMARY    CORONARY CIRCULATION:  There was 1-vessel coronary artery disease  Left main: Normal   1st obtuse marginal: There was a 90 % stenosis   Remainder circumflex nonobstructive    HEMODYNAMICS:  Hemodynamic assessment demonstrated mildly elevated LVEDP  1ST LESION INTERVENTIONS:  A drug-eluting stent with balloon angioplasty procedure was performed on the 90 % lesion in the 1st obtuse marginal  Following intervention there was an excellent angiographic appearance with a 0 % residual stenosis  A Xience Alba Rx 3 25 x 15mm drug-eluting stent was placed across the lesion and deployed at a maximum inflation pressure of 14 rich  INDICATIONS:  --  Possible CAD: myocardial infarction without ST elevation (NSTEMI)  PROCEDURES PERFORMED    --  Left heart catheterization without ventriculogram   --  Right coronary angiography  --  Left coronary angiography  --  Inpatient  --  Mod Sedation Same Physician Initial 15min  --  Mod Sedation Same Physician Add 15min  --  Coronary Catheterization (w/ LHC)  --  Coronary Drug Eluting Stent w/PTCA  --  Intervention on OM1: drug-eluting stent, balloon angioplasty  PROCEDURE: The risks and alternatives of the procedures and conscious sedation were explained to the patient and informed consent was obtained  The patient was brought to the cath lab and placed on the table  The planned puncture sites  were prepped and draped in the usual sterile fashion  --  Right radial artery access  After performing an Juliano's test to verify adequate ulnar artery supply to the hand, the radial site was prepped  The puncture site was infiltrated with local anesthetic  The vessel was accessed using the  modified Seldinger technique, a wire was advanced into the vessel, and a sheath was advanced over the wire into the vessel  --  Left heart catheterization without ventriculogram  A catheter was advanced over a guidewire into the ascending aorta  After recording ascending aortic pressure, the catheter was advanced across the aortic valve and left ventricular  pressure was recorded   The catheter was pulled back across the aortic valve and into the ascending aorta and pullback pressures were obtained  --  Right coronary artery angiography  A catheter was advanced over a guidewire into the aorta and positioned in the right coronary artery ostium under fluoroscopic guidance  Angiography was performed  --  Left coronary artery angiography  A catheter was advanced over a guidewire into the aorta and positioned in the left coronary artery ostium under fluoroscopic guidance  Angiography was performed  --  Inpatient  --  Mod Sedation Same Physician Initial 15min  --  Mod Sedation Same Physician Add 15min  --  Coronary Catheterization (w/ LHC)  LESION INTERVENTION: A drug-eluting stent with balloon angioplasty procedure was performed on the 90 % lesion in the 1st obtuse marginal  Following intervention there was an excellent angiographic appearance with a 0 % residual stenosis  This was an ACC/AHA type B "moderate risk" lesion for intervention  There was SUSAN 3 flow before the procedure and SUSAN 3 flow after the procedure  There was no dissection  --  Vessel setup was performed  A Launcher 6Fr Ebu 3 5 guiding catheter was used to cannulate the vessel  --  Vessel setup was performed  A BMW   014 190cm wire was used to cross the lesion  --  A Xience Alba Rx 3 25 x 15mm drug-eluting stent was placed across the lesion and deployed at a maximum inflation pressure of 14 rich  --  Balloon angioplasty was performed, using a NC Trek Rx 3 5 x 15mm balloon, with 1 inflations and a maximum inflation pressure of 12 rich  INTERVENTIONS:  --  Coronary Drug Eluting Stent w/PTCA  PROCEDURE COMPLETION: The patient tolerated the procedure well and was discharged from the cath lab  TIMING: Test started at 10:11  Test concluded at 10:38  HEMOSTASIS: The sheath was removed  The site was compressed with a Hemoband  device  Hemostasis was obtained  MEDICATIONS GIVEN: Baby Aspirin, 324 mg, PO, at 10:12   Fentanyl (1OOmcg/2 ml), 50 mcg, IV, at 10:19  Versed (2mg/2ml), 2 mg, IV, at 10:19  1% Lidocaine, 1 ml, subcutaneously, at 10:19  Verapamil (5mg/2ml),  2 5 mg, IV, at 10:19  Heparin 1000 units/ml, 4,000 units, IV, at 10:19  Nitroglycerin (200mcg/ml), 200 mcg, at 10:19  Heparin 1000 units/ml, 6,000 units, IV, at 10:27  CONTRAST GIVEN: 75 ml Omnipaque (350mg I /ml)  RADIATION EXPOSURE:  Fluoroscopy time: 3 7 min  HEMODYNAMICS: Hemodynamic assessment demonstrated mildly elevated LVEDP  CORONARY VESSELS:   --  The coronary circulation is right dominant  --  There was 1-vessel coronary artery disease  --  Left main: Normal   --  LAD: Angiography showed minor luminal irregularities  --  1st obtuse marginal: There was a 90 % stenosis  Remainder circumflex nonobstructive  --  RCA: Angiography showed minor luminal irregularities  IMPRESSIONS:  PCI of OM with RORY    RECOMMENDATIONS  asa 81 indefinitely, plavix x 1 year    DISPOSITION:  The patient left the catheterization laboratory in stable condition  Prepared and signed by    Irene Kraft DO  Signed 06/10/2019 10:41:34    Study diagram    Angiographic findings  Native coronary lesions:  ï¾·OM1: Lesion 1: 90 % stenosis  Intervention results  Native coronary lesions:  ï¾·drug-eluting stent and balloon angioplasty of the 90 % stenosis in OM1  Appearance excellent with 0 % residual stenosis  Stent: Nica Conine Rx 3 25 x 15mm drug-eluting      Hemodynamic tables    Pressures:  Baseline  Pressures:  - HR: 70  Pressures:  - Rhythm:  Pressures:  -- Aortic Pressure (S/D/M): 119/35/95  Pressures:  -- Left Ventricle (s/edp): 106/19/--    Outputs:  Baseline  Outputs:  -- CALCULATIONS: [age]  Outputs:  -- CALCULATIONS: Body Surface Area: 2 25  Outputs:  -- CALCULATIONS: Height in cm: 185 00  Outputs:  -- CALCULATIONS: Sex: Male  Outputs:  -- CALCULATIONS: Weight in k 00    --------------------------------------------------------------------------------  ECHO:   Results for orders placed during the hospital encounter of 06/10/19    Echo complete with contrast if indicated    Narrative  [address]  [place], [address]  [phone]    Transthoracic Echocardiogram  2D, M-mode, Doppler, and Color Doppler    Study date:  2019    Patient: Kamron Medellin  MR number: [id number]  Account number: [de-identified]  : 1970  Age: 51 years  Gender: Male  Status: Inpatient  Location: Bedside  Height: 73 in  Weight: 223 lb  BP: 116/ 75 mmHg    Indications: NSTEMI    Diagnoses: I21 4 - Non-ST elevation (NSTEMI) myocardial infarction    Sonographer:  Chico Crawford  Primary Physician:  ELEANOR Cartwright  Referring Physician:  Ghazala Barnes MD  Group:  [facility]  Interpreting Physician:  Gilma Huerta MD    SUMMARY    LEFT VENTRICLE:  Normal left ventricular systolic function, EF 71%  Normal left ventricular cavity size  Normal left ventricular wall thickness  Normal left ventricular wall motion without regional wall motion abnormalities  Normal left ventricular  diastolic function  Normal left atrial pressures and left ventricular filling pressures  LEFT ATRIUM:  Mild left atrial enlargement  MITRAL VALVE:  There was trace regurgitation  TRICUSPID VALVE:  There was mild regurgitation  HISTORY: PRIOR HISTORY: NSTEMI, tobacco use  PROCEDURE: The procedure was performed at the bedside  This was a routine study  The transthoracic approach was used  The study included complete 2D imaging, M-mode, complete spectral Doppler, and color Doppler  The heart rate was 54 bpm,  at the start of the study  Images were obtained from the parasternal, apical, subcostal, and suprasternal notch acoustic windows  Echocardiographic views were limited due to high windows and lung interference  Image quality was adequate  LEFT VENTRICLE: Normal left ventricular systolic function, EF 74%   Normal left ventricular cavity size  Normal left ventricular wall thickness  Normal left ventricular wall motion without regional wall motion abnormalities  Normal left  ventricular diastolic function  Normal left atrial pressures and left ventricular filling pressures  RIGHT VENTRICLE: The size was normal  Systolic function was normal  Wall thickness was normal     LEFT ATRIUM: Mild left atrial enlargement  RIGHT ATRIUM: Size was normal     MITRAL VALVE: Valve structure was normal  There was normal leaflet separation  DOPPLER: The transmitral velocity was within the normal range  There was no evidence for stenosis  There was trace regurgitation  AORTIC VALVE: The valve was trileaflet  Leaflets exhibited normal thickness and normal cuspal separation  DOPPLER: Transaortic velocity was within the normal range  There was no evidence for stenosis  There was no regurgitation  TRICUSPID VALVE: The valve structure was normal  There was normal leaflet separation  DOPPLER: The transtricuspid velocity was within the normal range  There was no evidence for stenosis  There was mild regurgitation  PULMONIC VALVE: DOPPLER: The transpulmonic velocity was within the normal range  There was no regurgitation  PERICARDIUM: There was no pericardial effusion  The pericardium was normal in appearance  AORTA: The root exhibited normal size  PULMONARY ARTERY: The size was normal  DOPPLER: Systolic pressure was within the normal range      SYSTEM MEASUREMENT TABLES    2D  %FS: 37 3 %  Ao Diam: 2 9 cm  EDV(Teich): 137 8 ml  EF(Teich): 66 8 %  ESV(Teich): 45 7 ml  IVSd: 0 9 cm  LA Area: 21 1 cm2  LA Diam: 4 1 cm  LVEDV MOD A4C: 135 2 ml  LVEF MOD A4C: 57 9 %  LVESV MOD A4C: 56 9 ml  LVIDd: 5 3 cm  LVIDs: 3 3 cm  LVLd A4C: 8 8 cm  LVLs A4C: 7 5 cm  LVPWd: 0 9 cm  RA Area: 14 9 cm2  RVIDd: 2 9 cm  SV MOD A4C: 78 3 ml  SV(Teich): 92 1 ml    CW  AV Vmax: 1 4 m/s  AV maxP 9 mmHg  TR Vmax: 2 2 m/s  TR maxP 9 mmHg    MM  TAPSE: 2 cm    PW  E': 0 1 m/s  E/E': 10 5  MV A [place]: 0 5 m/s  MV Dec [place]: 5 1 m/s2  MV DecT: 157 6 ms  MV E [place]: 0 8 m/s  MV E/A Ratio: 1 5  MV PHT: 45 7 ms  MVA By PHT: 4 8 cm2    [facility] [place] Commission Accredited Echocardiography Laboratory    Prepared and electronically signed by    Slade Lafleur MD  Signed 11-Jun-2019 16:36:31    No results found for this or any previous visit     --------------------------------------------------------------------------------  HOLTER  No results found for this or any previous visit  No results found for this or any previous visit     --------------------------------------------------------------------------------  CAROTIDS  No results found for this or any previous visit      --------------------------------------------------------------------------------  Diagnoses and all orders for this visit:    Coronary artery disease involving native coronary artery of native heart without angina pectoris  -     Echo complete w/ contrast if indicated; Future    History of non-ST elevation myocardial infarction (NSTEMI)  -     nitroglycerin (NITROSTAT) 0 4 mg SL tablet; Place 1 tablet (0 4 mg total) under the tongue every 5 (five) minutes as needed for chest pain    Tobacco abuse    Overweight (BMI 25 0-29  9)    Chronic pain syndrome    Chronic bilateral thoracic back pain    Thoracic radiculopathy    Thoracic disc herniation    Chest pain, unspecified type  -     nitroglycerin (NITROSTAT) 0 4 mg SL tablet; Place 1 tablet (0 4 mg total) under the tongue every 5 (five) minutes as needed for chest pain    Valvular heart disease  -     Echo complete w/ contrast if indicated;  Future       ======================================================    Past Medical History:   Diagnosis Date    Hyperlipidemia     Hypertension     [facility]myocardial infarction) ([facility] [address] )      Past Surgical History:   Procedure Laterality Date    CORONARY ANGIOPLASTY WITH STENT PLACEMENT      HAND SURGERY           Medications  Current Outpatient Medications   Medication Sig Dispense Refill    aspirin 81 mg chewable tablet Chew 1 tablet (81 mg total) daily 90 tablet 3    atorvastatin (LIPITOR) 80 mg tablet Take 1 tablet (80 mg total) by mouth daily with dinner 30 tablet 5    Diclofenac Sodium (VOLTAREN) 1 % Apply 2 g topically 4 (four) times a day 100 g 5    meclizine (ANTIVERT) 25 mg tablet Take 1 tablet (25 mg total) by mouth 3 (three) times a day as needed for dizziness 30 tablet 5    methocarbamol (ROBAXIN) 500 mg tablet Take 1 tablet (500 mg total) by mouth 2 (two) times a day as needed for muscle spasms 20 tablet 0    metoprolol succinate (TOPROL-XL) 25 mg 24 hr tablet Take 1 tablet (25 mg total) by mouth daily 30 tablet 5    nitroglycerin (NITROSTAT) 0 4 mg SL tablet Place 1 tablet (0 4 mg total) under the tongue every 5 (five) minutes as needed for chest pain 25 tablet 3    Blood Pressure Monitoring KIT by Does not apply route 2 (two) times a day (Patient not taking: Reported on 8/4/2021) 1 kit 0    naproxen (NAPROSYN) 375 mg tablet Take 1 tablet (375 mg total) by mouth 2 (two) times a day with meals (Patient not taking: Reported on 2/8/2022 ) 20 tablet 0     No current facility-administered medications for this visit          No Known Allergies    Social History     Socioeconomic History    Marital status: /Civil Union     Spouse name: Not on file    Number of children: Not on file    Years of education: Not on file    Highest education level: Not on file   Occupational History    Not on file   Tobacco Use    Smoking status: Current Every Day Smoker     Packs/day: 1 00     Types: Cigarettes    Smokeless tobacco: Never Used   Vaping Use    Vaping Use: Never used   Substance and Sexual Activity    Alcohol use: Yes     Comment: occasional    Drug use: Not Currently    Sexual activity: Yes     Partners: Female   Other Topics Concern    Not on file   Social History Narrative    Not on file     Social Determinants of Health     Financial Resource Strain: Not on file   Food Insecurity: Not on file   Transportation Needs: Not on file   Physical Activity: Not on file   Stress: Not on file   Social Connections: Not on file   Intimate Partner Violence: Not on file   Housing Stability: Not on file        Family History   Problem Relation Age of Onset    No Known Problems Mother     No Known Problems Father        Lab Results   Component Value Date    WBC 9 15 11/04/2021    HGB 16 5 11/04/2021    HCT 52 1 (H) 11/04/2021    MCV 98 11/04/2021     11/04/2021      Lab Results   Component Value Date    SODIUM 139 11/04/2021    K 5 0 11/04/2021     11/04/2021    CO2 27 11/04/2021    BUN 18 11/04/2021    CREATININE 1 25 11/04/2021    GLUC 102 11/04/2021    CALCIUM 8 9 11/04/2021      Lab Results   Component Value Date    HGBA1C 5 4 09/10/2019      Lab Results   Component Value Date    CHOL 225 10/17/2014     Lab Results   Component Value Date    HDL 36 (L) 09/10/2019    HDL 34 (L) 06/10/2019    HDL 31 10/17/2014     Lab Results   Component Value Date    LDLCALC 87 09/10/2019    LDLCALC 158 (H) 06/10/2019    LDLCALC 162 (H) 10/17/2014     Lab Results   Component Value Date    TRIG 93 09/10/2019    TRIG 157 (H) 06/10/2019    TRIG 161 10/17/2014     No results found for: CHOLHDL   Lab Results   Component Value Date    INR 1 05 01/24/2021    INR 1 06 04/12/2020    INR 1 03 06/09/2019    PROTIME 13 5 01/24/2021    PROTIME 13 8 04/12/2020    PROTIME 13 0 06/09/2019          Patient Active Problem List    Diagnosis Date Noted    Chronic bilateral thoracic back pain 02/08/2022    Dizziness 02/08/2022    Coronary artery disease involving native coronary artery of native heart without angina pectoris 02/08/2022    Chronic pain syndrome 07/23/2021    Thoracic disc herniation 07/23/2021    Thoracic radiculopathy 07/23/2021    Chest pain 01/24/2021    Prediabetes 06/17/2019    Hypercholesterolemia 06/11/2019    History of non-ST elevation myocardial infarction (NSTEMI) 06/10/2019    Tobacco abuse 06/10/2019    Blurry vision, bilateral 10/10/2014    Fatigue 10/10/2014    Overweight (BMI 25 0-29 9) 10/10/2014       Portions of the record may have been created with voice recognition software  Occasional wrong word or "sound a like" substitutions may have occurred due to the inherent limitations of voice recognition software  Read the chart carefully and recognize, using context, where substitutions have occurred      Saad Colby DO, [facility]  2/8/2022 3:57 PM

## 2022-02-09 LAB
EST. AVERAGE GLUCOSE BLD GHB EST-MCNC: 117 MG/DL
HBA1C MFR BLD: 5.7 %
HIV 1+2 AB+HIV1 P24 AG SERPL QL IA: NORMAL

## 2022-02-24 ENCOUNTER — OFFICE VISIT (OUTPATIENT)
Dept: OBGYN CLINIC | Facility: CLINIC | Age: 52
End: 2022-02-24
Payer: COMMERCIAL

## 2022-02-24 ENCOUNTER — APPOINTMENT (OUTPATIENT)
Dept: RADIOLOGY | Facility: MEDICAL CENTER | Age: 52
End: 2022-02-24
Payer: COMMERCIAL

## 2022-02-24 VITALS
BODY MASS INDEX: 29.95 KG/M2 | WEIGHT: 226 LBS | HEIGHT: 73 IN | DIASTOLIC BLOOD PRESSURE: 73 MMHG | HEART RATE: 69 BPM | SYSTOLIC BLOOD PRESSURE: 107 MMHG

## 2022-02-24 DIAGNOSIS — M75.82 ROTATOR CUFF TENDINITIS, LEFT: Primary | ICD-10-CM

## 2022-02-24 DIAGNOSIS — M75.42 IMPINGEMENT SYNDROME OF LEFT SHOULDER: ICD-10-CM

## 2022-02-24 DIAGNOSIS — M79.602 LEFT ARM PAIN: ICD-10-CM

## 2022-02-24 DIAGNOSIS — R42 DIZZINESS: ICD-10-CM

## 2022-02-24 PROCEDURE — 3008F BODY MASS INDEX DOCD: CPT | Performed by: ORTHOPAEDIC SURGERY

## 2022-02-24 PROCEDURE — 99203 OFFICE O/P NEW LOW 30 MIN: CPT | Performed by: ORTHOPAEDIC SURGERY

## 2022-02-24 PROCEDURE — 4004F PT TOBACCO SCREEN RCVD TLK: CPT | Performed by: ORTHOPAEDIC SURGERY

## 2022-02-24 PROCEDURE — 20610 DRAIN/INJ JOINT/BURSA W/O US: CPT | Performed by: ORTHOPAEDIC SURGERY

## 2022-02-24 PROCEDURE — 73030 X-RAY EXAM OF SHOULDER: CPT

## 2022-02-24 RX ORDER — TRIAMCINOLONE ACETONIDE 40 MG/ML
40 INJECTION, SUSPENSION INTRA-ARTICULAR; INTRAMUSCULAR
Status: COMPLETED | OUTPATIENT
Start: 2022-02-24 | End: 2022-02-24

## 2022-02-24 RX ORDER — BUPIVACAINE HYDROCHLORIDE 2.5 MG/ML
4 INJECTION, SOLUTION INFILTRATION; PERINEURAL
Status: COMPLETED | OUTPATIENT
Start: 2022-02-24 | End: 2022-02-24

## 2022-02-24 RX ADMIN — BUPIVACAINE HYDROCHLORIDE 4 ML: 2.5 INJECTION, SOLUTION INFILTRATION; PERINEURAL at 11:55

## 2022-02-24 RX ADMIN — TRIAMCINOLONE ACETONIDE 40 MG: 40 INJECTION, SUSPENSION INTRA-ARTICULAR; INTRAMUSCULAR at 11:55

## 2022-02-24 NOTE — PROGRESS NOTES
Assessment/Plan:   Diagnoses and all orders for this visit:    Left arm pain  -     Ambulatory Referral to Orthopedic Surgery  -     XR shoulder 2+ vw left; Future    Discussed with patient that his complaint of pain along his lateral chest wall and posterior ribs are most likely in relation to thoracic spine pain, and he should follow up with Dr Breezy Aguirre in regards to those issues  As far as his shoulder, his x-rays demonstrate a small subacromial spur but no acute signs of osseous abnormality or malalignment  His physical exam findings are most consistent with rotator cuff tendinitis and impingement syndrome of the left shoulder  Patient was offered, and accepted, Marcaine and Kenalog injection to the left subacromial bursa for relief of pain and inflammation  Patient tolerated treatment well  He can continue activity as tolerated without limitations or restriction  He will be seen for follow-up in 6 weeks for re-evaluation  Patient expresses understanding is in agreement with treatment plan  Under aseptic technique, the left shoulder was injected with Kenalog and Marcaine  He tolerated procedure quite well  Return back in 6 weeks for evaluation  If his condition changes, he will not hesitate to let us know  Physical examination shows both primary secondary signs of impingement  No gross instability  Rotator cuff strength testing was grossly intact    Subjective:   Patient ID: Mani Weathers  1970     HPI  Patient is a 45 y o  male who presents for initial evaluation of left shoulder pain x3 +months  Patient denies any specific incident of injury  He reports pain along the left lateral and posterior thoracic wall, and states that he was seen in pain management regards to this issue, but he states that of late, his pain seems to be emanating from the shoulder  He describes his pain as achy, but sharp with motions above shoulder height    He denies any associated bruising, swelling, numbness, tingling, or feelings of instability      The following portions of the patient's history were reviewed and updated as appropriate:  Past medical history, past surgical history, Family history, social history, current medications and allergies    Past Medical History:   Diagnosis Date    Hyperlipidemia     Hypertension     MI (myocardial infarction) ([facility] [address] )        Past Surgical History:   Procedure Laterality Date    CORONARY ANGIOPLASTY WITH STENT PLACEMENT      HAND SURGERY         Family History   Problem Relation Age of Onset    No Known Problems Mother     No Known Problems Father        Social History     Socioeconomic History    Marital status: /Civil Union     Spouse name: None    Number of children: None    Years of education: None    Highest education level: None   Occupational History    None   Tobacco Use    Smoking status: Current Every Day Smoker     Packs/day: 1 00     Types: Cigarettes    Smokeless tobacco: Never Used   Vaping Use    Vaping Use: Never used   Substance and Sexual Activity    Alcohol use: Yes     Comment: occasional    Drug use: Not Currently    Sexual activity: Yes     Partners: Female   Other Topics Concern    None   Social History Narrative    None     Social Determinants of Health     Financial Resource Strain: Not on file   Food Insecurity: Not on file   Transportation Needs: Not on file   Physical Activity: Not on file   Stress: Not on file   Social Connections: Not on file   Intimate Partner Violence: Not on file   Housing Stability: Not on file         Current Outpatient Medications:     aspirin 81 mg chewable tablet, Chew 1 tablet (81 mg total) daily, Disp: 90 tablet, Rfl: 3    atorvastatin (LIPITOR) 80 mg tablet, Take 1 tablet (80 mg total) by mouth daily with dinner, Disp: 30 tablet, Rfl: 5    Diclofenac Sodium (VOLTAREN) 1 %, Apply 2 g topically 4 (four) times a day, Disp: 100 g, Rfl: 5    meclizine (ANTIVERT) 25 mg tablet, Take 1 tablet (25 mg total) by mouth 3 (three) times a day as needed for dizziness, Disp: 30 tablet, Rfl: 5    methocarbamol (ROBAXIN) 500 mg tablet, Take 1 tablet (500 mg total) by mouth 2 (two) times a day as needed for muscle spasms, Disp: 20 tablet, Rfl: 0    metoprolol succinate (TOPROL-XL) 25 mg 24 hr tablet, Take 1 tablet (25 mg total) by mouth daily, Disp: 30 tablet, Rfl: 5    nitroglycerin (NITROSTAT) 0 4 mg SL tablet, Place 1 tablet (0 4 mg total) under the tongue every 5 (five) minutes as needed for chest pain, Disp: 25 tablet, Rfl: 3    Blood Pressure Monitoring KIT, by Does not apply route 2 (two) times a day (Patient not taking: Reported on 8/4/2021), Disp: 1 kit, Rfl: 0    naproxen (NAPROSYN) 375 mg tablet, Take 1 tablet (375 mg total) by mouth 2 (two) times a day with meals (Patient not taking: Reported on 2/8/2022 ), Disp: 20 tablet, Rfl: 0    No Known Allergies    Review of Systems   Constitutional: Negative for chills, fever and unexpected weight change  HENT: Negative for hearing loss, nosebleeds and sore throat  Eyes: Negative for pain, redness and visual disturbance  Respiratory: Negative for cough, shortness of breath and wheezing  Cardiovascular: Negative for chest pain, palpitations and leg swelling  Gastrointestinal: Negative for abdominal pain, nausea and vomiting  Endocrine: Negative for polydipsia and polyuria  Genitourinary: Negative for dysuria and hematuria  Musculoskeletal:        As noted in HPI   Skin: Negative for rash and wound  Neurological: Negative for dizziness, numbness and headaches  Psychiatric/Behavioral: Negative for decreased concentration and suicidal ideas  The patient is not nervous/anxious           Objective:  /73 (BP Location: Left arm, Patient Position: Sitting, Cuff Size: Large)   Pulse 69   Ht 6' 1" (1 854 m)   Wt 103 kg (226 lb)   BMI 29 82 kg/m²     Ortho Exam  Right shoulder -    No anatomical deformity  Skin is warm and dry to touch with no signs of erythema, ecchymosis, or infection  No soft tissue swelling or effusion noted  No palpable crepitus with passive motion  TTP over anterior acromion, TTP over long head biceps tendon  ROM  FF 0°-160°, ABD 0°-160°, ER 0°-90°, IR L3  MMT 5-/5 throughout with mild discernible pain inhibition  - glenohumeral instability appreciated on exam  + Neer's, + Nickerson  + Speed's, - Yergason's  - empty can, - drop-arm, - belly press, - resisted external rotation, - lift-off  Demonstrates normal elbow, wrist, and finger motion  2+ distal radial pulse with brisk capillary refill to the fingers  Radial, median, and ulnar motor and sensory distributions intact  Sensation light touch intact distally      Physical Exam  Vitals reviewed  Constitutional:       Appearance: He is well-developed  HENT:      Head: Normocephalic and atraumatic  Nose: Nose normal    Eyes:      Conjunctiva/sclera: Conjunctivae normal    Cardiovascular:      Rate and Rhythm: Normal rate  Pulmonary:      Effort: Pulmonary effort is normal    Musculoskeletal:      Cervical back: Neck supple  Skin:     General: Skin is warm and dry  Capillary Refill: Capillary refill takes less than 2 seconds  Neurological:      Mental Status: He is alert and oriented to person, place, and time  Psychiatric:         Mood and Affect: Mood normal          Behavior: Behavior normal           Diagnostic Test Review:  Attending Physician has personally reviewed pertinent imaging in PACS, impression is as follows:    Review of radiographic series taken 2/24/2022 of the left shoulder shows small subacromial spur, there is no sign of acute osseous abnormality or malalignment    Large joint arthrocentesis: L subacromial bursa  Universal Protocol:  Consent: Verbal consent obtained    Risks and benefits: risks, benefits and alternatives were discussed  Consent given by: patient  Time out: Immediately prior to procedure a "time out" was called to verify the correct patient, procedure, equipment, support staff and site/side marked as required    Timeout called at: 2/24/2022 11:52 AM   Patient understanding: patient states understanding of the procedure being performed  Site marked: the operative site was marked  Patient identity confirmed: verbally with patient    Supporting Documentation  Indications: pain and joint swelling   Procedure Details  Location: shoulder - L subacromial bursa  Preparation: Patient was prepped and draped in the usual sterile fashion  Needle size: 22 G  Ultrasound guidance: no  Approach: posterolateral  Medications administered: 4 mL bupivacaine 0 25 %; 40 mg triamcinolone acetonide 40 mg/mL    Patient tolerance: patient tolerated the procedure well with no immediate complications  Dressing:  Sterile dressing applied             Scribe Attestation    I,:  Kareem Vitale am acting as a scribe while in the presence of the attending physician :       I,:  Dhiraj Calloway DO personally performed the services described in this documentation    as scribed in my presence :

## 2022-04-07 ENCOUNTER — OFFICE VISIT (OUTPATIENT)
Dept: OBGYN CLINIC | Facility: CLINIC | Age: 52
End: 2022-04-07
Payer: COMMERCIAL

## 2022-04-07 VITALS
HEART RATE: 67 BPM | WEIGHT: 220 LBS | DIASTOLIC BLOOD PRESSURE: 83 MMHG | BODY MASS INDEX: 29.16 KG/M2 | SYSTOLIC BLOOD PRESSURE: 121 MMHG | HEIGHT: 73 IN

## 2022-04-07 DIAGNOSIS — M75.42 IMPINGEMENT SYNDROME OF LEFT SHOULDER: Primary | ICD-10-CM

## 2022-04-07 PROCEDURE — 4004F PT TOBACCO SCREEN RCVD TLK: CPT | Performed by: ORTHOPAEDIC SURGERY

## 2022-04-07 PROCEDURE — 3008F BODY MASS INDEX DOCD: CPT | Performed by: ORTHOPAEDIC SURGERY

## 2022-04-07 PROCEDURE — 99213 OFFICE O/P EST LOW 20 MIN: CPT | Performed by: ORTHOPAEDIC SURGERY

## 2022-04-07 NOTE — PROGRESS NOTES
Assessment/Plan:    No problem-specific Assessment & Plan notes found for this encounter  Diagnoses and all orders for this visit:    Impingement syndrome of left shoulder          The patient is doing quite well from his last shoulder injection  He has full strength full motion  Return back on an as-needed basis  If his condition changes, he will not hesitate to let us know    Subjective:      Patient ID: Esme Pavon is a 45 y o  male  HPI    The patient has a history of impingement of his left shoulder  This was injected on the last visit  He offers no complaints of pain  He denies any numbness or tingling  He is pleased with his results  He does not want another injection today    The following portions of the patient's history were reviewed and updated as appropriate: allergies, current medications, past family history, past medical history, past social history, past surgical history and problem list     Review of Systems   Constitutional: Negative for chills, fever and unexpected weight change  HENT: Negative for hearing loss, nosebleeds and sore throat  Eyes: Negative for pain, redness and visual disturbance  Respiratory: Negative for cough, shortness of breath and wheezing  Cardiovascular: Negative for chest pain, palpitations and leg swelling  Gastrointestinal: Negative for abdominal pain, nausea and vomiting  Endocrine: Negative for polydipsia and polyuria  Genitourinary: Negative for dysuria and hematuria  Musculoskeletal: Negative for arthralgias, back pain, gait problem, joint swelling, myalgias, neck pain and neck stiffness  As noted in HPI   Skin: Negative for rash and wound  Neurological: Negative for dizziness, numbness and headaches  Psychiatric/Behavioral: Negative for decreased concentration and suicidal ideas  The patient is not nervous/anxious            Objective:      /83 (BP Location: Left arm, Patient Position: Sitting, Cuff Size: Large) Pulse 67   Ht 6' 1" (1 854 m)   Wt 99 8 kg (220 lb)   BMI 29 03 kg/m²          Physical Exam        Neck was soft and supple  There is negative axial compression test is neck  Left upper extremity is neurovascular intact  Fingers are pink and mobile  Compartments are soft  Range of motion of left shoulder is 165° of flexion, 165° of abduction, and internal rotation to L4  There is mild signs of impingement  No instability  Rotator cuff strength testing was intact  Negative drop-arm test   There is a painless arc of motion throughout his left shoulder  No AC joint pain  No biceps pain    Negative speed's test   Neurologically intact distally

## 2022-04-27 DIAGNOSIS — G89.29 CHRONIC BILATERAL THORACIC BACK PAIN: ICD-10-CM

## 2022-04-27 DIAGNOSIS — M79.602 LEFT ARM PAIN: ICD-10-CM

## 2022-04-27 DIAGNOSIS — M54.6 CHRONIC BILATERAL THORACIC BACK PAIN: ICD-10-CM

## 2022-04-27 RX ORDER — METHOCARBAMOL 500 MG/1
500 TABLET, FILM COATED ORAL 2 TIMES DAILY PRN
Qty: 20 TABLET | Refills: 0 | Status: SHIPPED | OUTPATIENT
Start: 2022-04-27

## 2022-05-04 ENCOUNTER — VBI (OUTPATIENT)
Dept: ADMINISTRATIVE | Facility: OTHER | Age: 52
End: 2022-05-04

## 2022-06-15 ENCOUNTER — HOSPITAL ENCOUNTER (OUTPATIENT)
Dept: NON INVASIVE DIAGNOSTICS | Facility: HOSPITAL | Age: 52
Discharge: HOME | End: 2022-06-15
Payer: COMMERCIAL

## 2022-06-15 VITALS
DIASTOLIC BLOOD PRESSURE: 72 MMHG | HEART RATE: 68 BPM | WEIGHT: 220 LBS | BODY MASS INDEX: 29.16 KG/M2 | SYSTOLIC BLOOD PRESSURE: 98 MMHG | HEIGHT: 73 IN

## 2022-06-15 DIAGNOSIS — I38 VALVULAR HEART DISEASE: ICD-10-CM

## 2022-06-15 DIAGNOSIS — I25.10 CORONARY ARTERY DISEASE INVOLVING NATIVE CORONARY ARTERY OF NATIVE HEART WITHOUT ANGINA PECTORIS: ICD-10-CM

## 2022-06-15 LAB
AORTIC ROOT: 3.1 CM
APICAL FOUR CHAMBER EJECTION FRACTION: 74 %
E WAVE DECELERATION TIME: 199 MS
FRACTIONAL SHORTENING: 37 % (ref 28–44)
INTERVENTRICULAR SEPTUM IN DIASTOLE (PARASTERNAL SHORT AXIS VIEW): 1 CM
INTERVENTRICULAR SEPTUM: 1 CM (ref 0.6–1.1)
LEFT ATRIUM AREA SYSTOLE SINGLE PLANE A4C: 13.8 CM2
LEFT ATRIUM SIZE: 3.9 CM
LEFT INTERNAL DIMENSION IN SYSTOLE: 3.3 CM (ref 2.1–4)
LEFT VENTRICULAR INTERNAL DIMENSION IN DIASTOLE: 5.2 CM (ref 3.5–6)
LEFT VENTRICULAR POSTERIOR WALL IN END DIASTOLE: 1 CM
LEFT VENTRICULAR STROKE VOLUME: 89 ML
LVSV (TEICH): 89 ML
MV E'TISSUE VEL-SEP: 13 CM/S
MV PEAK A VEL: 0.61 M/S
MV PEAK E VEL: 96 CM/S
MV STENOSIS PRESSURE HALF TIME: 58 MS
MV VALVE AREA P 1/2 METHOD: 3.79 CM2
RA PRESSURE ESTIMATED: 10 MMHG
RIGHT ATRIUM AREA SYSTOLE A4C: 12.3 CM2
RIGHT VENTRICLE ID DIMENSION: 3 CM
RV PSP: 40 MMHG
SL CV PED ECHO LEFT VENTRICLE DIASTOLIC VOLUME (MOD BIPLANE) 2D: 132 ML
SL CV PED ECHO LEFT VENTRICLE SYSTOLIC VOLUME (MOD BIPLANE) 2D: 43 ML
TR MAX PG: 30 MMHG
TR PEAK VELOCITY: 2.7 M/S
TRICUSPID ANNULAR PLANE SYSTOLIC EXCURSION: 2.3 CM
TRICUSPID VALVE PEAK REGURGITATION VELOCITY: 2.72 M/S

## 2022-06-15 PROCEDURE — 93306 TTE W/DOPPLER COMPLETE: CPT

## 2022-06-15 PROCEDURE — 93306 TTE W/DOPPLER COMPLETE: CPT | Performed by: INTERNAL MEDICINE

## 2022-06-17 ENCOUNTER — TELEPHONE (OUTPATIENT)
Dept: CARDIOLOGY CLINIC | Facility: CLINIC | Age: 52
End: 2022-06-17

## 2022-06-17 NOTE — TELEPHONE ENCOUNTER
----- Message from Ravindra Del Castillo, DO sent at 6/16/2022  2:13 PM EDT -----  Please call the patient regarding their normal result

## 2022-06-17 NOTE — TELEPHONE ENCOUNTER
Placed call to patient, he verbalized understanding  No questions or concerns at this time  Please note I tried [phone]# first and it was not accepting calls  I then called 201# and got hold of pt

## 2022-07-23 DIAGNOSIS — I21.4 NSTEMI (NON-ST ELEVATED MYOCARDIAL INFARCTION) (HCC): ICD-10-CM

## 2022-07-25 RX ORDER — ASPIRIN 81 MG
TABLET,CHEWABLE ORAL
Qty: 90 TABLET | Refills: 3 | Status: SHIPPED | OUTPATIENT
Start: 2022-07-25

## 2022-09-25 DIAGNOSIS — Z23 NEED FOR SHINGLES VACCINE: Primary | ICD-10-CM

## 2022-09-25 RX ORDER — ZOSTER VACCINE RECOMBINANT, ADJUVANTED 50 MCG/0.5
0.5 KIT INTRAMUSCULAR ONCE
Qty: 1 EACH | Refills: 1 | Status: SHIPPED | OUTPATIENT
Start: 2022-09-25 | End: 2022-09-25

## 2022-11-01 ENCOUNTER — TELEPHONE (OUTPATIENT)
Dept: CARDIOLOGY CLINIC | Facility: CLINIC | Age: 52
End: 2022-11-01

## 2022-11-01 DIAGNOSIS — I25.2 HISTORY OF NON-ST ELEVATION MYOCARDIAL INFARCTION (NSTEMI): ICD-10-CM

## 2022-11-01 NOTE — TELEPHONE ENCOUNTER
Patient call our office voice mail was left     We returned patient phone call back / no answer/ full voice mail

## 2022-11-04 DIAGNOSIS — I25.10 CORONARY ARTERY DISEASE INVOLVING NATIVE CORONARY ARTERY OF NATIVE HEART WITHOUT ANGINA PECTORIS: ICD-10-CM

## 2022-11-04 DIAGNOSIS — I25.2 HISTORY OF NON-ST ELEVATION MYOCARDIAL INFARCTION (NSTEMI): ICD-10-CM

## 2022-11-04 DIAGNOSIS — E78.00 HYPERCHOLESTEROLEMIA: ICD-10-CM

## 2022-11-04 RX ORDER — ATORVASTATIN CALCIUM 80 MG/1
TABLET, FILM COATED ORAL
Qty: 90 TABLET | Refills: 0 | Status: SHIPPED | OUTPATIENT
Start: 2022-11-04

## 2022-11-04 RX ORDER — METOPROLOL SUCCINATE 25 MG/1
25 TABLET, EXTENDED RELEASE ORAL DAILY
Qty: 30 TABLET | Refills: 0 | Status: SHIPPED | OUTPATIENT
Start: 2022-11-04

## 2022-11-30 DIAGNOSIS — I25.2 HISTORY OF NON-ST ELEVATION MYOCARDIAL INFARCTION (NSTEMI): ICD-10-CM

## 2022-11-30 RX ORDER — METOPROLOL SUCCINATE 25 MG/1
25 TABLET, EXTENDED RELEASE ORAL DAILY
Qty: 30 TABLET | Refills: 5 | Status: SHIPPED | OUTPATIENT
Start: 2022-11-30

## 2022-11-30 NOTE — TELEPHONE ENCOUNTER
----- Message from Paulita Goodell sent at 11/30/2022 12:13 PM EST -----  Needs refill for metoprolol succinate (TOPROL-XL) 25 mg 24 hr tablet [[id number]]  [facility] in [place]   has appt 12/2  Pt # [phone]

## 2022-12-02 ENCOUNTER — OFFICE VISIT (OUTPATIENT)
Dept: CARDIOLOGY CLINIC | Facility: CLINIC | Age: 52
End: 2022-12-02

## 2022-12-02 VITALS
DIASTOLIC BLOOD PRESSURE: 82 MMHG | WEIGHT: 228.6 LBS | SYSTOLIC BLOOD PRESSURE: 120 MMHG | HEIGHT: 73 IN | BODY MASS INDEX: 30.3 KG/M2 | HEART RATE: 60 BPM

## 2022-12-02 DIAGNOSIS — I25.10 CORONARY ARTERY DISEASE INVOLVING NATIVE CORONARY ARTERY OF NATIVE HEART WITHOUT ANGINA PECTORIS: Primary | ICD-10-CM

## 2022-12-02 DIAGNOSIS — E78.00 HYPERCHOLESTEROLEMIA: ICD-10-CM

## 2022-12-02 DIAGNOSIS — R06.09 DOE (DYSPNEA ON EXERTION): ICD-10-CM

## 2022-12-02 DIAGNOSIS — R07.9 CHEST PAIN, UNSPECIFIED TYPE: ICD-10-CM

## 2022-12-02 DIAGNOSIS — I25.2 HISTORY OF NON-ST ELEVATION MYOCARDIAL INFARCTION (NSTEMI): ICD-10-CM

## 2022-12-02 RX ORDER — EZETIMIBE 10 MG/1
10 TABLET ORAL DAILY
Qty: 90 TABLET | Refills: 3 | Status: SHIPPED | OUTPATIENT
Start: 2022-12-02

## 2022-12-02 NOTE — PROGRESS NOTES
Cardiology   MD Mike Ahumada MD Pollyann Confer, DO, NITIN Corey MD Alisia Needle, DO, Mariangel Rodriguez DO, [facility]  -------------------------------------------------------------------  [facility]  [address], [place] [address]  [phone]  [phone]  12/02/22  Gabi Willard  YOB: 1970   MRN: [id number]      Referring Physician: Rubio Barrios PA-C  [facility],  [address] [place] [address] Drive, [address]     HPI: Gabi Willard is a 45 y o  male with: coronary artery disease diagnosed in setting of a non ST segment elevation myocardial infarction in 2019 with drug-eluting stent x1 to 1st obtuse marginal, dyslipidemia who presents today for follow-up  He is unfortunately still smoking  He states that he has been having some symptoms of fatigue and SOB with exertion, he does work a very strenuous job and has musculoskeletal issues as well with chronic back pain and referred pain to his chest from this, he is on muscle relaxer type medications  Review of Systems   Constitutional: Positive for fatigue  Negative for chills and fever  HENT: Negative for facial swelling and sore throat  Eyes: Negative for visual disturbance  Respiratory: Positive for shortness of breath  Negative for cough, chest tightness and wheezing  Cardiovascular: Negative for chest pain, palpitations and leg swelling  Gastrointestinal: Negative for abdominal pain, blood in stool, constipation, diarrhea, nausea and vomiting  Endocrine: Negative for cold intolerance and heat intolerance  Genitourinary: Negative for decreased urine volume, difficulty urinating, dysuria and hematuria  Musculoskeletal: Negative for arthralgias, back pain and myalgias  Skin: Negative for rash  Neurological: Negative for dizziness, syncope, weakness and numbness  Psychiatric/Behavioral: Negative for agitation, behavioral problems and confusion  The patient is not nervous/anxious  OBJECTIVE  Vitals:    12/02/22 1007   BP: 120/82   Pulse: 60       Physical Exam  Constitutional: awake, alert and oriented, in no acute distress, no obvious deformities, obese male  Head: Normocephalic, without obvious abnormality, atraumatic  Eyes: conjunctivae clear and moist  Sclera anicteric  No xanthelasmas  Pupils equal bilaterally  Extraocular motions are full  Ear nose mouth and throat: ears are symmetrical bilaterally, hearing appears to be equal bilaterally, no nasal discharge or epistaxis, oropharynx is clear with moist mucous membranes  Neck:  Trachea is midline, neck is supple, no thyromegaly or significant lymphadenopathy, there is full range of motion  Lungs: clear to auscultation bilaterally, no wheezes, no rales, no rhonchi, no accessory muscle use, breathing is nonlabored  Heart: regular rate and rhythm, S1, S2 normal, no murmur, no click, no rub and no gallop, no lower extremity edema  Abdomen:  Obese, soft, non-tender; bowel sounds normal; no masses,  no organomegaly  Psychiatric:  Patient is oriented to time, place, person, mood/affect is negative for depression, anxiety, agitation, appears to have appropriate insight  Skin: Skin is warm, dry, intact  No obvious rashes or lesions on exposed extremities  Nail beds are pink with no cyanosis or clubbing      EKG:  Results for orders placed or performed in visit on 12/02/22   POCT ECG    Impression    Normal sinus rhythm normal ecg        IMPRESSION:  Atypical chest pain, likely musculoskeletal related to his neck pain, , however now having symptoms of fatigue and SOB with exertion  Coronary artery disease status post PCI to the 1st obtuse marginal in June 2019  Dyslipidemia  Tobacco abuse  Mild valvular heart disease seen on echo in 2019    DISCUSSION/RECOMMENDATIONS:  He presents today for follow-up  His blood pressure is under very good control  His EKG is normal  He does have symptoms now however of fatigue, feeling very tired as well as shortness of breath with exertion  Given his history of coronary disease with a stent to the obtuse marginal greater than 3 years ago, now with the development of these symptoms, would plan to re-evaluate for ischemic heart disease with exercise nuclear stress test  Continue with aspirin 81, Lipitor 80, metoprolol 25  LDL was 99 not to goal, add zetia 10mg  Would avoid butter, red meat, pork  Need to quit smoking, discussed x 3 min today  He did have a repeat echo in  of this year which did look very good, he has preserved LV function    Sarath Swan DO, [address] [facility]  --------------------------------------------------------------------------------  TREADMILL STRESS  No results found for this or any previous visit      ----------------------------------------------------------------------------------------------  NUCLEAR STRESS TEST: Results for orders placed during the hospital encounter of 20    NM myocardial perfusion spect (stress and/or rest)    Narrative  [address] [facility] [address] [place] [address] 44, [address]  [phone]    Exercise    Patient: Hannah Francis  MR number: [id number]  Account number: [de-identified]  : 1970  Age: 47 years  Gender: Male  Status: Outpatient  Location: Stress lab  Height: 72 in  Weight: 214 lb  BP: 108/ 80 mmHg    Allergies: NO KNOWN ALLERGIES    Diagnosis: 786 50 - CHEST PAIN NOS    RN:  Finn Ryan RN  Primary Physician:  Harlee Scheuermann  Referring Physician:  RODRIGUEZ Bush  Group:  [facility]  Interpreting Physician:  Katharine Mustard, DO    INDICATIONS: Coronary artery disease  HISTORY: chest pain The patient is a 47year old male  Chest pain status: chest pain  Other symptoms: dyspnea  Coronary artery disease risk factors: dyslipidemia, hypertension, smoking, and family history of premature coronary artery  disease      REST ECG: Normal sinus rhythm  PROCEDURE: The study was performed in the the Stress lab  Treadmill exercise testing was performed, using the Dyllan protocol  Systolic blood pressure was 108 mmHg, at the start of the study  Diastolic blood pressure was 80 mmHg, at the  start of the study  The heart rate was 68 bpm, at the start of the study  IV double checked  DYLLAN PROTOCOL:  HR bpm SBP mmHg DBP mmHg Symptoms  Baseline 68 108 80 none  Stage 1 92 110 80 --  Stage 2 100 114 82 --  Stage 3 112 120 84 --  Stage 4 139 -- -- --  Recovery 2 90 120 84 --  Recovery 3 97 100 72 --    STRESS SUMMARY: Duration of exercise was 12 min  The patient exercised to protocol stage 4  Maximal work rate was 13 4 METs  Functional capacity was normal  Maximal heart rate during stress was 141 bpm ( 83 % of maximal predicted heart  rate)  The heart rate response to stress was normal  There was normal resting blood pressure with a blunted response to stress  The rate-pressure product for the peak heart rate and blood pressure was 38031  There was no chest pain during  stress  The stress test was terminated due to moderate fatigue  The stress ECG was negative for ischemia and normal  There were no stress arrhythmias or conduction abnormalities  ISOTOPE ADMINISTRATION:  Resting isotope administration Stress isotope administration  Agent Tetrofosmin Tetrofosmin  Dose 11 mCi 32 7 mCi  Date 07/09/2020 07/09/2020  Injection-image interval 25 min 35 min    The radiopharmaceutical was injected one minute before the end of exercise  MYOCARDIAL PERFUSION IMAGING:  The image quality was good  Rotating projection images reveal mild diaphragmatic attenuation  Left ventricular size was normal  The TID ratio was 0 86  PERFUSION DEFECTS:  -  There was a small to moderate, moderately severe, fixed myocardial perfusion defect of the inferior wall likely due to diaphragm attenuation  GATED SPECT:  The calculated left ventricular ejection fraction was 49 %   There was no left ventricular regional abnormality  SUMMARY:  -  Stress results: Duration of exercise was 12 min  Target heart rate was not achieved  There was normal resting blood pressure with a blunted response to stress  There was no chest pain during stress  -  ECG conclusions: The stress ECG was negative for ischemia and normal   -  Perfusion imaging: There was a small to moderate, moderately severe, fixed myocardial perfusion defect of the inferior wall likely due to diaphragm attenuation   -  Gated SPECT: The calculated left ventricular ejection fraction was 49 %  There was no left ventricular regional abnormality  IMPRESSIONS: Normal study after maximal exercise  There was image artifact, without diagnostic evidence for perfusion abnormality  Diagnostic sensitivity was limited by submaximal stress  Prepared and signed by    Sigrid Mckeon DO  Signed 2020 11:52:57    No results found for this or any previous visit       --------------------------------------------------------------------------------  CATH:  Results for orders placed during the hospital encounter of 06/10/19    Cardiac catheterization    Narrative  [address] [place] [address], [address]  [phone]    [facility]    Invasive Cardiovascular Lab Complete Report    Patient: Taina Willson  MR number: [id number]  Account number: [de-identified]  Study date: 06/10/2019  Gender: Male  : 1970  Height: 72 8 in  Weight: 222 2 lb  BSA: 2 25 mï¾²    Allergies: NO KNOWN ALLERGIES    Diagnostic Cardiologist:  Arnav Palma DO  Interventional Cardiologist:  Arnav Palma DO  Primary Physician:  ELEANOR Lowery    SUMMARY    CORONARY CIRCULATION:  There was 1-vessel coronary artery disease  Left main: Normal   1st obtuse marginal: There was a 90 % stenosis  Remainder circumflex nonobstructive    HEMODYNAMICS:  Hemodynamic assessment demonstrated mildly elevated LVEDP      1ST LESION INTERVENTIONS:  A drug-eluting stent with balloon angioplasty procedure was performed on the 90 % lesion in the 1st obtuse marginal  Following intervention there was an excellent angiographic appearance with a 0 % residual stenosis  A Xience Alba Rx 3 25 x 15mm drug-eluting stent was placed across the lesion and deployed at a maximum inflation pressure of 14 rich  INDICATIONS:  --  Possible CAD: myocardial infarction without ST elevation (NSTEMI)  PROCEDURES PERFORMED    --  Left heart catheterization without ventriculogram   --  Right coronary angiography  --  Left coronary angiography  --  Inpatient  --  Mod Sedation Same Physician Initial 15min  --  Mod Sedation Same Physician Add 15min  --  Coronary Catheterization (w/ LHC)  --  Coronary Drug Eluting Stent w/PTCA  --  Intervention on OM1: drug-eluting stent, balloon angioplasty  PROCEDURE: The risks and alternatives of the procedures and conscious sedation were explained to the patient and informed consent was obtained  The patient was brought to the cath lab and placed on the table  The planned puncture sites  were prepped and draped in the usual sterile fashion  --  Right radial artery access  After performing an Juliano's test to verify adequate ulnar artery supply to the hand, the radial site was prepped  The puncture site was infiltrated with local anesthetic  The vessel was accessed using the  modified Seldinger technique, a wire was advanced into the vessel, and a sheath was advanced over the wire into the vessel  --  Left heart catheterization without ventriculogram  A catheter was advanced over a guidewire into the ascending aorta  After recording ascending aortic pressure, the catheter was advanced across the aortic valve and left ventricular  pressure was recorded  The catheter was pulled back across the aortic valve and into the ascending aorta and pullback pressures were obtained      --  Right coronary artery angiography  A catheter was advanced over a guidewire into the aorta and positioned in the right coronary artery ostium under fluoroscopic guidance  Angiography was performed  --  Left coronary artery angiography  A catheter was advanced over a guidewire into the aorta and positioned in the left coronary artery ostium under fluoroscopic guidance  Angiography was performed  --  Inpatient  --  Mod Sedation Same Physician Initial 15min  --  Mod Sedation Same Physician Add 15min  --  Coronary Catheterization (w/ LHC)  LESION INTERVENTION: A drug-eluting stent with balloon angioplasty procedure was performed on the 90 % lesion in the 1st obtuse marginal  Following intervention there was an excellent angiographic appearance with a 0 % residual stenosis  This was an ACC/AHA type B "moderate risk" lesion for intervention  There was SUSAN 3 flow before the procedure and SUSAN 3 flow after the procedure  There was no dissection  --  Vessel setup was performed  A Launcher 6Fr Ebu 3 5 guiding catheter was used to cannulate the vessel  --  Vessel setup was performed  A BMW   014 190cm wire was used to cross the lesion  --  A Xience Alba Rx 3 25 x 15mm drug-eluting stent was placed across the lesion and deployed at a maximum inflation pressure of 14 rich  --  Balloon angioplasty was performed, using a NC Trek Rx 3 5 x 15mm balloon, with 1 inflations and a maximum inflation pressure of 12 rich  INTERVENTIONS:  --  Coronary Drug Eluting Stent w/PTCA  PROCEDURE COMPLETION: The patient tolerated the procedure well and was discharged from the cath lab  TIMING: Test started at 10:11  Test concluded at 10:38  HEMOSTASIS: The sheath was removed  The site was compressed with a Hemoband  device  Hemostasis was obtained  MEDICATIONS GIVEN: Baby Aspirin, 324 mg, PO, at 10:12  Fentanyl (1OOmcg/2 ml), 50 mcg, IV, at 10:19  Versed (2mg/2ml), 2 mg, IV, at 10:19  1% Lidocaine, 1 ml, subcutaneously, at 10:19  Verapamil (5mg/2ml),  2 5 mg, IV, at 10:19  Heparin 1000 units/ml, 4,000 units, IV, at 10:19  Nitroglycerin (200mcg/ml), 200 mcg, at 10:19  Heparin 1000 units/ml, 6,000 units, IV, at 10:27  CONTRAST GIVEN: 75 ml Omnipaque (350mg I /ml)  RADIATION EXPOSURE:  Fluoroscopy time: 3 7 min  HEMODYNAMICS: Hemodynamic assessment demonstrated mildly elevated LVEDP  CORONARY VESSELS:   --  The coronary circulation is right dominant  --  There was 1-vessel coronary artery disease  --  Left main: Normal   --  LAD: Angiography showed minor luminal irregularities  --  1st obtuse marginal: There was a 90 % stenosis  Remainder circumflex nonobstructive  --  RCA: Angiography showed minor luminal irregularities  IMPRESSIONS:  PCI of OM with RORY    RECOMMENDATIONS  asa 81 indefinitely, plavix x 1 year    DISPOSITION:  The patient left the catheterization laboratory in stable condition  Prepared and signed by    Misha Lang DO  Signed 06/10/2019 10:41:34    Study diagram    Angiographic findings  Native coronary lesions:  ï¾·OM1: Lesion 1: 90 % stenosis  Intervention results  Native coronary lesions:  ï¾·drug-eluting stent and balloon angioplasty of the 90 % stenosis in OM1  Appearance excellent with 0 % residual stenosis  Stent: [place] Cutter Rx 3 25 x 15mm drug-eluting      Hemodynamic tables    Pressures:  Baseline  Pressures:  - HR: 70  Pressures:  - Rhythm:  Pressures:  -- Aortic Pressure (S/D/M): 119/35/95  Pressures:  -- Left Ventricle (s/edp): 106/19/--    Outputs:  Baseline  Outputs:  -- CALCULATIONS: [age]  Outputs:  -- CALCULATIONS: Body Surface Area: 2 25  Outputs:  -- CALCULATIONS: Height in cm: 185 00  Outputs:  -- CALCULATIONS: Sex: Male  Outputs:  -- CALCULATIONS: Weight in k 00    --------------------------------------------------------------------------------  ECHO:   Results for orders placed during the hospital encounter of 06/10/19    Echo complete with contrast if indicated    Narrative  [facility] [address]  [place], [address]  [phone]    Transthoracic Echocardiogram  2D, M-mode, Doppler, and Color Doppler    Study date:  2019    Patient: Seelna Caldera  MR number: [id number]  Account number: [de-identified]  : 1970  Age: 51 years  Gender: Male  Status: Inpatient  Location: Bedside  Height: 73 in  Weight: 223 lb  BP: 116/ 75 mmHg    Indications: NSTEMI    Diagnoses: I21 4 - Non-ST elevation (NSTEMI) myocardial infarction    Sonographer:  Thi Cordero  Primary Physician:  ELEANOR Perales  Referring Physician:  Ricky Saini MD  Group:  [facility]  Interpreting Physician:  Doris Horner MD    SUMMARY    LEFT VENTRICLE:  Normal left ventricular systolic function, EF 90%  Normal left ventricular cavity size  Normal left ventricular wall thickness  Normal left ventricular wall motion without regional wall motion abnormalities  Normal left ventricular  diastolic function  Normal left atrial pressures and left ventricular filling pressures  LEFT ATRIUM:  Mild left atrial enlargement  MITRAL VALVE:  There was trace regurgitation  TRICUSPID VALVE:  There was mild regurgitation  HISTORY: PRIOR HISTORY: NSTEMI, tobacco use  PROCEDURE: The procedure was performed at the bedside  This was a routine study  The transthoracic approach was used  The study included complete 2D imaging, M-mode, complete spectral Doppler, and color Doppler  The heart rate was 54 bpm,  at the start of the study  Images were obtained from the parasternal, apical, subcostal, and suprasternal notch acoustic windows  Echocardiographic views were limited due to high windows and lung interference  Image quality was adequate  LEFT VENTRICLE: Normal left ventricular systolic function, EF 10%  Normal left ventricular cavity size  Normal left ventricular wall thickness   Normal left ventricular wall motion without regional wall motion abnormalities  Normal left  ventricular diastolic function  Normal left atrial pressures and left ventricular filling pressures  RIGHT VENTRICLE: The size was normal  Systolic function was normal  Wall thickness was normal     LEFT ATRIUM: Mild left atrial enlargement  RIGHT ATRIUM: Size was normal     MITRAL VALVE: Valve structure was normal  There was normal leaflet separation  DOPPLER: The transmitral velocity was within the normal range  There was no evidence for stenosis  There was trace regurgitation  AORTIC VALVE: The valve was trileaflet  Leaflets exhibited normal thickness and normal cuspal separation  DOPPLER: Transaortic velocity was within the normal range  There was no evidence for stenosis  There was no regurgitation  TRICUSPID VALVE: The valve structure was normal  There was normal leaflet separation  DOPPLER: The transtricuspid velocity was within the normal range  There was no evidence for stenosis  There was mild regurgitation  PULMONIC VALVE: DOPPLER: The transpulmonic velocity was within the normal range  There was no regurgitation  PERICARDIUM: There was no pericardial effusion  The pericardium was normal in appearance  AORTA: The root exhibited normal size  PULMONARY ARTERY: The size was normal  DOPPLER: Systolic pressure was within the normal range      SYSTEM MEASUREMENT TABLES    2D  %FS: 37 3 %  Ao Diam: 2 9 cm  EDV(Teich): 137 8 ml  EF(Teich): 66 8 %  ESV(Teich): 45 7 ml  IVSd: 0 9 cm  LA Area: 21 1 cm2  LA Diam: 4 1 cm  LVEDV MOD A4C: 135 2 ml  LVEF MOD A4C: 57 9 %  LVESV MOD A4C: 56 9 ml  LVIDd: 5 3 cm  LVIDs: 3 3 cm  LVLd A4C: 8 8 cm  LVLs A4C: 7 5 cm  LVPWd: 0 9 cm  RA Area: 14 9 cm2  RVIDd: 2 9 cm  SV MOD A4C: 78 3 ml  SV(Teich): 92 1 ml    CW  AV Vmax: 1 4 m/s  AV maxP 9 mmHg  TR Vmax: 2 2 m/s  TR maxP 9 mmHg    MM  TAPSE: 2 cm    PW  E': 0 1 m/s  E/E': 10 5  MV A Eldon: 0 5 m/s  MV Dec Rusk: 5 1 m/s2  MV DecT: 157 6 ms  MV E Eldon: 0 8 m/s  MV E/A Ratio: 1 5  MV PHT: 45 7 ms  MVA By PHT: 4 8 cm2    [address] Accredited Echocardiography Laboratory    Prepared and electronically signed by    Shai Bo MD  Signed 11-Jun-2019 16:36:31    No results found for this or any previous visit     --------------------------------------------------------------------------------  HOLTER  No results found for this or any previous visit  No results found for this or any previous visit     --------------------------------------------------------------------------------  CAROTIDS  No results found for this or any previous visit      --------------------------------------------------------------------------------  Diagnoses and all orders for this visit:    Coronary artery disease involving native coronary artery of native heart without angina pectoris  -     NM myocardial perfusion spect (stress and/or rest); Future    History of non-ST elevation myocardial infarction (NSTEMI)  -     POCT ECG  -     ezetimibe (ZETIA) 10 mg tablet; Take 1 tablet (10 mg total) by mouth daily  -     Lipid Panel with Direct LDL reflex; Future    Chest pain, unspecified type  -     NM myocardial perfusion spect (stress and/or rest); Future  -     ezetimibe (ZETIA) 10 mg tablet; Take 1 tablet (10 mg total) by mouth daily  -     Lipid Panel with Direct LDL reflex; Future    Hypercholesterolemia  -     ezetimibe (ZETIA) 10 mg tablet; Take 1 tablet (10 mg total) by mouth daily  -     Lipid Panel with Direct LDL reflex; Future    MARADIAGA (dyspnea on exertion)  -     NM myocardial perfusion spect (stress and/or rest);  Future     ======================================================    Past Medical History:   Diagnosis Date   • Hyperlipidemia    • Hypertension    • MI (myocardial infarction) ([facility] [address] )      Past Surgical History:   Procedure Laterality Date   • CORONARY ANGIOPLASTY WITH STENT PLACEMENT     • HAND SURGERY           Medications  Current Outpatient Medications Medication Sig Dispense Refill   • Aspirin Low Dose 81 MG chewable tablet CHEW 1 TABLET BY MOUTH DAILY 90 tablet 3   • atorvastatin (LIPITOR) 80 mg tablet TAKE 1 TABLET BY MOUTH DAILY WITH DINNER 90 tablet 0   • Diclofenac Sodium (VOLTAREN) 1 % Apply 2 g topically 4 (four) times a day 100 g 5   • ezetimibe (ZETIA) 10 mg tablet Take 1 tablet (10 mg total) by mouth daily 90 tablet 3   • meclizine (ANTIVERT) 25 mg tablet Take 1 tablet (25 mg total) by mouth 3 (three) times a day as needed for dizziness 30 tablet 5   • methocarbamol (ROBAXIN) 500 mg tablet TAKE 1 TABLET (500 MG TOTAL) BY MOUTH 2 (TWO) TIMES A DAY AS NEEDED FOR MUSCLE SPASMS 20 tablet 0   • metoprolol succinate (TOPROL-XL) 25 mg 24 hr tablet Take 1 tablet (25 mg total) by mouth daily 30 tablet 5   • nitroglycerin (NITROSTAT) 0 4 mg SL tablet Place 1 tablet (0 4 mg total) under the tongue every 5 (five) minutes as needed for chest pain 25 tablet 3   • Blood Pressure Monitoring KIT by Does not apply route 2 (two) times a day (Patient not taking: Reported on 8/4/2021) 1 kit 0   • naproxen (NAPROSYN) 375 mg tablet Take 1 tablet (375 mg total) by mouth 2 (two) times a day with meals (Patient not taking: Reported on 2/8/2022) 20 tablet 0     No current facility-administered medications for this visit          No Known Allergies    Social History     Socioeconomic History   • Marital status: /Civil Union     Spouse name: Not on file   • Number of children: Not on file   • Years of education: Not on file   • Highest education level: Not on file   Occupational History   • Not on file   Tobacco Use   • Smoking status: Every Day     Packs/day: 1 00     Types: Cigarettes   • Smokeless tobacco: Never   Vaping Use   • Vaping Use: Never used   Substance and Sexual Activity   • Alcohol use: Yes     Comment: occasional   • Drug use: Not Currently   • Sexual activity: Yes     Partners: Female   Other Topics Concern   • Not on file   Social History Narrative   • Not on file     Social Determinants of Health     Financial Resource Strain: Not on file   Food Insecurity: Not on file   Transportation Needs: Not on file   Physical Activity: Not on file   Stress: Not on file   Social Connections: Not on file   Intimate Partner Violence: Not on file   Housing Stability: Not on file        Family History   Problem Relation Age of Onset   • No Known Problems Mother    • No Known Problems Father        Lab Results   Component Value Date    WBC 7 00 02/08/2022    HGB 16 3 02/08/2022    HCT 50 8 (H) 02/08/2022    MCV 96 02/08/2022     02/08/2022      Lab Results   Component Value Date    SODIUM 138 02/08/2022    K 4 1 02/08/2022     (H) 02/08/2022    CO2 22 02/08/2022    BUN 15 02/08/2022    CREATININE 1 10 02/08/2022    GLUC 102 11/04/2021    CALCIUM 9 2 02/08/2022      Lab Results   Component Value Date    HGBA1C 5 7 (H) 02/08/2022      Lab Results   Component Value Date    CHOL 225 10/17/2014     Lab Results   Component Value Date    HDL 40 02/08/2022    HDL 36 (L) 09/10/2019    HDL 34 (L) 06/10/2019     Lab Results   Component Value Date    LDLCALC 99 02/08/2022    LDLCALC 87 09/10/2019    LDLCALC 158 (H) 06/10/2019     Lab Results   Component Value Date    TRIG 124 02/08/2022    TRIG 93 09/10/2019    TRIG 157 (H) 06/10/2019     No results found for: CHOLHDL   Lab Results   Component Value Date    INR 1 05 01/24/2021    INR 1 06 04/12/2020    INR 1 03 06/09/2019    PROTIME 13 5 01/24/2021    PROTIME 13 8 04/12/2020    PROTIME 13 0 06/09/2019          Patient Active Problem List    Diagnosis Date Noted   • Chronic bilateral thoracic back pain 02/08/2022   • Dizziness 02/08/2022   • Coronary artery disease involving native coronary artery of native heart without angina pectoris 02/08/2022   • Chronic pain syndrome 07/23/2021   • Thoracic disc herniation 07/23/2021   • Thoracic radiculopathy 07/23/2021   • Chest pain 01/24/2021   • Prediabetes 06/17/2019   • Hypercholesterolemia 06/11/2019   • History of non-ST elevation myocardial infarction (NSTEMI) 06/10/2019   • Tobacco abuse 06/10/2019   • Blurry vision, bilateral 10/10/2014   • Fatigue 10/10/2014   • Overweight (BMI 25 0-29 9) 10/10/2014       Portions of the record may have been created with voice recognition software  Occasional wrong word or "sound a like" substitutions may have occurred due to the inherent limitations of voice recognition software  Read the chart carefully and recognize, using context, where substitutions have occurred      Filiberto Correia DO, [facility] [place]  12/2/2022 10:46 AM

## 2022-12-13 ENCOUNTER — TELEPHONE (OUTPATIENT)
Dept: CARDIOLOGY CLINIC | Facility: CLINIC | Age: 52
End: 2022-12-13

## 2022-12-13 NOTE — TELEPHONE ENCOUNTER
Cathy Hicks from cardiology in [address] [employer] called to ask if patient to hold toprol for stress tomorrow   per Dr Pauly Ballesteros yes  Returned call with response

## 2022-12-14 ENCOUNTER — HOSPITAL ENCOUNTER (OUTPATIENT)
Dept: NUCLEAR MEDICINE | Facility: HOSPITAL | Age: 52
Discharge: HOME | End: 2022-12-14

## 2022-12-14 DIAGNOSIS — R06.09 DOE (DYSPNEA ON EXERTION): ICD-10-CM

## 2022-12-14 DIAGNOSIS — R07.9 CHEST PAIN, UNSPECIFIED TYPE: ICD-10-CM

## 2022-12-14 DIAGNOSIS — I25.10 CORONARY ARTERY DISEASE INVOLVING NATIVE CORONARY ARTERY OF NATIVE HEART WITHOUT ANGINA PECTORIS: ICD-10-CM

## 2022-12-14 LAB
MAX HR PERCENT: 83 %
MAX HR: 139 BPM
NUC STRESS EJECTION FRACTION: 58 %
RATE PRESSURE PRODUCT: NORMAL
SL CV REST NUCLEAR ISOTOPE DOSE: 10.5 MCI
SL CV STRESS NUCLEAR ISOTOPE DOSE: 32.7 MCI
SL CV STRESS RECOVERY BP: NORMAL MMHG
SL CV STRESS RECOVERY HR: 96 BPM
SL CV STRESS RECOVERY O2 SAT: 98 %
SL CV STRESS STAGE REACHED: 4
STRESS ANGINA INDEX: 0
STRESS BASELINE BP: NORMAL MMHG
STRESS BASELINE HR: 72 BPM
STRESS DUKE TREADMILL SCORE: 12
STRESS O2 SAT REST: 99 %
STRESS PEAK HR: 139 BPM
STRESS POST ESTIMATED WORKLOAD: 13.4 METS
STRESS POST EXERCISE DUR MIN: 11 MIN
STRESS POST EXERCISE DUR SEC: 46 SEC
STRESS POST O2 SAT PEAK: 98 %
STRESS POST PEAK BP: 118 MMHG
STRESS ST DEPRESSION: 0 MM
STRESS/REST PERFUSION RATIO: 0.89

## 2022-12-16 ENCOUNTER — TELEPHONE (OUTPATIENT)
Dept: CARDIOLOGY CLINIC | Facility: CLINIC | Age: 52
End: 2022-12-16

## 2022-12-16 LAB
CHEST PAIN STATEMENT: NORMAL
MAX DIASTOLIC BP: 72 MMHG
MAX HEART RATE: 139 BPM
MAX PREDICTED HEART RATE: 167 BPM
MAX. SYSTOLIC BP: 120 MMHG
PROTOCOL NAME: NORMAL
TARGET HR FORMULA: NORMAL
TEST INDICATION: NORMAL
TIME IN EXERCISE PHASE: NORMAL

## 2022-12-16 NOTE — TELEPHONE ENCOUNTER
First Attempt to reach Mr Chester Ireland, we tried the home number first and this was no longer in service  Tried the mobile number and didn't ring went straight to voice notification stating that their voicemail is not set up and to call back later

## 2022-12-30 DIAGNOSIS — I25.2 HISTORY OF NON-ST ELEVATION MYOCARDIAL INFARCTION (NSTEMI): ICD-10-CM

## 2022-12-30 RX ORDER — METOPROLOL SUCCINATE 25 MG/1
25 TABLET, EXTENDED RELEASE ORAL DAILY
Qty: 90 TABLET | Refills: 2 | Status: SHIPPED | OUTPATIENT
Start: 2022-12-30

## 2023-05-19 ENCOUNTER — TELEPHONE (OUTPATIENT)
Dept: FAMILY MEDICINE CLINIC | Facility: CLINIC | Age: 53
End: 2023-05-19

## 2023-05-19 NOTE — TELEPHONE ENCOUNTER
----- Message from Pollo Butterfield MD sent at 5/18/2023  1:49 PM EDT -----  Can you see if he received the shingles vaccine? I sent him an rx

## 2023-05-25 NOTE — TELEPHONE ENCOUNTER
Tried calling patient x 3  Unable to contact  Patient had letter mailed out beginning of month to contact office for preventative care also

## 2023-06-14 DIAGNOSIS — I25.2 HISTORY OF NON-ST ELEVATION MYOCARDIAL INFARCTION (NSTEMI): ICD-10-CM

## 2023-06-14 DIAGNOSIS — I25.10 CORONARY ARTERY DISEASE INVOLVING NATIVE CORONARY ARTERY OF NATIVE HEART WITHOUT ANGINA PECTORIS: ICD-10-CM

## 2023-06-14 DIAGNOSIS — E78.00 HYPERCHOLESTEROLEMIA: ICD-10-CM

## 2023-06-14 DIAGNOSIS — R07.9 CHEST PAIN, UNSPECIFIED TYPE: ICD-10-CM

## 2023-06-14 RX ORDER — EZETIMIBE 10 MG/1
10 TABLET ORAL DAILY
Qty: 90 TABLET | Refills: 1 | Status: SHIPPED | OUTPATIENT
Start: 2023-06-14

## 2023-06-14 RX ORDER — NITROGLYCERIN 0.4 MG/1
0.4 TABLET SUBLINGUAL
Qty: 25 TABLET | Refills: 3 | Status: SHIPPED | OUTPATIENT
Start: 2023-06-14

## 2023-06-14 RX ORDER — ATORVASTATIN CALCIUM 80 MG/1
80 TABLET, FILM COATED ORAL
Qty: 90 TABLET | Refills: 0 | Status: SHIPPED | OUTPATIENT
Start: 2023-06-14

## 2023-06-14 RX ORDER — METOPROLOL SUCCINATE 25 MG/1
25 TABLET, EXTENDED RELEASE ORAL DAILY
Qty: 90 TABLET | Refills: 1 | Status: SHIPPED | OUTPATIENT
Start: 2023-06-14

## 2023-08-03 ENCOUNTER — OFFICE VISIT (OUTPATIENT)
Dept: CARDIOLOGY CLINIC | Facility: CLINIC | Age: 53
End: 2023-08-03
Payer: COMMERCIAL

## 2023-08-03 VITALS
SYSTOLIC BLOOD PRESSURE: 116 MMHG | HEIGHT: 73 IN | WEIGHT: 225 LBS | HEART RATE: 72 BPM | OXYGEN SATURATION: 96 % | DIASTOLIC BLOOD PRESSURE: 80 MMHG | BODY MASS INDEX: 29.82 KG/M2

## 2023-08-03 DIAGNOSIS — R42 DIZZINESS: ICD-10-CM

## 2023-08-03 DIAGNOSIS — R07.9 CHEST PAIN, UNSPECIFIED TYPE: ICD-10-CM

## 2023-08-03 DIAGNOSIS — I25.2 HISTORY OF NON-ST ELEVATION MYOCARDIAL INFARCTION (NSTEMI): ICD-10-CM

## 2023-08-03 DIAGNOSIS — E78.00 HYPERCHOLESTEROLEMIA: ICD-10-CM

## 2023-08-03 DIAGNOSIS — I25.10 CORONARY ARTERY DISEASE INVOLVING NATIVE CORONARY ARTERY OF NATIVE HEART WITHOUT ANGINA PECTORIS: Primary | ICD-10-CM

## 2023-08-03 DIAGNOSIS — I21.4 NSTEMI (NON-ST ELEVATED MYOCARDIAL INFARCTION) (HCC): ICD-10-CM

## 2023-08-03 PROCEDURE — 99214 OFFICE O/P EST MOD 30 MIN: CPT

## 2023-08-03 PROCEDURE — 99406 BEHAV CHNG SMOKING 3-10 MIN: CPT

## 2023-08-03 RX ORDER — METOPROLOL SUCCINATE 25 MG/1
25 TABLET, EXTENDED RELEASE ORAL DAILY
Qty: 90 TABLET | Refills: 3 | Status: SHIPPED | OUTPATIENT
Start: 2023-08-03

## 2023-08-03 RX ORDER — NITROGLYCERIN 0.4 MG/1
0.4 TABLET SUBLINGUAL
Qty: 25 TABLET | Refills: 3 | Status: SHIPPED | OUTPATIENT
Start: 2023-08-03

## 2023-08-03 RX ORDER — ASPIRIN 81 MG/1
81 TABLET, CHEWABLE ORAL DAILY
Qty: 90 TABLET | Refills: 3 | Status: SHIPPED | OUTPATIENT
Start: 2023-08-03

## 2023-08-03 RX ORDER — ATORVASTATIN CALCIUM 80 MG/1
80 TABLET, FILM COATED ORAL
Qty: 90 TABLET | Refills: 3 | Status: SHIPPED | OUTPATIENT
Start: 2023-08-03

## 2023-08-03 RX ORDER — EZETIMIBE 10 MG/1
10 TABLET ORAL DAILY
Qty: 90 TABLET | Refills: 3 | Status: SHIPPED | OUTPATIENT
Start: 2023-08-03

## 2023-08-03 RX ORDER — MECLIZINE HYDROCHLORIDE 25 MG/1
25 TABLET ORAL 3 TIMES DAILY PRN
Qty: 90 TABLET | Refills: 3 | Status: SHIPPED | OUTPATIENT
Start: 2023-08-03

## 2023-08-03 NOTE — PROGRESS NOTES
Cardiology   MD Claudette Alatorre MD, Mansoor Douglass DO, NITIN Mojica MD Edris Abbott, DO, Edna Ordoñez DO, [facility] - [facility]  -------------------------------------------------------------------  [facility]  [address] [address] [phone]  [phone]  08/03/23  Derek Bell  YOB: 1970   MRN: [id number]      Referring Physician: Carl Vogel PA-C  [address] [facility],  [address] [place] [address]     HPI: Derek Bell is a 47 y.o. male with:   coronary artery disease diagnosed in setting of a non ST segment elevation myocardial infarction in 2019 with drug-eluting stent x1 to 1st obtuse marginal, dyslipidemia who presents today for follow-up. He continues to smoke. He had a stress test at the end of last year which was negative for ischemia. Notes no significant angina symptoms at this time    Review of Systems   Constitutional: Negative for chills and fever. HENT: Negative for facial swelling and sore throat. Eyes: Negative for visual disturbance. Respiratory: Negative for cough, chest tightness, shortness of breath and wheezing. Cardiovascular: Negative for chest pain, palpitations and leg swelling. Gastrointestinal: Negative for abdominal pain, blood in stool, constipation, diarrhea, nausea and vomiting. Endocrine: Negative for cold intolerance and heat intolerance. Genitourinary: Negative for decreased urine volume, difficulty urinating, dysuria and hematuria. Musculoskeletal: Negative for arthralgias, back pain and myalgias. Skin: Negative for rash. Neurological: Negative for dizziness, syncope, weakness and numbness. Psychiatric/Behavioral: Negative for agitation, behavioral problems and confusion. The patient is not nervous/anxious.          OBJECTIVE  Vitals:    08/03/23 1515   BP: 116/80   Pulse: 72   SpO2: 96%       Physical Exam  Constitutional: awake, alert and oriented, in no acute distress, no obvious deformities  Head: Normocephalic, without obvious abnormality, atraumatic  Eyes: conjunctivae clear and moist. Sclera anicteric. No xanthelasmas. Pupils equal bilaterally. Extraocular motions are full. Ear nose mouth and throat: ears are symmetrical bilaterally, hearing appears to be equal bilaterally, no nasal discharge or epistaxis, oropharynx is clear with moist mucous membranes  Neck: Trachea is midline, neck is supple, no thyromegaly or significant lymphadenopathy, there is full range of motion. Lungs: clear to auscultation bilaterally, no wheezes, no rales, no rhonchi, no accessory muscle use, breathing is nonlabored  Heart: regular rate and rhythm, S1, S2 normal, no murmur, no click, no rub and no gallop, no lower extremity edema  Abdomen: soft, non-tender; bowel sounds normal; no masses, no organomegaly  Psychiatric: Patient is oriented to time, place, person, mood/affect is negative for depression, anxiety, agitation, appears to have appropriate insight  Skin: Skin is warm, dry, intact. No obvious rashes or lesions on exposed extremities. Nail beds are pink with no cyanosis or clubbing. EKG:  No results found for this visit on 08/03/23.      IMPRESSION:  Coronary artery disease status post PCI to the 1st obtuse marginal in June 2019  Dyslipidemia  Tobacco abuse  Mild valvular heart disease seen on echo in 2019    DISCUSSION/RECOMMENDATIONS:  His stress test last year was negative  His blood pressure is controlled  His lipids were not to goal, added Zetia at his last office visit but he never had follow-up lipid panel, reordered lipid panel today, recent refills for his medications today  Unfortunately continues to smoke, discussed smoking cessation x3 minutes today and how this will greatly improve his cardiovascular health  Encouraged heart healthy diet, low-cholesterol type diet  We will follow-up with him after his repeat lipid panel    [place], [address] [facility]  --------------------------------------------------------------------------------  TREADMILL STRESS  No results found for this or any previous visit.     ----------------------------------------------------------------------------------------------  NUCLEAR STRESS TEST: Results for orders placed during the hospital encounter of 22    NM myocardial perfusion spect (stress and/or rest)    Interpretation Summary  •  Stress ECG: No ST deviation is noted. The ECG was negative for ischemia. The stress ECG is negative for ischemia after submaximal exercise, without reproduction of symptoms of chest pain. •  Perfusion: There is a left ventricular perfusion defect that is medium in size with mild reduction in uptake present in the basal to mid inferior location(s) that is fixed with normal myocardial thickening. The defect appears to be an artifact caused by diaphragmatic activity. •  Stress Function: Left ventricular function post-stress is normal. Post-stress ejection fraction is 58 %. •  Stress Combined Conclusion: The ECG and SPECT imaging portions of the stress study are concordant with no evidence of stress induced myocardial ischemia. There is image artifact, without diagnostic evidence for perfusion abnormality. No results found for this or any previous visit.      --------------------------------------------------------------------------------  CATH:  Results for orders placed during the hospital encounter of 06/10/19    Cardiac catheterization    Narrative  [address] [facility].   [address]  [phone]    [facility]    Invasive Cardiovascular Lab Complete Report    Patient: [place] Pete  MR number: [id number]  Account number: [de-identified]  Study date: 06/10/2019  Gender: Male  : 1970  Height: 72.8 in  Weight: 222.2 lb  BSA: 2.25 mï¾²    Allergies: NO KNOWN ALLERGIES    Diagnostic Cardiologist:  Isabella Rai,   Interventional Cardiologist:  Raquel Farmer DO  Primary Physician:  ELEANOR Geronimo    SUMMARY    CORONARY CIRCULATION:  There was 1-vessel coronary artery disease. Left main: Normal.  1st obtuse marginal: There was a 90 % stenosis. Remainder circumflex nonobstructive    HEMODYNAMICS:  Hemodynamic assessment demonstrated mildly elevated LVEDP. 1ST LESION INTERVENTIONS:  A drug-eluting stent with balloon angioplasty procedure was performed on the 90 % lesion in the 1st obtuse marginal. Following intervention there was an excellent angiographic appearance with a 0 % residual stenosis. A Xience Alba Rx 3.25 x 15mm drug-eluting stent was placed across the lesion and deployed at a maximum inflation pressure of 14 rich. INDICATIONS:  --  Possible CAD: myocardial infarction without ST elevation (NSTEMI). PROCEDURES PERFORMED    --  Left heart catheterization without ventriculogram.  --  Right coronary angiography. --  Left coronary angiography. --  Inpatient. --  Mod Sedation Same Physician Initial 15min. --  Mod Sedation Same Physician Add 15min. --  Coronary Catheterization (w/ LHC). --  Coronary Drug Eluting Stent w/PTCA. --  Intervention on OM1: drug-eluting stent, balloon angioplasty. PROCEDURE: The risks and alternatives of the procedures and conscious sedation were explained to the patient and informed consent was obtained. The patient was brought to the cath lab and placed on the table. The planned puncture sites  were prepped and draped in the usual sterile fashion. --  Right radial artery access. After performing an Juliano's test to verify adequate ulnar artery supply to the hand, the radial site was prepped. The puncture site was infiltrated with local anesthetic. The vessel was accessed using the  modified Seldinger technique, a wire was advanced into the vessel, and a sheath was advanced over the wire into the vessel.     --  Left heart catheterization without ventriculogram. A catheter was advanced over a guidewire into the ascending aorta. After recording ascending aortic pressure, the catheter was advanced across the aortic valve and left ventricular  pressure was recorded. The catheter was pulled back across the aortic valve and into the ascending aorta and pullback pressures were obtained. --  Right coronary artery angiography. A catheter was advanced over a guidewire into the aorta and positioned in the right coronary artery ostium under fluoroscopic guidance. Angiography was performed. --  Left coronary artery angiography. A catheter was advanced over a guidewire into the aorta and positioned in the left coronary artery ostium under fluoroscopic guidance. Angiography was performed. --  Inpatient. --  Mod Sedation Same Physician Initial 15min. --  Mod Sedation Same Physician Add 15min. --  Coronary Catheterization (w/ LHC). LESION INTERVENTION: A drug-eluting stent with balloon angioplasty procedure was performed on the 90 % lesion in the 1st obtuse marginal. Following intervention there was an excellent angiographic appearance with a 0 % residual stenosis. This was an ACC/AHA type B "moderate risk" lesion for intervention. There was SUSAN 3 flow before the procedure and SUSAN 3 flow after the procedure. There was no dissection. --  Vessel setup was performed. A Launcher 6Fr Ebu 3.5 guiding catheter was used to cannulate the vessel. --  Vessel setup was performed. A BMW . 014 190cm wire was used to cross the lesion. --  A Xience Alba Rx 3.25 x 15mm drug-eluting stent was placed across the lesion and deployed at a maximum inflation pressure of 14 rich. --  Balloon angioplasty was performed, using a NC Trek Rx 3.5 x 15mm balloon, with 1 inflations and a maximum inflation pressure of 12 rich. INTERVENTIONS:  --  Coronary Drug Eluting Stent w/PTCA.     PROCEDURE COMPLETION: The patient tolerated the procedure well and was discharged from the cath lab. TIMING: Test started at 10:11. Test concluded at 10:38. HEMOSTASIS: The sheath was removed. The site was compressed with a Hemoband  device. Hemostasis was obtained. MEDICATIONS GIVEN: Baby Aspirin, 324 mg, PO, at 10:12. Fentanyl (1OOmcg/2 ml), 50 mcg, IV, at 10:19. Versed (2mg/2ml), 2 mg, IV, at 10:19. 1% Lidocaine, 1 ml, subcutaneously, at 10:19. Verapamil (5mg/2ml),  2.5 mg, IV, at 10:19. Heparin 1000 units/ml, 4,000 units, IV, at 10:19. Nitroglycerin (200mcg/ml), 200 mcg, at 10:19. Heparin 1000 units/ml, 6,000 units, IV, at 10:27. CONTRAST GIVEN: 75 ml Omnipaque (350mg I /ml). RADIATION EXPOSURE:  Fluoroscopy time: 3.7 min. HEMODYNAMICS: Hemodynamic assessment demonstrated mildly elevated LVEDP. CORONARY VESSELS:   --  The coronary circulation is right dominant. --  There was 1-vessel coronary artery disease. --  Left main: Normal.  --  LAD: Angiography showed minor luminal irregularities. --  1st obtuse marginal: There was a 90 % stenosis. Remainder circumflex nonobstructive  --  RCA: Angiography showed minor luminal irregularities. IMPRESSIONS:  PCI of OM with RORY    RECOMMENDATIONS  asa 81 indefinitely, plavix x 1 year    DISPOSITION:  The patient left the catheterization laboratory in stable condition. Prepared and signed by    Pb Moralez DO  Signed 06/10/2019 10:41:34    Study diagram    Angiographic findings  Native coronary lesions:  ï¾·OM1: Lesion 1: 90 % stenosis. Intervention results  Native coronary lesions:  ï¾·drug-eluting stent and balloon angioplasty of the 90 % stenosis in OM1. Appearance excellent with 0 % residual stenosis. Stent: Kathaleen Libel Rx 3.25 x 15mm drug-eluting.     Hemodynamic tables    Pressures:  Baseline  Pressures:  - HR: 70  Pressures:  - Rhythm:  Pressures:  -- Aortic Pressure (S/D/M): 119/35/95  Pressures:  -- Left Ventricle (s/edp): 106/19/--    Outputs:  Baseline  Outputs:  -- CALCULATIONS[age]  Outputs:  -- CALCULATIONS: Body Surface Area: 2.25  Outputs:  -- CALCULATIONS: Height in cm: 185.00  Outputs:  -- CALCULATIONS: Sex: Male  Outputs:  -- CALCULATIONS: Weight in k.00    --------------------------------------------------------------------------------  ECHO:   Results for orders placed during the hospital encounter of 06/10/19    Echo complete with contrast if indicated    Narrative  [address] [facility]. [address]  [phone]    Transthoracic Echocardiogram  2D, M-mode, Doppler, and Color Doppler    Study date:  2019    Patient: Myriam Dee  MR number: [id number]  Account number: [de-identified]  : 1970  Age: 51 years  Gender: Male  Status: Inpatient  Location: Bedside  Height: 73 in  Weight: 223 lb  BP: 116/ 75 mmHg    Indications: NSTEMI    Diagnoses: I21.4 - Non-ST elevation (NSTEMI) myocardial infarction    Sonographer:  Rubio Harris  Primary Physician:  ELEANOR Ordaz  Referring Physician:  Noemi Kuhn MD  Group:  [facility]  Interpreting Physician:  Karina Baptiste MD    SUMMARY    LEFT VENTRICLE:  Normal left ventricular systolic function, EF 24%. Normal left ventricular cavity size. Normal left ventricular wall thickness. Normal left ventricular wall motion without regional wall motion abnormalities. Normal left ventricular  diastolic function. Normal left atrial pressures and left ventricular filling pressures. LEFT ATRIUM:  Mild left atrial enlargement. MITRAL VALVE:  There was trace regurgitation. TRICUSPID VALVE:  There was mild regurgitation. HISTORY: PRIOR HISTORY: NSTEMI, tobacco use. PROCEDURE: The procedure was performed at the bedside. This was a routine study. The transthoracic approach was used. The study included complete 2D imaging, M-mode, complete spectral Doppler, and color Doppler. The heart rate was 54 bpm,  at the start of the study.  Images were obtained from the parasternal, apical, subcostal, and suprasternal notch acoustic windows. Echocardiographic views were limited due to high windows and lung interference. Image quality was adequate. LEFT VENTRICLE: Normal left ventricular systolic function, EF 85%. Normal left ventricular cavity size. Normal left ventricular wall thickness. Normal left ventricular wall motion without regional wall motion abnormalities. Normal left  ventricular diastolic function. Normal left atrial pressures and left ventricular filling pressures. RIGHT VENTRICLE: The size was normal. Systolic function was normal. Wall thickness was normal.    LEFT ATRIUM: Mild left atrial enlargement. RIGHT ATRIUM: Size was normal.    MITRAL VALVE: Valve structure was normal. There was normal leaflet separation. DOPPLER: The transmitral velocity was within the normal range. There was no evidence for stenosis. There was trace regurgitation. AORTIC VALVE: The valve was trileaflet. Leaflets exhibited normal thickness and normal cuspal separation. DOPPLER: Transaortic velocity was within the normal range. There was no evidence for stenosis. There was no regurgitation. TRICUSPID VALVE: The valve structure was normal. There was normal leaflet separation. DOPPLER: The transtricuspid velocity was within the normal range. There was no evidence for stenosis. There was mild regurgitation. PULMONIC VALVE: DOPPLER: The transpulmonic velocity was within the normal range. There was no regurgitation. PERICARDIUM: There was no pericardial effusion. The pericardium was normal in appearance. AORTA: The root exhibited normal size. PULMONARY ARTERY: The size was normal. DOPPLER: Systolic pressure was within the normal range.     SYSTEM MEASUREMENT TABLES    2D  %FS: 37.3 %  Ao Diam: 2.9 cm  EDV(Teich): 137.8 ml  EF(Teich): 66.8 %  ESV(Teich): 45.7 ml  IVSd: 0.9 cm  LA Area: 21.1 cm2  LA Diam: 4.1 cm  LVEDV MOD A4C: 135.2 ml  LVEF MOD A4C: 57.9 %  LVESV MOD A4C: 56.9 ml  LVIDd: 5.3 cm  LVIDs: 3.3 cm  LVLd A4C: 8.8 cm  LVLs A4C: 7.5 cm  LVPWd: 0.9 cm  RA Area: 14.9 cm2  RVIDd: 2.9 cm  SV MOD A4C: 78.3 ml  SV(Teich): 92.1 ml    CW  AV Vmax: 1.4 m/s  AV maxP.9 mmHg  TR Vmax: 2.2 m/s  TR maxP.9 mmHg    MM  TAPSE: 2 cm    PW  E': 0.1 m/s  E/E': 10.5  MV A [place]: 0.5 m/s  MV Dec [place]: 5.1 m/s2  MV DecT: 157.6 ms  MV E [place]: 0.8 m/s  MV E/A Ratio: 1.5  MV PHT: 45.7 ms  MVA By PHT: 4.8 cm2    [facility]    Prepared and electronically signed by    Brock Garay MD  Signed 2019 16:36:31    No results found for this or any previous visit.    --------------------------------------------------------------------------------  HOLTER  No results found for this or any previous visit. No results found for this or any previous visit.    --------------------------------------------------------------------------------  CAROTIDS  No results found for this or any previous visit.     --------------------------------------------------------------------------------  Diagnoses and all orders for this visit:    Coronary artery disease involving native coronary artery of native heart without angina pectoris  -     Lipid Panel with Direct LDL reflex; Future  -     atorvastatin (LIPITOR) 80 mg tablet; Take 1 tablet (80 mg total) by mouth daily with dinner    History of non-ST elevation myocardial infarction (NSTEMI)  -     ezetimibe (ZETIA) 10 mg tablet; Take 1 tablet (10 mg total) by mouth daily  -     atorvastatin (LIPITOR) 80 mg tablet; Take 1 tablet (80 mg total) by mouth daily with dinner  -     metoprolol succinate (TOPROL-XL) 25 mg 24 hr tablet; Take 1 tablet (25 mg total) by mouth daily  -     nitroglycerin (NITROSTAT) 0.4 mg SL tablet; Place 1 tablet (0.4 mg total) under the tongue every 5 (five) minutes as needed for chest pain    Hypercholesterolemia  -     Lipid Panel with Direct LDL reflex;  Future  -     ezetimibe (ZETIA) 10 mg tablet; Take 1 tablet (10 mg total) by mouth daily  -     atorvastatin (LIPITOR) 80 mg tablet; Take 1 tablet (80 mg total) by mouth daily with dinner    Chest pain, unspecified type  -     ezetimibe (ZETIA) 10 mg tablet; Take 1 tablet (10 mg total) by mouth daily  -     nitroglycerin (NITROSTAT) 0.4 mg SL tablet; Place 1 tablet (0.4 mg total) under the tongue every 5 (five) minutes as needed for chest pain    Dizziness  -     meclizine (ANTIVERT) 25 mg tablet; Take 1 tablet (25 mg total) by mouth 3 (three) times a day as needed for dizziness    NSTEMI (non-ST elevated myocardial infarction) ([facility])  -     aspirin (Aspirin Low Dose) 81 mg chewable tablet;  Chew 1 tablet (81 mg total) daily       ======================================================    Past Medical History:   Diagnosis Date   • Hyperlipidemia    • Hypertension    • MI (myocardial infarction) ([address])      Past Surgical History:   Procedure Laterality Date   • CORONARY ANGIOPLASTY WITH STENT PLACEMENT     • HAND SURGERY           Medications  Current Outpatient Medications   Medication Sig Dispense Refill   • aspirin (Aspirin Low Dose) 81 mg chewable tablet Chew 1 tablet (81 mg total) daily 90 tablet 3   • atorvastatin (LIPITOR) 80 mg tablet Take 1 tablet (80 mg total) by mouth daily with dinner 90 tablet 3   • Blood Pressure Monitoring KIT by Does not apply route 2 (two) times a day 1 kit 0   • Diclofenac Sodium (VOLTAREN) 1 % Apply 2 g topically 4 (four) times a day 100 g 5   • ezetimibe (ZETIA) 10 mg tablet Take 1 tablet (10 mg total) by mouth daily 90 tablet 3   • meclizine (ANTIVERT) 25 mg tablet Take 1 tablet (25 mg total) by mouth 3 (three) times a day as needed for dizziness 90 tablet 3   • methocarbamol (ROBAXIN) 500 mg tablet TAKE 1 TABLET (500 MG TOTAL) BY MOUTH 2 (TWO) TIMES A DAY AS NEEDED FOR MUSCLE SPASMS 20 tablet 0   • metoprolol succinate (TOPROL-XL) 25 mg 24 hr tablet Take 1 tablet (25 mg total) by mouth daily 90 tablet 3 • naproxen (NAPROSYN) 375 mg tablet Take 1 tablet (375 mg total) by mouth 2 (two) times a day with meals 20 tablet 0   • nitroglycerin (NITROSTAT) 0.4 mg SL tablet Place 1 tablet (0.4 mg total) under the tongue every 5 (five) minutes as needed for chest pain 25 tablet 3     No current facility-administered medications for this visit.         No Known Allergies    Social History     Socioeconomic History   • Marital status: /Civil Union     Spouse name: Not on file   • Number of children: Not on file   • Years of education: Not on file   • Highest education level: Not on file   Occupational History   • Not on file   Tobacco Use   • Smoking status: Every Day     Packs/day: 1.00     Types: Cigarettes   • Smokeless tobacco: Never   Vaping Use   • Vaping Use: Never used   Substance and Sexual Activity   • Alcohol use: Yes     Comment: occasional   • Drug use: Not Currently   • Sexual activity: Yes     Partners: Female   Other Topics Concern   • Not on file   Social History Narrative   • Not on file     Social Determinants of Health     Financial Resource Strain: Not on file   Food Insecurity: Not on file   Transportation Needs: Not on file   Physical Activity: Not on file   Stress: Not on file   Social Connections: Not on file   Intimate Partner Violence: Not on file   Housing Stability: Not on file        Family History   Problem Relation Age of Onset   • No Known Problems Mother    • No Known Problems Father        Lab Results   Component Value Date    WBC 7.00 02/08/2022    HGB 16.3 02/08/2022    HCT 50.8 (H) 02/08/2022    MCV 96 02/08/2022     02/08/2022      Lab Results   Component Value Date    SODIUM 138 02/08/2022    K 4.1 02/08/2022     (H) 02/08/2022    CO2 22 02/08/2022    BUN 15 02/08/2022    CREATININE 1.10 02/08/2022    GLUC 102 11/04/2021    CALCIUM 9.2 02/08/2022      Lab Results   Component Value Date    HGBA1C 5.7 (H) 02/08/2022      Lab Results   Component Value Date    CHOL 225 10/17/2014     Lab Results   Component Value Date    HDL 40 02/08/2022    HDL 36 (L) 09/10/2019    HDL 34 (L) 06/10/2019     Lab Results   Component Value Date    LDLCALC 99 02/08/2022    LDLCALC 87 09/10/2019    LDLCALC 158 (H) 06/10/2019     Lab Results   Component Value Date    TRIG 124 02/08/2022    TRIG 93 09/10/2019    TRIG 157 (H) 06/10/2019     No results found for: "CHOLHDL"   Lab Results   Component Value Date    INR 1.05 01/24/2021    INR 1.06 04/12/2020    INR 1.03 06/09/2019    PROTIME 13.5 01/24/2021    PROTIME 13.8 04/12/2020    PROTIME 13.0 06/09/2019          Patient Active Problem List    Diagnosis Date Noted   • Chronic bilateral thoracic back pain 02/08/2022   • Dizziness 02/08/2022   • Coronary artery disease involving native coronary artery of native heart without angina pectoris 02/08/2022   • Chronic pain syndrome 07/23/2021   • Thoracic disc herniation 07/23/2021   • Thoracic radiculopathy 07/23/2021   • Chest pain 01/24/2021   • Prediabetes 06/17/2019   • Hypercholesterolemia 06/11/2019   • History of non-ST elevation myocardial infarction (NSTEMI) 06/10/2019   • Tobacco abuse 06/10/2019   • Blurry vision, bilateral 10/10/2014   • Fatigue 10/10/2014   • Overweight (BMI 25.0-29.9) 10/10/2014       Portions of the record may have been created with voice recognition software. Occasional wrong word or "sound a like" substitutions may have occurred due to the inherent limitations of voice recognition software. Read the chart carefully and recognize, using context, where substitutions have occurred.     Yong Naik DO, [facility] [place]  8/3/2023 4:06 PM

## 2023-08-21 ENCOUNTER — APPOINTMENT (OUTPATIENT)
Dept: LAB | Facility: HOSPITAL | Age: 53
End: 2023-08-21
Payer: COMMERCIAL

## 2023-08-21 DIAGNOSIS — R07.9 CHEST PAIN, UNSPECIFIED TYPE: ICD-10-CM

## 2023-08-21 DIAGNOSIS — E78.00 HYPERCHOLESTEROLEMIA: ICD-10-CM

## 2023-08-21 DIAGNOSIS — I25.2 HISTORY OF NON-ST ELEVATION MYOCARDIAL INFARCTION (NSTEMI): ICD-10-CM

## 2023-08-21 LAB
CHOLEST SERPL-MCNC: 205 MG/DL
HDLC SERPL-MCNC: 39 MG/DL
LDLC SERPL CALC-MCNC: 132 MG/DL (ref 0–100)
TRIGL SERPL-MCNC: 171 MG/DL

## 2023-08-21 PROCEDURE — 80061 LIPID PANEL: CPT

## 2023-08-21 PROCEDURE — 36415 COLL VENOUS BLD VENIPUNCTURE: CPT

## 2023-08-22 ENCOUNTER — TELEPHONE (OUTPATIENT)
Dept: CARDIOLOGY CLINIC | Facility: CLINIC | Age: 53
End: 2023-08-22

## 2023-08-22 NOTE — TELEPHONE ENCOUNTER
PC to patient regarding his cholesterol level of 205. I gave him Dr Azeem Hoffman recommendations as to eating heart heathy diet. I went over good choices of foods and foods to avoid. Patient admits that he has been cheating and will try harder to improve diet.

## 2023-08-25 ENCOUNTER — VBI (OUTPATIENT)
Dept: ADMINISTRATIVE | Facility: OTHER | Age: 53
End: 2023-08-25

## 2023-11-08 ENCOUNTER — VBI (OUTPATIENT)
Dept: ADMINISTRATIVE | Facility: OTHER | Age: 53
End: 2023-11-08

## 2024-02-26 ENCOUNTER — OFFICE VISIT (OUTPATIENT)
Dept: FAMILY MEDICINE CLINIC | Facility: HOME HEALTHCARE | Age: 54
End: 2024-02-26
Payer: COMMERCIAL

## 2024-02-26 VITALS
WEIGHT: 215.2 LBS | TEMPERATURE: 98.1 F | HEART RATE: 88 BPM | OXYGEN SATURATION: 99 % | HEIGHT: 73 IN | DIASTOLIC BLOOD PRESSURE: 70 MMHG | BODY MASS INDEX: 28.52 KG/M2 | RESPIRATION RATE: 18 BRPM | SYSTOLIC BLOOD PRESSURE: 118 MMHG

## 2024-02-26 DIAGNOSIS — G89.29 CHRONIC BILATERAL THORACIC BACK PAIN: ICD-10-CM

## 2024-02-26 DIAGNOSIS — R07.9 CHEST PAIN, UNSPECIFIED TYPE: ICD-10-CM

## 2024-02-26 DIAGNOSIS — N52.9 ERECTILE DYSFUNCTION, UNSPECIFIED ERECTILE DYSFUNCTION TYPE: ICD-10-CM

## 2024-02-26 DIAGNOSIS — M54.6 CHRONIC BILATERAL THORACIC BACK PAIN: ICD-10-CM

## 2024-02-26 DIAGNOSIS — R42 DIZZINESS: Primary | ICD-10-CM

## 2024-02-26 PROCEDURE — 99213 OFFICE O/P EST LOW 20 MIN: CPT

## 2024-02-26 NOTE — PROGRESS NOTES
FAMILY MEDICINE OFFICE VISIT  [facility]      NAME: Harjeet Campos  AGE: 53 y.o. SEX: male    DATE OF ENCOUNTER: 2/26/2024    Assessment and Plan     1. Dizziness  -     Ambulatory Referral to Cardiology; Future  -     Blood Pressure Monitoring KIT; Use 2 (two) times a day  -     Hemoglobin A1C; Future  -     Comprehensive metabolic panel; Future  -     Lipid Panel with Direct LDL reflex; Future  -     CBC; Future    2. Chronic bilateral thoracic back pain  -     Diclofenac Sodium (VOLTAREN) 1 %; Apply 2 g topically 4 (four) times a day    3. Chest pain, unspecified type    4. Erectile dysfunction, unspecified erectile dysfunction type  Comments:  patient agreeed to discuss further durign next visit.  Orders:  -     Ambulatory Referral to Urology; Future  -     TSH, 3rd generation with Free T4 reflex; Future          Chief Complaint     Chief Complaint   Patient presents with    Dizziness       History of Present Illness     Harjeet Campos is 53 y.o. male presented to the office with complaints of Dizziness and Lightheadedness. Also reports feeling fatigued since 1-2 months. Associated with intermittent chest pain/discomfort. Reports Weight loss of 15 lbs over last 7 months. Reports Irregular food habits. He works in [place]. He will get 5 hr sleep. Snore loudly .  Continue to smoke. 1pack a day from last 40 yrs. Wife reports alcohol use, 2-3  beers everyday. 6 or more on weekends. Also reports sexual health problems.         The following portions of the patient's history were reviewed and updated as appropriate: allergies, current medications, past family history, past medical history, past social history, past surgical history and problem list.    Review of Systems     Review of Systems   Constitutional:  Negative for activity change.   HENT:  Negative for congestion and rhinorrhea.    Respiratory:  Negative for shortness of breath and wheezing.    Cardiovascular:  Negative for chest pain and  "palpitations.   Gastrointestinal:  Negative for abdominal pain, diarrhea and nausea.   Genitourinary:  Negative for difficulty urinating.   Skin:  Negative for rash.   Neurological:  Negative for light-headedness and headaches.   Psychiatric/Behavioral:  The patient is not nervous/anxious.    All other systems reviewed and are negative.      Active Problem List     Patient Active Problem List   Diagnosis    History of non-ST elevation myocardial infarction (NSTEMI)    Tobacco abuse    Hypercholesterolemia    Blurry vision, bilateral    Fatigue    Overweight (BMI 25.0-29.9)    Prediabetes    Chest pain    Chronic pain syndrome    Thoracic disc herniation    Thoracic radiculopathy    Chronic bilateral thoracic back pain    Dizziness    Coronary artery disease involving native coronary artery of native heart without angina pectoris       Objective     /70   Pulse 88   Temp 98.1 °F (36.7 °C)   Resp 18   Ht 6' 1\" (1.854 m)   Wt 97.6 kg (215 lb 3.2 oz)   SpO2 99%   BMI 28.39 kg/m²     Physical Exam  Vitals and nursing note reviewed. Exam conducted with a chaperone present.   Constitutional:       General: He is not in acute distress.     Appearance: Normal appearance.   HENT:      Head: Normocephalic and atraumatic.      Right Ear: External ear normal.      Left Ear: External ear normal.      Nose: No congestion or rhinorrhea.      Mouth/Throat:      Mouth: Mucous membranes are moist.      Pharynx: Oropharynx is clear.   Eyes:      Extraocular Movements: Extraocular movements intact.      Conjunctiva/sclera: Conjunctivae normal.   Cardiovascular:      Rate and Rhythm: Normal rate and regular rhythm.      Pulses: Normal pulses.      Heart sounds: Normal heart sounds. No murmur heard.  Pulmonary:      Effort: Pulmonary effort is normal.      Breath sounds: Normal breath sounds. No wheezing.   Abdominal:      General: Bowel sounds are normal.      Palpations: Abdomen is soft.      Tenderness: There is no " abdominal tenderness.   Musculoskeletal:      Cervical back: Neck supple.      Right lower leg: No edema.      Left lower leg: No edema.   Skin:     General: Skin is warm and dry.      Capillary Refill: Capillary refill takes less than 2 seconds.   Neurological:      General: No focal deficit present.      Mental Status: He is alert and oriented to person, place, and time.   Psychiatric:         Behavior: Behavior normal.         Current Medications     Current Outpatient Medications:     aspirin (Aspirin Low Dose) 81 mg chewable tablet, Chew 1 tablet (81 mg total) daily, Disp: 90 tablet, Rfl: 3    atorvastatin (LIPITOR) 80 mg tablet, Take 1 tablet (80 mg total) by mouth daily with dinner, Disp: 90 tablet, Rfl: 3    Blood Pressure Monitoring KIT, Use 2 (two) times a day, Disp: 1 kit, Rfl: 0    Diclofenac Sodium (VOLTAREN) 1 %, Apply 2 g topically 4 (four) times a day, Disp: 100 g, Rfl: 5    ezetimibe (ZETIA) 10 mg tablet, Take 1 tablet (10 mg total) by mouth daily, Disp: 90 tablet, Rfl: 3    methocarbamol (ROBAXIN) 500 mg tablet, TAKE 1 TABLET (500 MG TOTAL) BY MOUTH 2 (TWO) TIMES A DAY AS NEEDED FOR MUSCLE SPASMS, Disp: 20 tablet, Rfl: 0    metoprolol succinate (TOPROL-XL) 25 mg 24 hr tablet, Take 1 tablet (25 mg total) by mouth daily, Disp: 90 tablet, Rfl: 3    nitroglycerin (NITROSTAT) 0.4 mg SL tablet, Place 1 tablet (0.4 mg total) under the tongue every 5 (five) minutes as needed for chest pain, Disp: 25 tablet, Rfl: 3    meclizine (ANTIVERT) 25 mg tablet, Take 1 tablet (25 mg total) by mouth 3 (three) times a day as needed for dizziness (Patient not taking: Reported on 2/26/2024), Disp: 90 tablet, Rfl: 3    naproxen (NAPROSYN) 375 mg tablet, Take 1 tablet (375 mg total) by mouth 2 (two) times a day with meals (Patient not taking: Reported on 2/26/2024), Disp: 20 tablet, Rfl: 0    Health Maintenance     Health Maintenance   Topic Date Due    Pneumococcal Vaccine: Pediatrics (0 to 5 Years) and At-Risk Patients  (6 to 64 Years) (1 of 2 - PCV) Never done    Annual Physical  Never done    Colorectal Cancer Screening  Never done    Zoster Vaccine (1 of 2) Never done    Depression Screening  03/08/2022    DTaP,Tdap,and Td Vaccines (2 - Td or Tdap) 04/15/2022    BMI: Followup Plan  02/08/2023    Influenza Vaccine (1) 09/01/2023    COVID-19 Vaccine (3 - 2023-24 season) 09/01/2023    BMI: Adult  02/26/2025    HIV Screening  Completed    Hepatitis C Screening  Completed    HIB Vaccine  Aged Out    IPV Vaccine  Aged Out    Hepatitis A Vaccine  Aged Out    Meningococcal ACWY Vaccine  Aged Out    HPV Vaccine  Aged Out     Immunization History   Administered Date(s) Administered    COVID-19 MODERNA VACC 0.5 ML IM 05/04/2021, 06/03/2021    Influenza Quadrivalent Preservative Free 3 years and older IM 10/10/2014    Influenza, recombinant, quadrivalent,injectable, preservative free 03/08/2021, 02/08/2022    Influenza, seasonal, injectable, preservative free 10/17/2019    Tdap 04/15/2012           Tahira Lares MD   Family Medicine  PGY- 3  3/8/2024 12:22 PM

## 2024-03-03 ENCOUNTER — APPOINTMENT (OUTPATIENT)
Dept: LAB | Facility: HOSPITAL | Age: 54
End: 2024-03-03
Payer: COMMERCIAL

## 2024-03-03 DIAGNOSIS — N52.9 ERECTILE DYSFUNCTION, UNSPECIFIED ERECTILE DYSFUNCTION TYPE: ICD-10-CM

## 2024-03-03 DIAGNOSIS — R42 DIZZINESS: ICD-10-CM

## 2024-03-03 LAB
ALBUMIN SERPL BCP-MCNC: 4.3 G/DL (ref 3.5–5)
ALP SERPL-CCNC: 107 U/L (ref 34–104)
ALT SERPL W P-5'-P-CCNC: 19 U/L (ref 7–52)
ANION GAP SERPL CALCULATED.3IONS-SCNC: 7 MMOL/L
AST SERPL W P-5'-P-CCNC: 15 U/L (ref 13–39)
BILIRUB SERPL-MCNC: 0.54 MG/DL (ref 0.2–1)
BUN SERPL-MCNC: 17 MG/DL (ref 5–25)
CALCIUM SERPL-MCNC: 9.5 MG/DL (ref 8.4–10.2)
CHLORIDE SERPL-SCNC: 108 MMOL/L (ref 96–108)
CHOLEST SERPL-MCNC: 230 MG/DL
CO2 SERPL-SCNC: 25 MMOL/L (ref 21–32)
CREAT SERPL-MCNC: 1.05 MG/DL (ref 0.6–1.3)
ERYTHROCYTE [DISTWIDTH] IN BLOOD BY AUTOMATED COUNT: 13.6 % (ref 11.6–15.1)
EST. AVERAGE GLUCOSE BLD GHB EST-MCNC: 120 MG/DL
GFR SERPL CREATININE-BSD FRML MDRD: 80 ML/MIN/1.73SQ M
GLUCOSE P FAST SERPL-MCNC: 99 MG/DL (ref 65–99)
HBA1C MFR BLD: 5.8 %
HCT VFR BLD AUTO: 50.9 % (ref 36.5–49.3)
HDLC SERPL-MCNC: 50 MG/DL
HGB BLD-MCNC: 16.2 G/DL (ref 12–17)
LDLC SERPL CALC-MCNC: 159 MG/DL (ref 0–100)
MCH RBC QN AUTO: 30.8 PG (ref 26.8–34.3)
MCHC RBC AUTO-ENTMCNC: 31.8 G/DL (ref 31.4–37.4)
MCV RBC AUTO: 97 FL (ref 82–98)
PLATELET # BLD AUTO: 204 THOUSANDS/UL (ref 149–390)
PMV BLD AUTO: 10.1 FL (ref 8.9–12.7)
POTASSIUM SERPL-SCNC: 4.2 MMOL/L (ref 3.5–5.3)
PROT SERPL-MCNC: 7 G/DL (ref 6.4–8.4)
RBC # BLD AUTO: 5.26 MILLION/UL (ref 3.88–5.62)
SODIUM SERPL-SCNC: 140 MMOL/L (ref 135–147)
TRIGL SERPL-MCNC: 106 MG/DL
TSH SERPL DL<=0.05 MIU/L-ACNC: 2.25 UIU/ML (ref 0.45–4.5)
WBC # BLD AUTO: 8.08 THOUSAND/UL (ref 4.31–10.16)

## 2024-03-03 PROCEDURE — 83036 HEMOGLOBIN GLYCOSYLATED A1C: CPT

## 2024-03-03 PROCEDURE — 85027 COMPLETE CBC AUTOMATED: CPT

## 2024-03-03 PROCEDURE — 80061 LIPID PANEL: CPT

## 2024-03-03 PROCEDURE — 36415 COLL VENOUS BLD VENIPUNCTURE: CPT

## 2024-03-03 PROCEDURE — 84443 ASSAY THYROID STIM HORMONE: CPT

## 2024-03-03 PROCEDURE — 80053 COMPREHEN METABOLIC PANEL: CPT

## 2024-03-08 ENCOUNTER — OFFICE VISIT (OUTPATIENT)
Dept: FAMILY MEDICINE CLINIC | Facility: HOME HEALTHCARE | Age: 54
End: 2024-03-08
Payer: COMMERCIAL

## 2024-03-08 VITALS
HEART RATE: 80 BPM | TEMPERATURE: 99.4 F | RESPIRATION RATE: 18 BRPM | HEIGHT: 73 IN | SYSTOLIC BLOOD PRESSURE: 120 MMHG | WEIGHT: 217.6 LBS | BODY MASS INDEX: 28.84 KG/M2 | OXYGEN SATURATION: 96 % | DIASTOLIC BLOOD PRESSURE: 82 MMHG

## 2024-03-08 DIAGNOSIS — E78.00 HYPERCHOLESTEROLEMIA: ICD-10-CM

## 2024-03-08 DIAGNOSIS — R42 INTERMITTENT LIGHTHEADEDNESS: Primary | ICD-10-CM

## 2024-03-08 DIAGNOSIS — R73.03 PREDIABETES: ICD-10-CM

## 2024-03-08 DIAGNOSIS — Z91.148 NON COMPLIANCE W MEDICATION REGIMEN: ICD-10-CM

## 2024-03-08 DIAGNOSIS — I25.2 HISTORY OF NON-ST ELEVATION MYOCARDIAL INFARCTION (NSTEMI): ICD-10-CM

## 2024-03-08 DIAGNOSIS — I25.10 CORONARY ARTERY DISEASE INVOLVING NATIVE CORONARY ARTERY OF NATIVE HEART WITHOUT ANGINA PECTORIS: ICD-10-CM

## 2024-03-08 PROCEDURE — 99214 OFFICE O/P EST MOD 30 MIN: CPT

## 2024-03-08 RX ORDER — EZETIMIBE 10 MG/1
10 TABLET ORAL DAILY
Qty: 90 TABLET | Refills: 3 | Status: SHIPPED | OUTPATIENT
Start: 2024-03-08

## 2024-03-08 NOTE — PROGRESS NOTES
FAMILY MEDICINE OFFICE VISIT  [facility]      NAME: Harjeet Campos  AGE: 53 y.o. SEX: male    DATE OF ENCOUNTER: 3/8/2024    Assessment and Plan     1. Intermittent lightheadedness  -     ECG 12 lead; Future  -     Ambulatory Referral to Cardiology; Future  -     Holter monitor; Future; Expected date: 03/08/2024    2. Prediabetes  -     Ambulatory Referral to Diabetic Education; Future    3. History of non-ST elevation myocardial infarction (NSTEMI)  -     Ambulatory Referral to Cardiology; Future  -     ezetimibe (ZETIA) 10 mg tablet; Take 1 tablet (10 mg total) by mouth daily    4. Coronary artery disease involving native coronary artery of native heart without angina pectoris  -     Ambulatory Referral to Cardiology; Future    5. Hypercholesterolemia  Comments:  recommend start taking mediation  follow up lipid panel in 6 months  Orders:  -     ezetimibe (ZETIA) 10 mg tablet; Take 1 tablet (10 mg total) by mouth daily    6. Non compliance w medication regimen  Comments:  not taking lipitor and Zetia    7. Hypercholesterolemia  -     ezetimibe (ZETIA) 10 mg tablet; Take 1 tablet (10 mg total) by mouth daily        Harjeet Campos is 53 y.o. male presented to the office with intermittent lightheadedness. In office orthostatic vitals negative. Vitals stable. Advised Holter monitoring for further evaluation.   Reviewed labs with patient. Discussed regarding importance of medication compliance regarding statin therapy. Referral placed to Cardiology to reestablish care with cardiology with significant cardiac history.  Previous imaging reviewed with patient.    Patient is clinically and hemodynamically significant during the visit.       Chief Complaint     Chief Complaint   Patient presents with    Results       History of Present Illness     Patient is 52 yo M with h/o CAD, presented to the office for follow up visit for intermittent lightheadedness. Reports that he is changing his lifestyle,  "started exercising regularly. Reports improvement in lightheadedness. He continue to have intermittent episodes of lightheadedness. Also reports that he is having intermittent chest pain on left side. Involving anterolateral chest and upper back on left side. Denies association with lightheadedness. Denies nausea, SOB, headache, abdominal pain, change in urination and bowel moment during the episode.             The following portions of the patient's history were reviewed and updated as appropriate: allergies, current medications, past family history, past medical history, past social history, past surgical history and problem list.    Review of Systems     Review of Systems   Constitutional:  Negative for activity change.   HENT:  Negative for congestion and rhinorrhea.    Respiratory:  Negative for shortness of breath and wheezing.    Cardiovascular:  Negative for chest pain and palpitations.   Gastrointestinal:  Negative for abdominal pain, diarrhea and nausea.   Genitourinary:  Negative for difficulty urinating.   Skin:  Negative for rash.   Neurological:  Negative for light-headedness and headaches.   Psychiatric/Behavioral:  The patient is not nervous/anxious.    All other systems reviewed and are negative.      Active Problem List     Patient Active Problem List   Diagnosis    History of non-ST elevation myocardial infarction (NSTEMI)    Tobacco abuse    Hypercholesterolemia    Blurry vision, bilateral    Fatigue    Overweight (BMI 25.0-29.9)    Prediabetes    Chest pain    Chronic pain syndrome    Thoracic disc herniation    Thoracic radiculopathy    Chronic bilateral thoracic back pain    Dizziness    Coronary artery disease involving native coronary artery of native heart without angina pectoris       Objective     /82 (BP Location: Left arm, Patient Position: Standing, Cuff Size: Large)   Pulse 80   Temp 99.4 °F (37.4 °C)   Resp 18   Ht 6' 1\" (1.854 m)   Wt 98.7 kg (217 lb 9.6 oz)   SpO2 96%  "  BMI 28.71 kg/m²     Physical Exam  Vitals and nursing note reviewed. Exam conducted with a chaperone present.   Constitutional:       General: He is not in acute distress.     Appearance: Normal appearance.   HENT:      Head: Normocephalic and atraumatic.      Right Ear: External ear normal.      Left Ear: External ear normal.      Nose: No congestion or rhinorrhea.      Mouth/Throat:      Mouth: Mucous membranes are moist.      Pharynx: Oropharynx is clear.   Eyes:      Extraocular Movements: Extraocular movements intact.      Conjunctiva/sclera: Conjunctivae normal.   Cardiovascular:      Rate and Rhythm: Normal rate and regular rhythm.      Pulses: Normal pulses.      Heart sounds: Normal heart sounds. No murmur heard.  Pulmonary:      Effort: Pulmonary effort is normal.      Breath sounds: Normal breath sounds. No wheezing.   Abdominal:      General: Bowel sounds are normal.      Palpations: Abdomen is soft.      Tenderness: There is no abdominal tenderness.   Musculoskeletal:      Cervical back: Neck supple.      Right lower leg: No edema.      Left lower leg: No edema.   Skin:     General: Skin is warm and dry.      Capillary Refill: Capillary refill takes less than 2 seconds.   Neurological:      General: No focal deficit present.      Mental Status: He is alert and oriented to person, place, and time.   Psychiatric:         Behavior: Behavior normal.         Pertinent Laboratory/Diagnostic Studies:  CBC:   Lab Results   Component Value Date/Time    WBC 8.08 03/03/2024 10:50 AM    WBC 8.33 10/17/2014 09:02 AM    RBC 5.26 03/03/2024 10:50 AM    RBC 5.03 10/17/2014 09:02 AM    HGB 16.2 03/03/2024 10:50 AM    HGB 16.0 10/17/2014 09:02 AM    HCT 50.9 (H) 03/03/2024 10:50 AM    HCT 48.1 10/17/2014 09:02 AM    MCV 97 03/03/2024 10:50 AM    MCV 96 10/17/2014 09:02 AM    MCH 30.8 03/03/2024 10:50 AM    MCH 31.8 10/17/2014 09:02 AM    MCHC 31.8 03/03/2024 10:50 AM    MCHC 33.3 10/17/2014 09:02 AM    RDW 13.6  03/03/2024 10:50 AM    RDW 13.7 10/17/2014 09:02 AM    MPV 10.1 03/03/2024 10:50 AM    MPV 11.2 10/17/2014 09:02 AM     03/03/2024 10:50 AM     10/17/2014 09:02 AM    NRBC 0 02/08/2022 10:21 AM    NEUTOPHILPCT 58 02/08/2022 10:21 AM    NEUTOPHILPCT 56 10/17/2014 09:02 AM    LYMPHOPCT 35 02/08/2022 10:21 AM    LYMPHOPCT 36 10/17/2014 09:02 AM    MONOPCT 5 02/08/2022 10:21 AM    MONOPCT 6 10/17/2014 09:02 AM    EOSPCT 1 02/08/2022 10:21 AM    EOSPCT 1 10/17/2014 09:02 AM    BASOPCT 1 02/08/2022 10:21 AM    BASOPCT 1 10/17/2014 09:02 AM    NEUTROABS 4.10 02/08/2022 10:21 AM    NEUTROABS 4.66 10/17/2014 09:02 AM    LYMPHSABS 2.44 02/08/2022 10:21 AM    LYMPHSABS 3.00 10/17/2014 09:02 AM    MONOSABS 0.33 02/08/2022 10:21 AM    MONOSABS 0.50 10/17/2014 09:02 AM    EOSABS 0.07 02/08/2022 10:21 AM    EOSABS 0.08 10/17/2014 09:02 AM     Chemistry Profile:   Lab Results   Component Value Date/Time     10/17/2014 09:02 AM    K 4.2 03/03/2024 10:50 AM    K 4.6 10/17/2014 09:02 AM     03/03/2024 10:50 AM     10/17/2014 09:02 AM    CO2 25 03/03/2024 10:50 AM    CO2 26.6 10/17/2014 09:02 AM    ANIONGAP 4 10/17/2014 09:02 AM    BUN 17 03/03/2024 10:50 AM    BUN 13 10/17/2014 09:02 AM    CREATININE 1.05 03/03/2024 10:50 AM    CREATININE 1.04 10/17/2014 09:02 AM    GLUC 102 11/04/2021 11:47 AM    GLUF 99 03/03/2024 10:50 AM    GLUCOSE 108 10/17/2014 09:02 AM    CALCIUM 9.5 03/03/2024 10:50 AM    CALCIUM 8.7 10/17/2014 09:02 AM    MG 2.3 01/24/2021 05:42 PM    AST 15 03/03/2024 10:50 AM    AST 22 10/17/2014 09:02 AM    ALT 19 03/03/2024 10:50 AM    ALT 38 10/17/2014 09:02 AM    ALKPHOS 107 (H) 03/03/2024 10:50 AM    ALKPHOS 128 10/17/2014 09:02 AM    PROT 7.3 10/17/2014 09:02 AM    BILITOT 0.5 10/17/2014 09:02 AM    EGFR 80 03/03/2024 10:50 AM     Endocrine Studies:   Lab Results   Component Value Date/Time    HGBA1C 5.8 (H) 03/03/2024 10:50 AM    [id number] 2.254 03/03/2024 10:50 AM    BPR0PQYEYCSN  1.714 10/17/2014 09:02 AM    TRIG 106 03/03/2024 10:50 AM    TRIG 161 10/17/2014 09:02 AM    CHOL 225 10/17/2014 09:02 AM    CHOLESTEROL 230 (H) 03/03/2024 10:50 AM    HDL 50 03/03/2024 10:50 AM    HDL 31 10/17/2014 09:02 AM    LDLCALC 159 (H) 03/03/2024 10:50 AM    LDLCALC 162 (H) 10/17/2014 09:02 AM    NONHDLC 189 06/10/2019 05:06 AM       MRI thoracic spine wo contrast 06/2021  FINDINGS:     ALIGNMENT: There is preserved normal thoracic kyphosis.  No subluxation.  Vertebral heights are maintained.     MARROW SIGNAL:  No abnormal bone marrow signal or suspicious discrete marrow lesion.     THORACIC CORD: Normal signal within the thoracic cord.     PARAVERTEBRAL SOFT TISSUES:  Normal.      THORACIC DEGENERATIVE CHANGE:     There is left paracentral disc protrusion at levels T5-6 and T8-9 resulting in left eccentric mild canal narrowing.     Left paracentral disc protrusion at level T12-L1 along with ligamentum flavum thickening resulting in left eccentric mild canal stenosis.     Left eccentric mild canal narrowing at level L1-2 secondary to left paracentral disc protrusion.     There is mild disc bulge at T6-7 and T3-T4 mildly indenting ventral thecal sac.     Disc bulge and ligamentum flavum thickening.  Suggested moderate canal stenosis at levels C5-6 and C6-7 only imaged in sagittal T2 sequence for counting (series 3)     IMPRESSION:     1.  Multilevel primarily left-sided mild canal stenosis as detailed.     2.  Suggested moderate degenerative canal stenosis at levels C5-6 and C6-7.  These levels are only imaged in sagittal T2 sequence for counting and not included in axial images.  May consider dedicated cervical spine MRI for better assessment.           Workstation performed: [id number]    Current Medications     Current Outpatient Medications:     aspirin (Aspirin Low Dose) 81 mg chewable tablet, Chew 1 tablet (81 mg total) daily, Disp: 90 tablet, Rfl: 3    atorvastatin (LIPITOR) 80 mg tablet, Take 1  tablet (80 mg total) by mouth daily with dinner, Disp: 90 tablet, Rfl: 3    Blood Pressure Monitoring KIT, Use 2 (two) times a day, Disp: 1 kit, Rfl: 0    Diclofenac Sodium (VOLTAREN) 1 %, Apply 2 g topically 4 (four) times a day, Disp: 100 g, Rfl: 5    ezetimibe (ZETIA) 10 mg tablet, Take 1 tablet (10 mg total) by mouth daily, Disp: 90 tablet, Rfl: 3    metoprolol succinate (TOPROL-XL) 25 mg 24 hr tablet, Take 1 tablet (25 mg total) by mouth daily, Disp: 90 tablet, Rfl: 3    nitroglycerin (NITROSTAT) 0.4 mg SL tablet, Place 1 tablet (0.4 mg total) under the tongue every 5 (five) minutes as needed for chest pain, Disp: 25 tablet, Rfl: 3    meclizine (ANTIVERT) 25 mg tablet, Take 1 tablet (25 mg total) by mouth 3 (three) times a day as needed for dizziness (Patient not taking: Reported on 2/26/2024), Disp: 90 tablet, Rfl: 3    Health Maintenance     Health Maintenance   Topic Date Due    Pneumococcal Vaccine: Pediatrics (0 to 5 Years) and At-Risk Patients (6 to 64 Years) (1 of 2 - PCV) Never done    Annual Physical  Never done    Colorectal Cancer Screening  Never done    Zoster Vaccine (1 of 2) Never done    DTaP,Tdap,and Td Vaccines (2 - Td or Tdap) 04/15/2022    BMI: Followup Plan  02/08/2023    Influenza Vaccine (1) 09/01/2023    COVID-19 Vaccine (3 - 2023-24 season) 09/01/2023    Depression Screening  03/08/2025    BMI: Adult  03/08/2025    HIV Screening  Completed    Hepatitis C Screening  Completed    HIB Vaccine  Aged Out    IPV Vaccine  Aged Out    Hepatitis A Vaccine  Aged Out    Meningococcal ACWY Vaccine  Aged Out    HPV Vaccine  Aged Out     Immunization History   Administered Date(s) Administered    COVID-19 MODERNA VACC 0.5 ML IM 05/04/2021, 06/03/2021    Influenza Quadrivalent Preservative Free 3 years and older IM 10/10/2014    Influenza, recombinant, quadrivalent,injectable, preservative free 03/08/2021, 02/08/2022    Influenza, seasonal, injectable, preservative free 10/17/2019    Tdap 04/15/2012            Tahira Lares MD   Family Medicine  PGY- 3  3/8/2024 3:52 PM

## 2024-04-03 ENCOUNTER — TELEPHONE (OUTPATIENT)
Dept: DIABETES SERVICES | Facility: OTHER | Age: 54
End: 2024-04-03

## 2024-04-03 NOTE — TELEPHONE ENCOUNTER
Diabetes education referral received for prediabetes.  Discussed education but patient needs to check with health insurance to see if counseling is covered for prediabetes.  He is currently on vacation and will contact them once he returns home.

## 2024-04-24 ENCOUNTER — APPOINTMENT (EMERGENCY)
Dept: CT IMAGING | Facility: HOSPITAL | Age: 54
End: 2024-04-24
Payer: COMMERCIAL

## 2024-04-24 ENCOUNTER — HOSPITAL ENCOUNTER (EMERGENCY)
Facility: HOSPITAL | Age: 54
Discharge: HOME | End: 2024-04-24
Attending: EMERGENCY MEDICINE
Payer: COMMERCIAL

## 2024-04-24 VITALS
OXYGEN SATURATION: 95 % | TEMPERATURE: 98.1 F | DIASTOLIC BLOOD PRESSURE: 67 MMHG | HEART RATE: 72 BPM | RESPIRATION RATE: 21 BRPM | SYSTOLIC BLOOD PRESSURE: 123 MMHG

## 2024-04-24 DIAGNOSIS — R10.9 ABDOMINAL PAIN: Primary | ICD-10-CM

## 2024-04-24 DIAGNOSIS — R11.2 NAUSEA AND VOMITING: ICD-10-CM

## 2024-04-24 LAB
ALBUMIN SERPL BCP-MCNC: 4.1 G/DL (ref 3.5–5)
ALP SERPL-CCNC: 120 U/L (ref 34–104)
ALT SERPL W P-5'-P-CCNC: 23 U/L (ref 7–52)
ANION GAP SERPL CALCULATED.3IONS-SCNC: 9 MMOL/L (ref 4–13)
AST SERPL W P-5'-P-CCNC: 26 U/L (ref 13–39)
BASOPHILS # BLD AUTO: 0.04 THOUSANDS/ÂΜL (ref 0–0.1)
BASOPHILS NFR BLD AUTO: 0 % (ref 0–1)
BILIRUB SERPL-MCNC: 0.38 MG/DL (ref 0.2–1)
BILIRUB UR QL STRIP: NEGATIVE
BUN SERPL-MCNC: 24 MG/DL (ref 5–25)
CALCIUM SERPL-MCNC: 9.5 MG/DL (ref 8.4–10.2)
CHLORIDE SERPL-SCNC: 105 MMOL/L (ref 96–108)
CLARITY UR: CLEAR
CO2 SERPL-SCNC: 22 MMOL/L (ref 21–32)
COLOR UR: YELLOW
CREAT SERPL-MCNC: 1.2 MG/DL (ref 0.6–1.3)
EOSINOPHIL # BLD AUTO: 0.07 THOUSAND/ÂΜL (ref 0–0.61)
EOSINOPHIL NFR BLD AUTO: 1 % (ref 0–6)
ERYTHROCYTE [DISTWIDTH] IN BLOOD BY AUTOMATED COUNT: 13.4 % (ref 11.6–15.1)
GFR SERPL CREATININE-BSD FRML MDRD: 68 ML/MIN/1.73SQ M
GLUCOSE SERPL-MCNC: 104 MG/DL (ref 65–140)
GLUCOSE UR STRIP-MCNC: NEGATIVE MG/DL
HCT VFR BLD AUTO: 49.8 % (ref 36.5–49.3)
HGB BLD-MCNC: 16.2 G/DL (ref 12–17)
HGB UR QL STRIP.AUTO: NEGATIVE
IMM GRANULOCYTES # BLD AUTO: 0.02 THOUSAND/UL (ref 0–0.2)
IMM GRANULOCYTES NFR BLD AUTO: 0 % (ref 0–2)
KETONES UR STRIP-MCNC: NEGATIVE MG/DL
LEUKOCYTE ESTERASE UR QL STRIP: NEGATIVE
LIPASE SERPL-CCNC: 45 U/L (ref 11–82)
LYMPHOCYTES # BLD AUTO: 2.3 THOUSANDS/ÂΜL (ref 0.6–4.47)
LYMPHOCYTES NFR BLD AUTO: 26 % (ref 14–44)
MCH RBC QN AUTO: 31.6 PG (ref 26.8–34.3)
MCHC RBC AUTO-ENTMCNC: 32.5 G/DL (ref 31.4–37.4)
MCV RBC AUTO: 97 FL (ref 82–98)
MONOCYTES # BLD AUTO: 0.72 THOUSAND/ÂΜL (ref 0.17–1.22)
MONOCYTES NFR BLD AUTO: 8 % (ref 4–12)
NEUTROPHILS # BLD AUTO: 5.8 THOUSANDS/ÂΜL (ref 1.85–7.62)
NEUTS SEG NFR BLD AUTO: 65 % (ref 43–75)
NITRITE UR QL STRIP: NEGATIVE
NRBC BLD AUTO-RTO: 0 /100 WBCS
PH UR STRIP.AUTO: 6 [PH]
PLATELET # BLD AUTO: 185 THOUSANDS/UL (ref 149–390)
PMV BLD AUTO: 10 FL (ref 8.9–12.7)
POTASSIUM SERPL-SCNC: 4.5 MMOL/L (ref 3.5–5.3)
PROT SERPL-MCNC: 7.1 G/DL (ref 6.4–8.4)
PROT UR STRIP-MCNC: NEGATIVE MG/DL
RBC # BLD AUTO: 5.12 MILLION/UL (ref 3.88–5.62)
SODIUM SERPL-SCNC: 136 MMOL/L (ref 135–147)
SP GR UR STRIP.AUTO: >=1.03 (ref 1–1.03)
UROBILINOGEN UR STRIP-ACNC: <2 MG/DL
WBC # BLD AUTO: 8.95 THOUSAND/UL (ref 4.31–10.16)

## 2024-04-24 PROCEDURE — 74177 CT ABD & PELVIS W/CONTRAST: CPT

## 2024-04-24 PROCEDURE — 80053 COMPREHEN METABOLIC PANEL: CPT | Performed by: EMERGENCY MEDICINE

## 2024-04-24 PROCEDURE — 36415 COLL VENOUS BLD VENIPUNCTURE: CPT | Performed by: EMERGENCY MEDICINE

## 2024-04-24 PROCEDURE — 93005 ELECTROCARDIOGRAM TRACING: CPT

## 2024-04-24 PROCEDURE — 85025 COMPLETE CBC W/AUTO DIFF WBC: CPT | Performed by: EMERGENCY MEDICINE

## 2024-04-24 PROCEDURE — 96361 HYDRATE IV INFUSION ADD-ON: CPT

## 2024-04-24 PROCEDURE — 81003 URINALYSIS AUTO W/O SCOPE: CPT | Performed by: EMERGENCY MEDICINE

## 2024-04-24 PROCEDURE — 96374 THER/PROPH/DIAG INJ IV PUSH: CPT

## 2024-04-24 PROCEDURE — 99284 EMERGENCY DEPT VISIT MOD MDM: CPT

## 2024-04-24 PROCEDURE — 83690 ASSAY OF LIPASE: CPT | Performed by: EMERGENCY MEDICINE

## 2024-04-24 PROCEDURE — 96375 TX/PRO/DX INJ NEW DRUG ADDON: CPT

## 2024-04-24 PROCEDURE — 99285 EMERGENCY DEPT VISIT HI MDM: CPT | Performed by: EMERGENCY MEDICINE

## 2024-04-24 RX ORDER — MAGNESIUM HYDROXIDE/ALUMINUM HYDROXICE/SIMETHICONE 120; 1200; 1200 MG/30ML; MG/30ML; MG/30ML
30 SUSPENSION ORAL ONCE
Status: COMPLETED | OUTPATIENT
Start: 2024-04-24 | End: 2024-04-24

## 2024-04-24 RX ORDER — ONDANSETRON 2 MG/ML
4 INJECTION INTRAMUSCULAR; INTRAVENOUS ONCE
Status: COMPLETED | OUTPATIENT
Start: 2024-04-24 | End: 2024-04-24

## 2024-04-24 RX ORDER — FAMOTIDINE 10 MG/ML
20 INJECTION, SOLUTION INTRAVENOUS ONCE
Status: COMPLETED | OUTPATIENT
Start: 2024-04-24 | End: 2024-04-24

## 2024-04-24 RX ORDER — OMEPRAZOLE 20 MG/1
20 CAPSULE, DELAYED RELEASE ORAL DAILY
Qty: 30 CAPSULE | Refills: 0 | Status: SHIPPED | OUTPATIENT
Start: 2024-04-24

## 2024-04-24 RX ADMIN — IOHEXOL 100 ML: 350 INJECTION, SOLUTION INTRAVENOUS at 18:46

## 2024-04-24 RX ADMIN — SODIUM CHLORIDE 1000 ML: 0.9 INJECTION, SOLUTION INTRAVENOUS at 17:59

## 2024-04-24 RX ADMIN — ONDANSETRON 4 MG: 2 INJECTION INTRAMUSCULAR; INTRAVENOUS at 17:59

## 2024-04-24 RX ADMIN — FAMOTIDINE 20 MG: 10 INJECTION, SOLUTION INTRAVENOUS at 17:59

## 2024-04-24 RX ADMIN — ALUMINUM HYDROXIDE, MAGNESIUM HYDROXIDE, AND DIMETHICONE 30 ML: 200; 20; 200 SUSPENSION ORAL at 17:58

## 2024-04-24 NOTE — ED PROVIDER NOTES
History  Chief Complaint   Patient presents with    Abdominal Pain     Patient complains of generalized abdominal pain, nausea, diarrhea and weakness for the last few days. Denies any fevers. Pain 4/10 at this time     HPI    53-year-old male with history of NSTEMI, hypertension and hyperlipidemia who presents for evaluation of abdominal pain.  Patient states he has been having symptoms for the past 3 months.  He states he has had intermittent epigastric abdominal pain.  He has also been having nonbloody diarrhea.  He has had nausea vomiting.  He states he has lost approximately 20 pounds over the past 3 months.  Denies chest pain or shortness of breath.  Denies cough.  Denies fevers or chills.  Denies urinary symptoms.  Denies black or bloody bowel movements.  Patient states he feels generally weak and lightheaded.  He has had decreased appetite.  He states he does smoke about a pack and a half of cigarettes a day.  He also states he drinks 2 beers daily.    Prior to Admission Medications   Prescriptions Last Dose Informant Patient Reported? Taking?   Blood Pressure Monitoring KIT   No No   Sig: Use 2 (two) times a day   Diclofenac Sodium (VOLTAREN) 1 %   No No   Sig: Apply 2 g topically 4 (four) times a day   aspirin (Aspirin Low Dose) 81 mg chewable tablet   No No   Sig: Chew 1 tablet (81 mg total) daily   atorvastatin (LIPITOR) 80 mg tablet   No No   Sig: Take 1 tablet (80 mg total) by mouth daily with dinner   ezetimibe (ZETIA) 10 mg tablet   No No   Sig: Take 1 tablet (10 mg total) by mouth daily   meclizine (ANTIVERT) 25 mg tablet   No No   Sig: Take 1 tablet (25 mg total) by mouth 3 (three) times a day as needed for dizziness   Patient not taking: Reported on 2/26/2024   metoprolol succinate (TOPROL-XL) 25 mg 24 hr tablet   No No   Sig: Take 1 tablet (25 mg total) by mouth daily   nitroglycerin (NITROSTAT) 0.4 mg SL tablet   No No   Sig: Place 1 tablet (0.4 mg total) under the tongue every 5 (five) minutes  as needed for chest pain      Facility-Administered Medications: None       Past Medical History:   Diagnosis Date    Hyperlipidemia     Hypertension     MI (myocardial infarction) (HCC)        Past Surgical History:   Procedure Laterality Date    CORONARY ANGIOPLASTY WITH STENT PLACEMENT      HAND SURGERY         Family History   Problem Relation Age of Onset    No Known Problems Mother     No Known Problems Father      I have reviewed and agree with the history as documented.    E-Cigarette/Vaping    E-Cigarette Use Never User      E-Cigarette/Vaping Substances    Nicotine No     THC No     CBD No     Flavoring No     Other No     Unknown No      Social History     Tobacco Use    Smoking status: Every Day     Current packs/day: 1.00     Types: Cigarettes    Smokeless tobacco: Never   Vaping Use    Vaping status: Never Used   Substance Use Topics    Alcohol use: Yes     Comment: occasional    Drug use: Not Currently       Review of Systems   Constitutional:  Positive for appetite change, fatigue and unexpected weight change. Negative for chills and fever.   HENT:  Negative for congestion, rhinorrhea and sore throat.    Respiratory:  Negative for cough and shortness of breath.    Cardiovascular:  Negative for chest pain.   Gastrointestinal:  Positive for abdominal pain, diarrhea, nausea and vomiting. Negative for blood in stool.   Genitourinary:  Negative for dysuria, frequency, hematuria and urgency.   Musculoskeletal:  Negative for arthralgias and myalgias.   Skin:  Negative for rash.   Neurological:  Positive for light-headedness. Negative for dizziness, weakness, numbness and headaches.   All other systems reviewed and are negative.      Physical Exam  Physical Exam  Vitals and nursing note reviewed.   Constitutional:       General: He is not in acute distress.     Appearance: Normal appearance. He is well-developed and normal weight. He is not ill-appearing, toxic-appearing or diaphoretic.   HENT:      Head:  Normocephalic and atraumatic.      Right Ear: External ear normal.      Left Ear: External ear normal.      Nose: Nose normal.      Mouth/Throat:      Mouth: Mucous membranes are moist.      Pharynx: Oropharynx is clear.   Eyes:      Extraocular Movements: Extraocular movements intact.      Conjunctiva/sclera: Conjunctivae normal.   Cardiovascular:      Rate and Rhythm: Normal rate and regular rhythm.      Pulses: Normal pulses.      Heart sounds: Normal heart sounds. No murmur heard.     No friction rub. No gallop.   Pulmonary:      Effort: Pulmonary effort is normal. No respiratory distress.      Breath sounds: Normal breath sounds. No wheezing or rales.   Abdominal:      General: There is no distension.      Palpations: Abdomen is soft.      Tenderness: There is generalized abdominal tenderness and tenderness in the epigastric area. There is no guarding or rebound.   Musculoskeletal:         General: No tenderness.      Cervical back: Neck supple.   Skin:     General: Skin is warm and dry.      Coloration: Skin is not pale.      Findings: No rash.   Neurological:      General: No focal deficit present.      Mental Status: He is alert and oriented to person, place, and time.      Cranial Nerves: No cranial nerve deficit.      Sensory: No sensory deficit.      Motor: No weakness.   Psychiatric:         Mood and Affect: Mood normal.         Behavior: Behavior normal.         Vital Signs  ED Triage Vitals   Temperature Pulse Respirations Blood Pressure SpO2   04/24/24 1733 04/24/24 1731 04/24/24 1731 04/24/24 1731 04/24/24 1731   98.1 °F (36.7 °C) 89 18 134/77 97 %      Temp Source Heart Rate Source Patient Position - Orthostatic VS BP Location FiO2 (%)   04/24/24 1733 04/24/24 1731 04/24/24 1915 04/24/24 1915 --   Temporal Monitor Lying Right arm       Pain Score       04/24/24 1731       4           Vitals:    04/24/24 1731 04/24/24 1915   BP: 134/77 123/67   Pulse: 89 72   Patient Position - Orthostatic VS:  Lying          Visual Acuity      ED Medications  Medications   Famotidine (PF) (PEPCID) injection 20 mg (20 mg Intravenous Given 4/24/24 1759)   ondansetron (ZOFRAN) injection 4 mg (4 mg Intravenous Given 4/24/24 1759)   sodium chloride 0.9 % bolus 1,000 mL (0 mL Intravenous Stopped 4/24/24 1953)   aluminum-magnesium hydroxide-simethicone (MAALOX) oral suspension 30 mL (30 mL Oral Given 4/24/24 1758)   iohexol (OMNIPAQUE) 350 MG/ML injection (MULTI-DOSE) 100 mL (100 mL Intravenous Given 4/24/24 1846)       Diagnostic Studies  Results Reviewed       Procedure Component Value Units Date/Time    Comprehensive metabolic panel [[id number]]  (Abnormal) Collected: 04/24/24 1801    Lab Status: Final result Specimen: Blood from Arm, Left Updated: 04/24/24 1832     Sodium 136 mmol/L      Potassium 4.5 mmol/L      Chloride 105 mmol/L      CO2 22 mmol/L      ANION GAP 9 mmol/L      BUN 24 mg/dL      Creatinine 1.20 mg/dL      Glucose 104 mg/dL      Calcium 9.5 mg/dL      AST 26 U/L      ALT 23 U/L      Alkaline Phosphatase 120 U/L      Total Protein 7.1 g/dL      Albumin 4.1 g/dL      Total Bilirubin 0.38 mg/dL      eGFR 68 ml/min/1.73sq m     Narrative:      [employer] Kid[employer] guidelines for Chronic Kidney Disease (CKD):     Stage 1 with normal or high GFR (GFR > 90 mL/min/1.73 square meters)    Stage 2 Mild CKD (GFR = 60-89 mL/min/1.73 square meters)    Stage 3A Moderate CKD (GFR = 45-59 mL/min/1.73 square meters)    Stage 3B Moderate CKD (GFR = 30-44 mL/min/1.73 square meters)    Stage 4 Severe CKD (GFR = 15-29 mL/min/1.73 square meters)    Stage 5 End Stage CKD (GFR <15 mL/min/1.73 square meters)  Note: GFR calculation is accurate only with a steady state creatinine    Lipase [[id number]]  (Normal) Collected: 04/24/24 1801    Lab Status: Final result Specimen: Blood from Arm, Left Updated: 04/24/24 1832     Lipase 45 u/L     UA w Reflex to Microscopic w Reflex to Culture [[id number]] Collected: 04/24/24 1801    Lab  Status: Final result Specimen: Urine, Clean Catch Updated: 04/24/24 1813     Color, UA Yellow     Clarity, UA Clear     Specific Gravity, UA >=1.030     pH, UA 6.0     Leukocytes, UA Negative     Nitrite, UA Negative     Protein, UA Negative mg/dl      Glucose, UA Negative mg/dl      Ketones, UA Negative mg/dl      Urobilinogen, UA <2.0 mg/dl      Bilirubin, UA Negative     Occult Blood, UA Negative    CBC and differential [[id number]]  (Abnormal) Collected: 04/24/24 1801    Lab Status: Final result Specimen: Blood from Arm, Left Updated: 04/24/24 1812     WBC 8.95 Thousand/uL      RBC 5.12 Million/uL      Hemoglobin 16.2 g/dL      Hematocrit 49.8 %      MCV 97 fL      MCH 31.6 pg      MCHC 32.5 g/dL      RDW 13.4 %      MPV 10.0 fL      Platelets 185 Thousands/uL      nRBC 0 /100 WBCs      Segmented % 65 %      Immature Grans % 0 %      Lymphocytes % 26 %      Monocytes % 8 %      Eosinophils Relative 1 %      Basophils Relative 0 %      Absolute Neutrophils 5.80 Thousands/µL      Absolute Immature Grans 0.02 Thousand/uL      Absolute Lymphocytes 2.30 Thousands/µL      Absolute Monocytes 0.72 Thousand/µL      Eosinophils Absolute 0.07 Thousand/µL      Basophils Absolute 0.04 Thousands/µL                    CT abdomen pelvis with contrast   Final Result by Dawn Bell MD (04/24 1912)      No acute intra-abdominal pathology.      Uncomplicated colonic diverticulosis.      Mild prostatomegaly indenting the urinary bladder base.         Workstation performed: [id number]                    Procedures  Procedures         ED Course                               SBIRT 20yo+      Flowsheet Row Most Recent Value   Initial Alcohol Screen: US AUDIT-C     1. How often do you have a drink containing alcohol? 0 Filed at: 04/24/2024 1732   2. How many drinks containing alcohol do you have on a typical day you are drinking?  0 Filed at: 04/24/2024 1732   3a. Male UNDER 65: How often do you have five or more drinks on one occasion?  0 Filed at: 04/24/2024 1732   3b. FEMALE Any Age, or MALE 65+: How often do you have 4 or more drinks on one occassion? 0 Filed at: 04/24/2024 1732   Audit-C Score 0 Filed at: 04/24/2024 1732   ADRYAN: How many times in the past year have you...    Used an illegal drug or used a prescription medication for non-medical reasons? Never Filed at: 04/24/2024 1732                      Medical Decision Making  Amount and/or Complexity of Data Reviewed  Labs: ordered.  Radiology: ordered.    Risk  OTC drugs.  Prescription drug management.      53-year-old male with history of NSTEMI, hypertension and hyperlipidemia who presents for evaluation of abdominal pain.    Patient has been having intermittent abdominal pain along with nausea, vomiting, and diarrhea for the past 3 months.  He has had associated weight loss and decreased appetite.  Differential diagnosis includes: Malignancy, peptic ulcer disease, pancreatitis.  Will check CMP to evaluate for electrolyte abnormality or metabolic abnormality, CBC to evaluate for anemia, lipase to evaluate for pancreatitis, UA to evaluate for hematuria.  Will obtain CT abdomen pelvis to evaluate for malignancy or other intra-abdominal pathology.  Will treat symptomatically and reassess.  Reviewed labs, no marked abnormalities.  CT abdomen pelvis negative for acute pathology, does show a slightly enlarged prostate patient does not have any symptoms related to this.  Will have patient follow-up with his primary care doctor regarding prostate enlargement.  Will also provide referral for GI for suspected gastritis versus peptic ulcer disease.  Will start patient on omeprazole daily.  Discussed with patient strict return precautions.  Patient expressed understanding and was agreeable for discharge.       Disposition  Final diagnoses:   Abdominal pain   Nausea and vomiting     Time reflects when diagnosis was documented in both MDM as applicable and the Disposition within this note       Time  User Action Codes Description Comment    4/24/2024  7:42 PM Dayna Flower Add [R10.9] Abdominal pain     4/24/2024  7:43 PM Dayna Flower Add [R11.2] Nausea and vomiting           ED Disposition       ED Disposition   Discharge    Condition   Stable    Date/Time   Wed Apr 24, 2024 1942    Comment   Harjeet Campos discharge to home/self care.                   Follow-up Information       Follow up With Specialties Details Why Contact Info Additional Information    Francesco Reyes PA-C Family Medicine   [address] [place] [id number]  [phone]       [facility] Schedule an appointment as soon as possible for a visit   [address] [place] [address] [phone] [facility] [place], [address] [place] [address], [phone]            Discharge Medication List as of 4/24/2024  7:45 PM        START taking these medications    Details   omeprazole (PriLOSEC) 20 mg delayed release capsule Take 1 capsule (20 mg total) by mouth daily, Starting Wed 4/24/2024, Normal           CONTINUE these medications which have NOT CHANGED    Details   aspirin (Aspirin Low Dose) 81 mg chewable tablet Chew 1 tablet (81 mg total) daily, Starting Thu 8/3/2023, Normal      atorvastatin (LIPITOR) 80 mg tablet Take 1 tablet (80 mg total) by mouth daily with dinner, Starting Thu 8/3/2023, Normal      Blood Pressure Monitoring KIT Use 2 (two) times a day, Starting Mon 2/26/2024, Normal      Diclofenac Sodium (VOLTAREN) 1 % Apply 2 g topically 4 (four) times a day, Starting Mon 2/26/2024, Normal      ezetimibe (ZETIA) 10 mg tablet Take 1 tablet (10 mg total) by mouth daily, Starting Fri 3/8/2024, Normal      meclizine (ANTIVERT) 25 mg tablet Take 1 tablet (25 mg total) by mouth 3 (three) times a day as needed for dizziness, Starting Thu 8/3/2023, Normal      metoprolol succinate (TOPROL-XL) 25 mg 24 hr  tablet Take 1 tablet (25 mg total) by mouth daily, Starting Thu 8/3/2023, Normal      nitroglycerin (NITROSTAT) 0.4 mg SL tablet Place 1 tablet (0.4 mg total) under the tongue every 5 (five) minutes as needed for chest pain, Starting Thu 8/3/2023, Normal             No discharge procedures on file.    PDMP Review       None            ED Provider  Electronically Signed by             Dayna Flower MD  04/24/24 0708

## 2024-04-26 LAB
ATRIAL RATE: 89 BPM
P AXIS: 53 DEGREES
PR INTERVAL: 150 MS
QRS AXIS: 17 DEGREES
QRSD INTERVAL: 90 MS
QT INTERVAL: 378 MS
QTC INTERVAL: 459 MS
T WAVE AXIS: 17 DEGREES
VENTRICULAR RATE: 89 BPM

## 2024-04-26 PROCEDURE — 93010 ELECTROCARDIOGRAM REPORT: CPT | Performed by: INTERNAL MEDICINE

## 2024-06-04 ENCOUNTER — OFFICE VISIT (OUTPATIENT)
Dept: CARDIOLOGY CLINIC | Facility: CLINIC | Age: 54
End: 2024-06-04
Payer: COMMERCIAL

## 2024-06-04 VITALS
SYSTOLIC BLOOD PRESSURE: 124 MMHG | BODY MASS INDEX: 27.13 KG/M2 | HEART RATE: 83 BPM | DIASTOLIC BLOOD PRESSURE: 70 MMHG | WEIGHT: 205.6 LBS

## 2024-06-04 DIAGNOSIS — E78.00 HYPERCHOLESTEROLEMIA: ICD-10-CM

## 2024-06-04 DIAGNOSIS — M54.14 THORACIC RADICULOPATHY: ICD-10-CM

## 2024-06-04 DIAGNOSIS — Z72.0 TOBACCO ABUSE: ICD-10-CM

## 2024-06-04 DIAGNOSIS — I25.10 CORONARY ARTERY DISEASE INVOLVING NATIVE CORONARY ARTERY OF NATIVE HEART WITHOUT ANGINA PECTORIS: Primary | ICD-10-CM

## 2024-06-04 PROCEDURE — 99406 BEHAV CHNG SMOKING 3-10 MIN: CPT

## 2024-06-04 PROCEDURE — 99214 OFFICE O/P EST MOD 30 MIN: CPT

## 2024-06-04 NOTE — PROGRESS NOTES
Cardiology   MD Rell No MD, FACC  Jarod Green DO, [facility] FACP Ather Mansoor, MD Rujul Patel, DO, [facility]  Anuj Patino DO, [facility]  -------------------------------------------------------------------  [facility]  [address] [place] [address]  Phone: [phone]  Fax: [phone]  06/04/24  Harjeet Campos  YOB: 1970   MRN: [id number]      Referring Physician: No referring provider defined for this encounter.     HPI: Harjeet Campos is a 53 y.o. male with:   Coronary artery disease status post PCI to the [facility] obtuse marginal in June 2019  Dyslipidemia  Tobacco abuse  Mild valvular heart disease seen on echo in 2019    He presents today for follow-up.  He states that from a heart standpoint he is doing okay, denies any specific angina type symptoms.  Has been under a lot of stress recently and he has been smoking even more.  He states he is smoking about 2 packs/day currently.  States that he has not been eating as much due to his stress and has lost weight but despite this his cholesterol remains very uncontrolled.  Blood pressure is controlled however    Review of Systems   Constitutional:  Negative for chills and fever.   HENT:  Negative for facial swelling and sore throat.    Eyes:  Negative for visual disturbance.   Respiratory:  Negative for cough, chest tightness, shortness of breath and wheezing.    Cardiovascular:  Negative for chest pain, palpitations and leg swelling.   Gastrointestinal:  Negative for abdominal pain, blood in stool, constipation, diarrhea, nausea and vomiting.   Endocrine: Negative for cold intolerance and heat intolerance.   Genitourinary:  Negative for decreased urine volume, difficulty urinating, dysuria and hematuria.   Musculoskeletal:  Negative for arthralgias, back pain and myalgias.   Skin:  Negative for rash.   Neurological:  Negative for dizziness, syncope, weakness and numbness.    Psychiatric/Behavioral:  Negative for agitation, behavioral problems and confusion. The patient is not nervous/anxious.         OBJECTIVE  Vitals:    06/04/24 1626   BP: 124/70   Pulse: 83       Physical Exam  Constitutional: awake, alert and oriented, in no acute distress, no obvious deformities  Head: Normocephalic, without obvious abnormality, atraumatic  Eyes: conjunctivae clear and moist. Sclera anicteric.  No xanthelasmas. Pupils equal bilaterally.  Extraocular motions are full.  Ear nose mouth and throat: ears are symmetrical bilaterally, hearing appears to be equal bilaterally, no nasal discharge or epistaxis, oropharynx is clear with moist mucous membranes  Neck:  Trachea is midline, neck is supple, no thyromegaly or significant lymphadenopathy, there is full range of motion.  Lungs: clear to auscultation bilaterally, no wheezes, no rales, no rhonchi, no accessory muscle use, breathing is nonlabored  Heart: Regular rhythm with a Normal heart rate, S1, S2 normal, No Murmur, no click, rub or gallop, No lower extremity edema  Abdomen: soft, non-tender; bowel sounds normal; no masses,  no organomegaly  Psychiatric:  Patient is oriented to time, place, person, mood/affect is negative for depression, anxiety, agitation, appears to have appropriate insight  Skin: Skin is warm, dry, intact. No obvious rashes or lesions on exposed extremities.  Nail beds are pink with no cyanosis or clubbing.    EKG:  No results found for this visit on 06/04/24.     IMPRESSION:  Coronary artery disease status post PCI to the [facility] obtuse marginal in June 2019  Dyslipidemia  Tobacco abuse  Mild valvular heart disease seen on echo in 2019    DISCUSSION/RECOMMENDATIONS:  He denies any angina symptoms  Blood pressure is controlled  Cholesterol very uncontrolled however.  At last visit I added Zetia to his regimen in addition to his atorvastatin 80 mg but despite this his LDL cholesterol still 159.  Given his dyslipidemia and history of  coronary disease, his goal should be at least less than 70 and ideally less than 55.  Will look into adding Leqvio today  Will hold off on repeat cardiac imaging for now  If Leqvanessa approved, would plan to repeat lipid panel 6 months    Anuj Patino DO, [facility]  --------------------------------------------------------------------------------  TREADMILL STRESS  No results found for this or any previous visit.     ----------------------------------------------------------------------------------------------  NUCLEAR STRESS TEST: Results for orders placed during the hospital encounter of 12/14/22    NM myocardial perfusion spect (stress and/or rest)    Interpretation Summary  •  Stress ECG: No ST deviation is noted. The ECG was negative for ischemia. The stress ECG is negative for ischemia after submaximal exercise, without reproduction of symptoms of chest pain.  •  Perfusion: There is a left ventricular perfusion defect that is medium in size with mild reduction in uptake present in the basal to mid inferior location(s) that is fixed with normal myocardial thickening. The defect appears to be an artifact caused by diaphragmatic activity.  •  Stress Function: Left ventricular function post-stress is normal. Post-stress ejection fraction is 58 %.  •  Stress Combined Conclusion: The ECG and SPECT imaging portions of the stress study are concordant with no evidence of stress induced myocardial ischemia. There is image artifact, without diagnostic evidence for perfusion abnormality.    No results found for this or any previous visit.      --------------------------------------------------------------------------------  CATH:  Results for orders placed during the hospital encounter of 06/10/19    Cardiac catheterization    Narrative  [address] [place] [address] [phone]    (Blankline)    Invasive Cardiovascular Lab Complete Report    Patient: RENETTA  FAVIOLA  MR number: [id number]  Account number: [id number]  Study date: 06/10/2019  Gender: Male  : 1970  Height: 72.8 in  Weight: 222.2 lb  BSA: 2.25 mï¾²    Allergies: NO KNOWN ALLERGIES    Diagnostic Cardiologist:  Bj Guajardo DO  Interventional Cardiologist:  Bj Guajardo DO  Primary Physician:  ELEANOR Ashraf    SUMMARY    CORONARY CIRCULATION:  There was 1-vessel coronary artery disease.  Left main: Normal.  1st obtuse marginal: There was a 90 % stenosis. Remainder circumflex nonobstructive    HEMODYNAMICS:  Hemodynamic assessment demonstrated mildly elevated LVEDP.    1ST LESION INTERVENTIONS:  A drug-eluting stent with balloon angioplasty procedure was performed on the 90 % lesion in the 1st obtuse marginal. Following intervention there was an excellent angiographic appearance with a 0 % residual stenosis.  A Xience Alba Rx 3.25 x 15mm drug-eluting stent was placed across the lesion and deployed at a maximum inflation pressure of 14 rich.    INDICATIONS:  --  Possible CAD: myocardial infarction without ST elevation (NSTEMI).    PROCEDURES PERFORMED    --  Left heart catheterization without ventriculogram.  --  Right coronary angiography.  --  Left coronary angiography.  --  Inpatient.  --  Mod Sedation Same Physician Initial 15min.  --  Mod Sedation Same Physician Add 15min.  --  Coronary Catheterization (w/ C).  --  Coronary Drug Eluting Stent w/PTCA.  --  Intervention on OM1: drug-eluting stent, balloon angioplasty.    PROCEDURE: The risks and alternatives of the procedures and conscious sedation were explained to the patient and informed consent was obtained. The patient was brought to the cath lab and placed on the table. The planned puncture sites  were prepped and draped in the usual sterile fashion.    --  Right radial artery access. After performing an Juliano's test to verify adequate ulnar artery supply to the hand, the radial site was prepped. The puncture site  "was infiltrated with local anesthetic. The vessel was accessed using the  modified Seldinger technique, a wire was advanced into the vessel, and a sheath was advanced over the wire into the vessel.    --  Left heart catheterization without ventriculogram. A catheter was advanced over a guidewire into the ascending aorta. After recording ascending aortic pressure, the catheter was advanced across the aortic valve and left ventricular  pressure was recorded. The catheter was pulled back across the aortic valve and into the ascending aorta and pullback pressures were obtained.    --  Right coronary artery angiography. A catheter was advanced over a guidewire into the aorta and positioned in the right coronary artery ostium under fluoroscopic guidance. Angiography was performed.    --  Left coronary artery angiography. A catheter was advanced over a guidewire into the aorta and positioned in the left coronary artery ostium under fluoroscopic guidance. Angiography was performed.    --  Inpatient.    --  Mod Sedation Same Physician Initial 15min.    --  Mod Sedation Same Physician Add 15min.    --  Coronary Catheterization (w/ LHC).    LESION INTERVENTION: A drug-eluting stent with balloon angioplasty procedure was performed on the 90 % lesion in the 1st obtuse marginal. Following intervention there was an excellent angiographic appearance with a 0 % residual stenosis.  This was an ACC/AHA type B \"moderate risk\" lesion for intervention. There was SUSAN 3 flow before the procedure and SUSAN 3 flow after the procedure. There was no dissection.    --  Vessel setup was performed. A Launcher 6Fr Ebu 3.5 guiding catheter was used to cannulate the vessel.    --  Vessel setup was performed. A BMW .014 190cm wire was used to cross the lesion.    --  A Xience Alba Rx 3.25 x 15mm drug-eluting stent was placed across the lesion and deployed at a maximum inflation pressure of 14 rich.    --  Balloon angioplasty was performed, using a " NC Trek Rx 3.5 x 15mm balloon, with 1 inflations and a maximum inflation pressure of 12 rich.    INTERVENTIONS:  --  Coronary Drug Eluting Stent w/PTCA.    PROCEDURE COMPLETION: The patient tolerated the procedure well and was discharged from the cath lab. TIMING: Test started at 10:11. Test concluded at 10:38. HEMOSTASIS: The sheath was removed. The site was compressed with a Hemoband  device. Hemostasis was obtained. MEDICATIONS GIVEN: Baby Aspirin, 324 mg, PO, at 10:12. Fentanyl (1OOmcg/2 ml), 50 mcg, IV, at 10:19. Versed (2mg/2ml), 2 mg, IV, at 10:19. 1% Lidocaine, 1 ml, subcutaneously, at 10:19. Verapamil (5mg/2ml),  2.5 mg, IV, at 10:19. Heparin 1000 units/ml, 4,000 units, IV, at 10:19. Nitroglycerin (200mcg/ml), 200 mcg, at 10:19. Heparin 1000 units/ml, 6,000 units, IV, at 10:27. CONTRAST GIVEN: 75 ml Omnipaque (350mg I /ml). RADIATION EXPOSURE:  Fluoroscopy time: 3.7 min.    HEMODYNAMICS: Hemodynamic assessment demonstrated mildly elevated LVEDP.    CORONARY VESSELS:   --  The coronary circulation is right dominant.  --  There was 1-vessel coronary artery disease.  --  Left main: Normal.  --  LAD: Angiography showed minor luminal irregularities.  --  1st obtuse marginal: There was a 90 % stenosis. Remainder circumflex nonobstructive  --  RCA: Angiography showed minor luminal irregularities.    IMPRESSIONS:  PCI of OM with RORY    RECOMMENDATIONS  asa 81 indefinitely, plavix x 1 year    DISPOSITION:  The patient left the catheterization laboratory in stable condition.    Prepared and signed by    Bj Guajardo DO  Signed 06/10/2019 10:41:34    Study diagram    Angiographic findings  Native coronary lesions:  ï¾·OM1: Lesion 1: 90 % stenosis.  Intervention results  Native coronary lesions:  ï¾·drug-eluting stent and balloon angioplasty of the 90 % stenosis in OM1. Appearance excellent with 0 % residual stenosis. Stent: Xience Alba Rx 3.25 x 15mm drug-eluting.    Hemodynamic tables    Pressures:   Baseline  Pressures:  - HR: 70  Pressures:  - Rhythm:  Pressures:  -- Aortic Pressure (S/D/M): 119/35/95  Pressures:  -- Left Ventricle (s/edp): 106/19/--    Outputs:  Baseline  Outputs:  -- CALCULATIONS[age]  Outputs:  -- CALCULATIONS: Body Surface Area: 2.25  Outputs:  -- CALCULATIONS: Height in cm: 185.00  Outputs:  -- CALCULATIONS: Sex: Male  Outputs:  -- CALCULATIONS: Weight in k.00    --------------------------------------------------------------------------------  ECHO:   Results for orders placed during the hospital encounter of 06/10/19    Echo complete with contrast if indicated    [place] [address]  [phone]    Transthoracic Echocardiogram  2D, M-mode, Doppler, and Color Doppler    Study date:  2019    Patient: RENETTA SALMERON  MR number: [id number]  Account number: [id number]  : 1970  Age: 48 years  Gender: Male  Status: Inpatient  Location: Bedside  Height: 73 in  Weight: 223 lb  BP: 116/ 75 mmHg    Indications: NSTEMI    Diagnoses: I21.4 - Non-ST elevation (NSTEMI) myocardial infarction    Sonographer:  Mirian GALLEGO  Primary Physician:  ELEANOR Ashraf  Referring Physician:  Dae White MD  Group:  [facility]  Interpreting Physician:  Junior Clarke MD    SUMMARY    LEFT VENTRICLE:  Normal left ventricular systolic function, EF 58%. Normal left ventricular cavity size. Normal left ventricular wall thickness. Normal left ventricular wall motion without regional wall motion abnormalities. Normal left ventricular  diastolic function. Normal left atrial pressures and left ventricular filling pressures.    LEFT ATRIUM:  Mild left atrial enlargement.    MITRAL VALVE:  There was trace regurgitation.    TRICUSPID VALVE:  There was mild regurgitation.    HISTORY: PRIOR HISTORY: NSTEMI, tobacco use.    PROCEDURE: The procedure was performed at the bedside. This was a  routine study. The transthoracic approach was used. The study included complete 2D imaging, M-mode, complete spectral Doppler, and color Doppler. The heart rate was 54 bpm,  at the start of the study. Images were obtained from the parasternal, apical, subcostal, and suprasternal notch acoustic windows. Echocardiographic views were limited due to high windows and lung interference. Image quality was adequate.    LEFT VENTRICLE: Normal left ventricular systolic function, EF 58%. Normal left ventricular cavity size. Normal left ventricular wall thickness. Normal left ventricular wall motion without regional wall motion abnormalities. Normal left  ventricular diastolic function. Normal left atrial pressures and left ventricular filling pressures.    RIGHT VENTRICLE: The size was normal. Systolic function was normal. Wall thickness was normal.    LEFT ATRIUM: Mild left atrial enlargement.    RIGHT ATRIUM: Size was normal.    MITRAL VALVE: Valve structure was normal. There was normal leaflet separation. DOPPLER: The transmitral velocity was within the normal range. There was no evidence for stenosis. There was trace regurgitation.    AORTIC VALVE: The valve was trileaflet. Leaflets exhibited normal thickness and normal cuspal separation. DOPPLER: Transaortic velocity was within the normal range. There was no evidence for stenosis. There was no regurgitation.    TRICUSPID VALVE: The valve structure was normal. There was normal leaflet separation. DOPPLER: The transtricuspid velocity was within the normal range. There was no evidence for stenosis. There was mild regurgitation.    PULMONIC VALVE: DOPPLER: The transpulmonic velocity was within the normal range. There was no regurgitation.    PERICARDIUM: There was no pericardial effusion. The pericardium was normal in appearance.    AORTA: The root exhibited normal size.    PULMONARY ARTERY: The size was normal. DOPPLER: Systolic pressure was within the normal  range.    SYSTEM MEASUREMENT TABLES    2D  %FS: 37.3 %  Ao Diam: 2.9 cm  EDV(Teich): 137.8 ml  EF(Teich): 66.8 %  ESV(Teich): 45.7 ml  IVSd: 0.9 cm  LA Area: 21.1 cm2  LA Diam: 4.1 cm  LVEDV MOD A4C: 135.2 ml  LVEF MOD A4C: 57.9 %  LVESV MOD A4C: 56.9 ml  LVIDd: 5.3 cm  LVIDs: 3.3 cm  LVLd A4C: 8.8 cm  LVLs A4C: 7.5 cm  LVPWd: 0.9 cm  RA Area: 14.9 cm2  RVIDd: 2.9 cm  SV MOD A4C: 78.3 ml  SV(Teich): 92.1 ml    CW  AV Vmax: 1.4 m/s  AV maxP.9 mmHg  TR Vmax: 2.2 m/s  TR maxP.9 mmHg    MM  TAPSE: 2 cm    PW  E': 0.1 m/s  E/E': 10.5  MV A Eldon: 0.5 m/s  MV Dec Stewart: 5.1 m/s2  MV DecT: 157.6 ms  MV E Eldon: 0.8 m/s  MV E/A Ratio: 1.5  MV PHT: 45.7 ms  MVA By PHT: 4.8 cm2    [facility] Accredited Echocardiography Laboratory    Prepared and electronically signed by    Junior Clarke MD  Signed 2019 16:36:31    No results found for this or any previous visit.    --------------------------------------------------------------------------------  HOLTER  No results found for this or any previous visit.    No results found for this or any previous visit.    --------------------------------------------------------------------------------  CAROTIDS  No results found for this or any previous visit.     --------------------------------------------------------------------------------  Diagnoses and all orders for this visit:    Coronary artery disease involving native coronary artery of native heart without angina pectoris  -     Injection procedure prior authorization; Future  -     Inclisiran Sodium (LEQVIO) subcutaneous injection 284 mg    Thoracic radiculopathy    Hypercholesterolemia  -     Injection procedure prior authorization; Future  -     Inclisiran Sodium (LEQVIO) subcutaneous injection 284 mg    Tobacco abuse       ======================================================    Past Medical History:   Diagnosis Date   • Hyperlipidemia    • Hypertension    • MI (myocardial infarction) (HCC)       Past Surgical History:   Procedure Laterality Date   • CORONARY ANGIOPLASTY WITH STENT PLACEMENT     • HAND SURGERY           Medications  Current Outpatient Medications   Medication Sig Dispense Refill   • aspirin (Aspirin Low Dose) 81 mg chewable tablet Chew 1 tablet (81 mg total) daily 90 tablet 3   • atorvastatin (LIPITOR) 80 mg tablet Take 1 tablet (80 mg total) by mouth daily with dinner 90 tablet 3   • Diclofenac Sodium (VOLTAREN) 1 % Apply 2 g topically 4 (four) times a day 100 g 5   • ezetimibe (ZETIA) 10 mg tablet Take 1 tablet (10 mg total) by mouth daily 90 tablet 3   • metoprolol succinate (TOPROL-XL) 25 mg 24 hr tablet Take 1 tablet (25 mg total) by mouth daily 90 tablet 3   • nitroglycerin (NITROSTAT) 0.4 mg SL tablet Place 1 tablet (0.4 mg total) under the tongue every 5 (five) minutes as needed for chest pain 25 tablet 3   • omeprazole (PriLOSEC) 20 mg delayed release capsule Take 1 capsule (20 mg total) by mouth daily 30 capsule 0   • Blood Pressure Monitoring KIT Use 2 (two) times a day (Patient not taking: Reported on 6/4/2024) 1 kit 0   • meclizine (ANTIVERT) 25 mg tablet Take 1 tablet (25 mg total) by mouth 3 (three) times a day as needed for dizziness (Patient not taking: Reported on 2/26/2024) 90 tablet 3     Current Facility-Administered Medications   Medication Dose Route Frequency Provider Last Rate Last Admin   • [START ON 6/19/2024] Inclisiran Sodium (LEQVIO) subcutaneous injection 284 mg  284 mg Subcutaneous Once             No Known Allergies    Social History     Socioeconomic History   • Marital status: /Civil Union     Spouse name: Not on file   • Number of children: Not on file   • Years of education: Not on file   • Highest education level: Not on file   Occupational History   • Not on file   Tobacco Use   • Smoking status: Every Day     Current packs/day: 1.00     Types: Cigarettes   • Smokeless tobacco: Never   Vaping Use   • Vaping status: Never Used   Substance and  "Sexual Activity   • Alcohol use: Yes     Comment: occasional   • Drug use: Not Currently   • Sexual activity: Yes     Partners: Female   Other Topics Concern   • Not on file   Social History Narrative   • Not on file     Social Determinants of Health     Financial Resource Strain: Not on file   Food Insecurity: Not on file   Transportation Needs: Not on file   Physical Activity: Not on file   Stress: Not on file   Social Connections: Not on file   Intimate Partner Violence: Not on file   Housing Stability: Not on file        Family History   Problem Relation Age of Onset   • No Known Problems Mother    • No Known Problems Father        Lab Results   Component Value Date    WBC 8.95 04/24/2024    HGB 16.2 04/24/2024    HCT 49.8 (H) 04/24/2024    MCV 97 04/24/2024     04/24/2024      Lab Results   Component Value Date    SODIUM 136 04/24/2024    K 4.5 04/24/2024     04/24/2024    CO2 22 04/24/2024    BUN 24 04/24/2024    CREATININE 1.20 04/24/2024    GLUC 104 04/24/2024    CALCIUM 9.5 04/24/2024      Lab Results   Component Value Date    HGBA1C 5.8 (H) 03/03/2024      Lab Results   Component Value Date    CHOL 225 10/17/2014     Lab Results   Component Value Date    HDL 50 03/03/2024    HDL 39 (L) 08/21/2023    HDL 40 02/08/2022     Lab Results   Component Value Date    LDLCALC 159 (H) 03/03/2024    LDLCALC 132 (H) 08/21/2023    LDLCALC 99 02/08/2022     Lab Results   Component Value Date    TRIG 106 03/03/2024    TRIG 171 (H) 08/21/2023    TRIG 124 02/08/2022     No results found for: \"CHOLHDL\"   Lab Results   Component Value Date    INR 1.05 01/24/2021    INR 1.06 04/12/2020    INR 1.03 06/09/2019    PROTIME 13.5 01/24/2021    PROTIME 13.8 04/12/2020    PROTIME 13.0 06/09/2019          Patient Active Problem List    Diagnosis Date Noted   • Chronic bilateral thoracic back pain 02/08/2022   • Dizziness 02/08/2022   • Coronary artery disease involving native coronary artery of native heart without angina " "pectoris 02/08/2022   • Chronic pain syndrome 07/23/2021   • Thoracic disc herniation 07/23/2021   • Thoracic radiculopathy 07/23/2021   • Chest pain 01/24/2021   • Prediabetes 06/17/2019   • Hypercholesterolemia 06/11/2019   • History of non-ST elevation myocardial infarction (NSTEMI) 06/10/2019   • Tobacco abuse 06/10/2019   • Blurry vision, bilateral 10/10/2014   • Fatigue 10/10/2014   • Overweight (BMI 25.0-29.9) 10/10/2014       Portions of the record may have been created with voice recognition software. Occasional wrong word or \"sound a like\" substitutions may have occurred due to the inherent limitations of voice recognition software. Read the chart carefully and recognize, using context, where substitutions have occurred.    Anuj Patino DO, [facility]  6/4/2024 5:37 PM          "

## 2024-06-05 ENCOUNTER — TELEPHONE (OUTPATIENT)
Dept: CARDIOLOGY CLINIC | Facility: CLINIC | Age: 54
End: 2024-06-05

## 2024-06-05 NOTE — TELEPHONE ENCOUNTER
LE[facility]IO [facility]     Scanned into chart.

## 2024-06-10 NOTE — TELEPHONE ENCOUNTER
Statement of benefits received from [facility].     Patient will be 100% covered once $300 deductible is met. Currently patient has met $153.40.   Patient was eligible for co-pay program and has been approved as of 6/7/2024. Patient should be receiving a form in the mail to get the copay card.     Prior auth is required.

## 2024-09-07 DIAGNOSIS — I25.10 CORONARY ARTERY DISEASE INVOLVING NATIVE CORONARY ARTERY OF NATIVE HEART WITHOUT ANGINA PECTORIS: ICD-10-CM

## 2024-09-07 DIAGNOSIS — I25.2 HISTORY OF NON-ST ELEVATION MYOCARDIAL INFARCTION (NSTEMI): ICD-10-CM

## 2024-09-07 DIAGNOSIS — E78.00 HYPERCHOLESTEROLEMIA: ICD-10-CM

## 2024-09-08 RX ORDER — ATORVASTATIN CALCIUM 80 MG/1
TABLET, FILM COATED ORAL
Qty: 90 TABLET | Refills: 1 | Status: SHIPPED | OUTPATIENT
Start: 2024-09-08

## 2024-10-27 DIAGNOSIS — I25.2 HISTORY OF NON-ST ELEVATION MYOCARDIAL INFARCTION (NSTEMI): ICD-10-CM

## 2024-10-27 DIAGNOSIS — R07.9 CHEST PAIN, UNSPECIFIED TYPE: ICD-10-CM

## 2024-10-29 RX ORDER — NITROGLYCERIN 0.4 MG/1
TABLET SUBLINGUAL
Qty: 25 TABLET | Refills: 1 | Status: SHIPPED | OUTPATIENT
Start: 2024-10-29

## 2024-12-04 ENCOUNTER — OFFICE VISIT (OUTPATIENT)
Dept: CARDIOLOGY CLINIC | Facility: CLINIC | Age: 54
End: 2024-12-04
Payer: COMMERCIAL

## 2024-12-04 VITALS
WEIGHT: 211.6 LBS | DIASTOLIC BLOOD PRESSURE: 80 MMHG | HEART RATE: 72 BPM | SYSTOLIC BLOOD PRESSURE: 130 MMHG | BODY MASS INDEX: 27.92 KG/M2

## 2024-12-04 DIAGNOSIS — E78.00 HYPERCHOLESTEROLEMIA: ICD-10-CM

## 2024-12-04 DIAGNOSIS — I25.2 HISTORY OF NON-ST ELEVATION MYOCARDIAL INFARCTION (NSTEMI): ICD-10-CM

## 2024-12-04 DIAGNOSIS — I20.89 STABLE ANGINA PECTORIS (HCC): ICD-10-CM

## 2024-12-04 DIAGNOSIS — I25.10 CORONARY ARTERY DISEASE INVOLVING NATIVE CORONARY ARTERY OF NATIVE HEART WITHOUT ANGINA PECTORIS: Primary | ICD-10-CM

## 2024-12-04 PROCEDURE — 99214 OFFICE O/P EST MOD 30 MIN: CPT

## 2024-12-04 RX ORDER — METOPROLOL SUCCINATE 50 MG/1
50 TABLET, EXTENDED RELEASE ORAL DAILY
Qty: 90 TABLET | Refills: 3 | Status: SHIPPED | OUTPATIENT
Start: 2024-12-04

## 2024-12-04 RX ORDER — METOPROLOL SUCCINATE 25 MG/1
25 TABLET, EXTENDED RELEASE ORAL DAILY
Qty: 90 TABLET | Refills: 3 | OUTPATIENT
Start: 2024-12-04

## 2024-12-04 NOTE — PROGRESS NOTES
Cardiology   MD Rell No MD, FACC  Jarod Green DO, [facility] Hospital[facility]RICE FACP, FASNC Ather Mansoor, MD Rujul Patel, DO, [facility]  Anuj Patino DO, [facility]RISTINA  -------------------------------------------------------------------  [facility]  [address] [place] [address]  Phone: [phone]  Fax: [phone]  12/04/24  Harjeet Campos  YOB: 1970   MRN: [id number]      Referring Physician: No referring provider defined for this encounter.     HPI: Harjeet Campos is a 53 y.o. male with:   Coronary artery disease status post PCI to the 1st obtuse marginal in June 2019  Dyslipidemia  Tobacco abuse  Mild valvular heart disease seen on echo in 2019  Moderate degenerative canal stenosis at levels C5-C6 and C6-C7 on MRI    He presents today for follow-up.  Last time I saw him he was doing well, try to get Leqvio covered but this fell through with insurance authorization, will refax paperwork regarding this.  Now he states he has been having left-sided chest pain with musculoskeletal sounding back pain, however with radiation down his left arm where he states he feels some pins-and-needles type feeling at times with the chest pain.  This is exertional when he is lifting things.  He has a history of coronary disease but also has a history of cervical spinal stenosis as well at the level of C5 and C6, complicating the picture    Review of Systems   Constitutional:  Negative for chills and fever.   HENT:  Negative for facial swelling and sore throat.    Eyes:  Negative for visual disturbance.   Respiratory:  Negative for cough, chest tightness, shortness of breath and wheezing.    Cardiovascular:  Positive for chest pain. Negative for palpitations and leg swelling.   Gastrointestinal:  Negative for abdominal pain, blood in stool, constipation, diarrhea, nausea and vomiting.   Endocrine: Negative for cold intolerance and heat intolerance.   Genitourinary:   Negative for decreased urine volume, difficulty urinating, dysuria and hematuria.   Musculoskeletal:  Negative for arthralgias, back pain and myalgias.   Skin:  Negative for rash.   Neurological:  Negative for dizziness, syncope, weakness and numbness.   Psychiatric/Behavioral:  Negative for agitation, behavioral problems and confusion. The patient is not nervous/anxious.         OBJECTIVE  Vitals:    12/04/24 1629   BP: 130/80   Pulse: 72       Physical Exam  Constitutional: awake, alert and oriented, in no acute distress, no obvious deformities  Head: Normocephalic, without obvious abnormality, atraumatic  Eyes: conjunctivae clear and moist. Sclera anicteric.  No xanthelasmas. Pupils equal bilaterally.  Extraocular motions are full.  Ear nose mouth and throat: ears are symmetrical bilaterally, hearing appears to be equal bilaterally, no nasal discharge or epistaxis, oropharynx is clear with moist mucous membranes  Neck:  Trachea is midline, neck is supple, no thyromegaly or significant lymphadenopathy, there is full range of motion.  Lungs: clear to auscultation bilaterally, no wheezes, no rales, no rhonchi, no accessory muscle use, breathing is nonlabored  Heart: Regular rhythm with a Normal heart rate, S1, S2 normal, No Murmur, no click, rub or gallop, No lower extremity edema  Abdomen: soft, non-tender; bowel sounds normal; no masses,  no organomegaly  Psychiatric:  Patient is oriented to time, place, person, mood/affect is negative for depression, anxiety, agitation, appears to have appropriate insight  Skin: Skin is warm, dry, intact. No obvious rashes or lesions on exposed extremities.  Nail beds are pink with no cyanosis or clubbing.    EKG:  No results found for this visit on 12/04/24.     IMPRESSION:  Chest pain, exertional, with radiation down left arm  Coronary artery disease status post PCI to the 1st obtuse marginal in June 2019  Dyslipidemia  Tobacco abuse  Mild valvular heart disease seen on echo in  2019  Moderate degenerative canal stenosis at levels C5-C6 and C6-C7 on MRI    DISCUSSION/RECOMMENDATIONS:  He presents today for follow-up.  He does have symptoms now of chest pain with radiation down his left arm.  Could be multifactorial, cannot rule out musculoskeletal as he does have canal stenosis in his cervical spine in this region which presumably could be causing the symptoms as well  However, with his history of CAD now with exertional chest pain with radiation to left arm, ischemic evaluation is warranted.  His nuclear stress test in 2022 was negative for ischemia.  Will check coronary CT angiogram now with these new symptoms  Continue with metoprolol but increase dose to 50 mg  We will refax his Leqvio paperwork, LDL is not to goal, continue with Lipitor 80 Zetia 10 mg aspirin 81 mg and start Leqvio once covered  If he has evidence of obstructive CAD on coronary CT angiogram will refer for cath    Anuj Patino DO, FACC, FASNC  --------------------------------------------------------------------------------  TREADMILL STRESS  No results found for this or any previous visit.     ----------------------------------------------------------------------------------------------  NUCLEAR STRESS TEST: Results for orders placed during the hospital encounter of 12/14/22    NM myocardial perfusion spect (stress and/or rest)    Interpretation Summary    Stress ECG: No ST deviation is noted. The ECG was negative for ischemia. The stress ECG is negative for ischemia after submaximal exercise, without reproduction of symptoms of chest pain.    Perfusion: There is a left ventricular perfusion defect that is medium in size with mild reduction in uptake present in the basal to mid inferior location(s) that is fixed with normal myocardial thickening. The defect appears to be an artifact caused by diaphragmatic activity.    Stress Function: Left ventricular function post-stress is normal. Post-stress ejection fraction is  58 %.    Stress Combined Conclusion: The ECG and SPECT imaging portions of the stress study are concordant with no evidence of stress induced myocardial ischemia. There is image artifact, without diagnostic evidence for perfusion abnormality.    No results found for this or any previous visit.      --------------------------------------------------------------------------------  CATH:  Results for orders placed during the hospital encounter of 06/10/19    Cardiac catheterization    Narrative  [address] [address] [phone]    ([place])    Invasive Cardiovascular Lab Complete Report    Patient: RENETTA SALMERON  MR number: [id number]  Account number: [id number]  Study date: 06/10/2019  Gender: Male  : 1970  Height: 72.8 in  Weight: 222.2 lb  BSA: 2.25 mï¾²    Allergies: NO KNOWN ALLERGIES    Diagnostic Cardiologist:  Bj Guajardo DO  Interventional Cardiologist:  Bj Guajardo DO  Primary Physician:  ELEANOR Ashraf    SUMMARY    CORONARY CIRCULATION:  There was 1-vessel coronary artery disease.  Left main: Normal.  1st obtuse marginal: There was a 90 % stenosis. Remainder circumflex nonobstructive    HEMODYNAMICS:  Hemodynamic assessment demonstrated mildly elevated LVEDP.    1ST LESION INTERVENTIONS:  A drug-eluting stent with balloon angioplasty procedure was performed on the 90 % lesion in the 1st obtuse marginal. Following intervention there was an excellent angiographic appearance with a 0 % residual stenosis.  A Xience Alba Rx 3.25 x 15mm drug-eluting stent was placed across the lesion and deployed at a maximum inflation pressure of 14 rich.    INDICATIONS:  --  Possible CAD: myocardial infarction without ST elevation (NSTEMI).    PROCEDURES PERFORMED    --  Left heart catheterization without ventriculogram.  --  Right coronary angiography.  --  Left coronary angiography.  --  Inpatient.  --  Mod Sedation Same  Physician Initial 15min.  --  Mod Sedation Same Physician Add 15min.  --  Coronary Catheterization (w/ [facility]).  --  Coronary Drug Eluting Stent w/PTCA.  --  Intervention on OM1: drug-eluting stent, balloon angioplasty.    PROCEDURE: The risks and alternatives of the procedures and conscious sedation were explained to the patient and informed consent was obtained. The patient was brought to the cath lab and placed on the table. The planned puncture sites  were prepped and draped in the usual sterile fashion.    --  Right radial artery access. After performing an Juliano's test to verify adequate ulnar artery supply to the hand, the radial site was prepped. The puncture site was infiltrated with local anesthetic. The vessel was accessed using the  modified Seldinger technique, a wire was advanced into the vessel, and a sheath was advanced over the wire into the vessel.    --  Left heart catheterization without ventriculogram. A catheter was advanced over a guidewire into the ascending aorta. After recording ascending aortic pressure, the catheter was advanced across the aortic valve and left ventricular  pressure was recorded. The catheter was pulled back across the aortic valve and into the ascending aorta and pullback pressures were obtained.    --  Right coronary artery angiography. A catheter was advanced over a guidewire into the aorta and positioned in the right coronary artery ostium under fluoroscopic guidance. Angiography was performed.    --  Left coronary artery angiography. A catheter was advanced over a guidewire into the aorta and positioned in the left coronary artery ostium under fluoroscopic guidance. Angiography was performed.    --  Inpatient.    --  Mod Sedation Same Physician Initial 15min.    --  Mod Sedation Same Physician Add 15min.    --  Coronary Catheterization (w/ [facility]).    LESION INTERVENTION: A drug-eluting stent with balloon angioplasty procedure was performed on the 90 % lesion in the 1st  "obtuse marginal. Following intervention there was an excellent angiographic appearance with a 0 % residual stenosis.  This was an ACC/AHA type B \"moderate risk\" lesion for intervention. There was SUSAN 3 flow before the procedure and SUSAN 3 flow after the procedure. There was no dissection.    --  Vessel setup was performed. A Launcher 6Fr Ebu 3.5 guiding catheter was used to cannulate the vessel.    --  Vessel setup was performed. A BMW .014 190cm wire was used to cross the lesion.    --  A Xience Alba Rx 3.25 x 15mm drug-eluting stent was placed across the lesion and deployed at a maximum inflation pressure of 14 rich.    --  Balloon angioplasty was performed, using a NC Trek Rx 3.5 x 15mm balloon, with 1 inflations and a maximum inflation pressure of 12 rich.    INTERVENTIONS:  --  Coronary Drug Eluting Stent w/PTCA.    PROCEDURE COMPLETION: The patient tolerated the procedure well and was discharged from the cath lab. TIMING: Test started at 10:11. Test concluded at 10:38. HEMOSTASIS: The sheath was removed. The site was compressed with a Hemoband  device. Hemostasis was obtained. MEDICATIONS GIVEN: Baby Aspirin, 324 mg, PO, at 10:12. Fentanyl (1OOmcg/2 ml), 50 mcg, IV, at 10:19. Versed (2mg/2ml), 2 mg, IV, at 10:19. 1% Lidocaine, 1 ml, subcutaneously, at 10:19. Verapamil (5mg/2ml),  2.5 mg, IV, at 10:19. Heparin 1000 units/ml, 4,000 units, IV, at 10:19. Nitroglycerin (200mcg/ml), 200 mcg, at 10:19. Heparin 1000 units/ml, 6,000 units, IV, at 10:27. CONTRAST GIVEN: 75 ml Omnipaque (350mg I /ml). RADIATION EXPOSURE:  Fluoroscopy time: 3.7 min.    HEMODYNAMICS: Hemodynamic assessment demonstrated mildly elevated LVEDP.    CORONARY VESSELS:   --  The coronary circulation is right dominant.  --  There was 1-vessel coronary artery disease.  --  Left main: Normal.  --  LAD: Angiography showed minor luminal irregularities.  --  1st obtuse marginal: There was a 90 % stenosis. Remainder circumflex nonobstructive  --  RCA: " Angiography showed minor luminal irregularities.    IMPRESSIONS:  PCI of OM with RORY    RECOMMENDATIONS  asa 81 indefinitely, plavix x 1 year    DISPOSITION:  The patient left the catheterization laboratory in stable condition.    Prepared and signed by    Bj Guajardo DO  Signed 06/10/2019 10:41:34    Study diagram    Angiographic findings  Native coronary lesions:  ï¾·OM1: Lesion 1: 90 % stenosis.  Intervention results  Native coronary lesions:  ï¾·drug-eluting stent and balloon angioplasty of the 90 % stenosis in OM1. Appearance excellent with 0 % residual stenosis. Stent: Xience Alba Rx 3.25 x 15mm drug-eluting.    Hemodynamic tables    Pressures:  Baseline  Pressures:  - HR: 70  Pressures:  - Rhythm:  Pressures:  -- Aortic Pressure (S/D/M): 119/35/95  Pressures:  -- Left Ventricle (s/edp): 106/19/--    Outputs:  Baseline  Outputs:  -- CALCULATIONS[age] 49.51  Outputs:  -- CALCULATIONS: Body Surface Area: 2.25  Outputs:  -- CALCULATIONS: Height in cm: 185.00  Outputs:  -- CALCULATIONS: Sex: Male  Outputs:  -- CALCULATIONS: Weight in k.00    --------------------------------------------------------------------------------  ECHO:   Results for orders placed during the hospital encounter of 06/10/19    Echo complete with contrast if indicated    [address] [id number] [phone]    Transthoracic Echocardiogram  2D, M-mode, Doppler, and Color Doppler    Study date:  2019    Patient: RENETTA SALMERON  MR number: [id number]  Account number: [id number]  : 1970  Age: 48 years  Gender: Male  Status: Inpatient  Location: Bedside  Height: 73 in  Weight: 223 lb  BP: 116/ 75 mmHg    Indications: NSTEMI    Diagnoses: I21.4 - Non-ST elevation (NSTEMI) myocardial infarction    Sonographer:  Mirian GALLEGO  Primary Physician:  ELEANOR Ashraf  Referring Physician:  Dae White MD  Group:  [facility]  Interpreting Physician:  Junior Clarke MD    SUMMARY    LEFT VENTRICLE:  Normal left ventricular systolic function, EF 58%. Normal left ventricular cavity size. Normal left ventricular wall thickness. Normal left ventricular wall motion without regional wall motion abnormalities. Normal left ventricular  diastolic function. Normal left atrial pressures and left ventricular filling pressures.    LEFT ATRIUM:  Mild left atrial enlargement.    MITRAL VALVE:  There was trace regurgitation.    TRICUSPID VALVE:  There was mild regurgitation.    HISTORY: PRIOR HISTORY: NSTEMI, tobacco use.    PROCEDURE: The procedure was performed at the bedside. This was a routine study. The transthoracic approach was used. The study included complete 2D imaging, M-mode, complete spectral Doppler, and color Doppler. The heart rate was 54 bpm,  at the start of the study. Images were obtained from the parasternal, apical, subcostal, and suprasternal notch acoustic windows. Echocardiographic views were limited due to high windows and lung interference. Image quality was adequate.    LEFT VENTRICLE: Normal left ventricular systolic function, EF 58%. Normal left ventricular cavity size. Normal left ventricular wall thickness. Normal left ventricular wall motion without regional wall motion abnormalities. Normal left  ventricular diastolic function. Normal left atrial pressures and left ventricular filling pressures.    RIGHT VENTRICLE: The size was normal. Systolic function was normal. Wall thickness was normal.    LEFT ATRIUM: Mild left atrial enlargement.    RIGHT ATRIUM: Size was normal.    MITRAL VALVE: Valve structure was normal. There was normal leaflet separation. DOPPLER: The transmitral velocity was within the normal range. There was no evidence for stenosis. There was trace regurgitation.    AORTIC VALVE: The valve was trileaflet. Leaflets exhibited normal thickness and normal cuspal separation. DOPPLER: Transaortic  velocity was within the normal range. There was no evidence for stenosis. There was no regurgitation.    TRICUSPID VALVE: The valve structure was normal. There was normal leaflet separation. DOPPLER: The transtricuspid velocity was within the normal range. There was no evidence for stenosis. There was mild regurgitation.    PULMONIC VALVE: DOPPLER: The transpulmonic velocity was within the normal range. There was no regurgitation.    PERICARDIUM: There was no pericardial effusion. The pericardium was normal in appearance.    AORTA: The root exhibited normal size.    PULMONARY ARTERY: The size was normal. DOPPLER: Systolic pressure was within the normal range.    SYSTEM MEASUREMENT TABLES    2D  %FS: 37.3 %  Ao Diam: 2.9 cm  EDV(Teich): 137.8 ml  EF(Teich): 66.8 %  ESV(Teich): 45.7 ml  IVSd: 0.9 cm  LA Area: 21.1 cm2  LA Diam: 4.1 cm  LVEDV MOD A4C: 135.2 ml  LVEF MOD A4C: 57.9 %  LVESV MOD A4C: 56.9 ml  LVIDd: 5.3 cm  LVIDs: 3.3 cm  LVLd A4C: 8.8 cm  LVLs A4C: 7.5 cm  LVPWd: 0.9 cm  RA Area: 14.9 cm2  RVIDd: 2.9 cm  SV MOD A4C: 78.3 ml  SV(Teich): 92.1 ml    CW  AV Vmax: 1.4 m/s  AV maxP.9 mmHg  TR Vmax: 2.2 m/s  TR maxP.9 mmHg    MM  TAPSE: 2 cm    PW  E': 0.1 m/s  E/E': 10.5  MV A [place]: 0.5 m/s  MV Dec [place]: 5.1 m/s2  MV DecT: 157.6 ms  MV E [place]: 0.8 m/s  MV E/A Ratio: 1.5  MV PHT: 45.7 ms  MVA By PHT: 4.8 cm2    [employer] Accredited Echocardiography Laboratory    Prepared and electronically signed by    Junior Clarke MD  Signed 2019 16:36:31    No results found for this or any previous visit.    --------------------------------------------------------------------------------  HOLTER  No results found for this or any previous visit.    No results found for this or any previous visit.    --------------------------------------------------------------------------------  CAROTIDS  No results found for this or any previous visit.      --------------------------------------------------------------------------------  Diagnoses and all orders for this visit:    Coronary artery disease involving native coronary artery of native heart without angina pectoris  -     CTA Cardiac w FFR if needed; Future  -     Basic metabolic panel; Future    History of non-ST elevation myocardial infarction (NSTEMI)  -     metoprolol succinate (TOPROL-XL) 50 mg 24 hr tablet; Take 1 tablet (50 mg total) by mouth daily    Hypercholesterolemia    Stable angina pectoris (HCC)  -     CTA Cardiac w FFR if needed; Future       ======================================================    Past Medical History:   Diagnosis Date    Hyperlipidemia     Hypertension     MI (myocardial infarction) ([facility])      Past Surgical History:   Procedure Laterality Date    CORONARY ANGIOPLASTY WITH STENT PLACEMENT      HAND SURGERY           Medications  Current Outpatient Medications   Medication Sig Dispense Refill    aspirin (Aspirin Low Dose) 81 mg chewable tablet Chew 1 tablet (81 mg total) daily 90 tablet 3    atorvastatin (LIPITOR) 80 mg tablet TOME JEREMÍAS TABLETA TODOS LOS ALVA EN LA NOCHE CON ALIMENTO 90 tablet 1    ezetimibe (ZETIA) 10 mg tablet Take 1 tablet (10 mg total) by mouth daily 90 tablet 3    metoprolol succinate (TOPROL-XL) 50 mg 24 hr tablet Take 1 tablet (50 mg total) by mouth daily 90 tablet 3    nitroglycerin (NITROSTAT) 0.4 mg SL tablet PLACE 1 TABLET UNDER THE TONGUE EVERY 5 MINUTES AS NEEDED FOR CHEST PAIN. 25 tablet 1    omeprazole (PriLOSEC) 20 mg delayed release capsule Take 1 capsule (20 mg total) by mouth daily 30 capsule 0    Blood Pressure Monitoring KIT Use 2 (two) times a day (Patient not taking: Reported on 12/4/2024) 1 kit 0    Diclofenac Sodium (VOLTAREN) 1 % Apply 2 g topically 4 (four) times a day (Patient not taking: Reported on 12/4/2024) 100 g 5    meclizine (ANTIVERT) 25 mg tablet Take 1 tablet (25 mg total) by mouth 3 (three) times a day as needed for  dizziness (Patient not taking: Reported on 12/4/2024) 90 tablet 3     Current Facility-Administered Medications   Medication Dose Route Frequency Provider Last Rate Last Admin    Inclisiran Sodium (LEQVIO) subcutaneous injection 284 mg  284 mg Subcutaneous Once             No Known Allergies    Social History     Socioeconomic History    Marital status: /Civil Union     Spouse name: Not on file    Number of children: Not on file    Years of education: Not on file    Highest education level: Not on file   Occupational History    Not on file   Tobacco Use    Smoking status: Every Day     Current packs/day: 1.00     Types: Cigarettes    Smokeless tobacco: Never   Vaping Use    Vaping status: Never Used   Substance and Sexual Activity    Alcohol use: Yes     Comment: occasional    Drug use: Not Currently    Sexual activity: Yes     Partners: Female   Other Topics Concern    Not on file   Social History Narrative    Not on file     Social Drivers of Health     Financial Resource Strain: Not on file   Food Insecurity: Not on file   Transportation Needs: Not on file   Physical Activity: Not on file   Stress: Not on file   Social Connections: Not on file   Intimate Partner Violence: Not on file   Housing Stability: Not on file        Family History   Problem Relation Age of Onset    No Known Problems Mother     No Known Problems Father        Lab Results   Component Value Date    WBC 8.95 04/24/2024    HGB 16.2 04/24/2024    HCT 49.8 (H) 04/24/2024    MCV 97 04/24/2024     04/24/2024      Lab Results   Component Value Date    SODIUM 136 04/24/2024    K 4.5 04/24/2024     04/24/2024    CO2 22 04/24/2024    BUN 24 04/24/2024    CREATININE 1.20 04/24/2024    GLUC 104 04/24/2024    CALCIUM 9.5 04/24/2024      Lab Results   Component Value Date    HGBA1C 5.8 (H) 03/03/2024      Lab Results   Component Value Date    CHOL 225 10/17/2014     Lab Results   Component Value Date    HDL 50 03/03/2024    HDL 39 (L)  "08/21/2023    HDL 40 02/08/2022     Lab Results   Component Value Date    LDLCALC 159 (H) 03/03/2024    LDLCALC 132 (H) 08/21/2023    LDLCALC 99 02/08/2022     Lab Results   Component Value Date    TRIG 106 03/03/2024    TRIG 171 (H) 08/21/2023    TRIG 124 02/08/2022     No results found for: \"CHOLHDL\"   Lab Results   Component Value Date    INR 1.05 01/24/2021    INR 1.06 04/12/2020    INR 1.03 06/09/2019    PROTIME 13.5 01/24/2021    PROTIME 13.8 04/12/2020    PROTIME 13.0 06/09/2019          Patient Active Problem List    Diagnosis Date Noted    Chronic bilateral thoracic back pain 02/08/2022    Dizziness 02/08/2022    Coronary artery disease involving native coronary artery of native heart without angina pectoris 02/08/2022    Chronic pain syndrome 07/23/2021    Thoracic disc herniation 07/23/2021    Thoracic radiculopathy 07/23/2021    Chest pain 01/24/2021    Prediabetes 06/17/2019    Hypercholesterolemia 06/11/2019    History of non-ST elevation myocardial infarction (NSTEMI) 06/10/2019    Tobacco abuse 06/10/2019    Blurry vision, bilateral 10/10/2014    Fatigue 10/10/2014    Overweight (BMI 25.0-29.9) 10/10/2014       Portions of the record may have been created with voice recognition software. Occasional wrong word or \"sound a like\" substitutions may have occurred due to the inherent limitations of voice recognition software. Read the chart carefully and recognize, using context, where substitutions have occurred.    Anuj Patino DO, FACC  12/4/2024 5:08 PM          "

## 2024-12-06 ENCOUNTER — TELEPHONE (OUTPATIENT)
Dept: CARDIOLOGY CLINIC | Facility: CLINIC | Age: 54
End: 2024-12-06

## 2024-12-06 NOTE — TELEPHONE ENCOUNTER
Phone call to patient.  Spoke with patient wife Stephany.  Patient would like to wait until January to initiate Leqvio injections.    Suggested to Stephany, Harjeet should come into office and we should reinitiate the process.  He should resign the Benefits request form and we should resubmit.    She will talk with him and let us know if he can come in to sign the form Dec 26 or 27 and call the office.

## 2024-12-30 DIAGNOSIS — I25.2 HISTORY OF NON-ST ELEVATION MYOCARDIAL INFARCTION (NSTEMI): ICD-10-CM

## 2024-12-30 DIAGNOSIS — R07.9 CHEST PAIN, UNSPECIFIED TYPE: ICD-10-CM

## 2024-12-30 NOTE — TELEPHONE ENCOUNTER
Office received notification from [facility] pharmacy stating that the patient is needing refill on nitroglycerin. The patient is a patient of Dr. Patino.

## 2024-12-31 RX ORDER — NITROGLYCERIN 0.4 MG/1
0.4 TABLET SUBLINGUAL
Qty: 25 TABLET | Refills: 1 | Status: SHIPPED | OUTPATIENT
Start: 2024-12-31

## 2025-02-12 ENCOUNTER — TELEPHONE (OUTPATIENT)
Dept: CARDIOLOGY CLINIC | Facility: CLINIC | Age: 55
End: 2025-02-12

## 2025-07-10 ENCOUNTER — TELEPHONE (OUTPATIENT)
Dept: FAMILY MEDICINE CLINIC | Facility: HOME HEALTHCARE | Age: 55
End: 2025-07-10